# Patient Record
Sex: MALE | Race: WHITE | NOT HISPANIC OR LATINO
[De-identification: names, ages, dates, MRNs, and addresses within clinical notes are randomized per-mention and may not be internally consistent; named-entity substitution may affect disease eponyms.]

---

## 2017-06-07 ENCOUNTER — RX RENEWAL (OUTPATIENT)
Age: 72
End: 2017-06-07

## 2017-08-01 ENCOUNTER — APPOINTMENT (OUTPATIENT)
Dept: INTERNAL MEDICINE | Facility: CLINIC | Age: 72
End: 2017-08-01
Payer: MEDICARE

## 2017-08-01 DIAGNOSIS — Z23 ENCOUNTER FOR IMMUNIZATION: ICD-10-CM

## 2017-08-01 PROCEDURE — 90471 IMMUNIZATION ADMIN: CPT

## 2017-08-01 PROCEDURE — 90715 TDAP VACCINE 7 YRS/> IM: CPT

## 2017-09-11 ENCOUNTER — APPOINTMENT (OUTPATIENT)
Dept: INTERNAL MEDICINE | Facility: CLINIC | Age: 72
End: 2017-09-11
Payer: MEDICARE

## 2017-09-11 PROCEDURE — G0008: CPT

## 2017-09-11 PROCEDURE — 90662 IIV NO PRSV INCREASED AG IM: CPT

## 2017-12-04 ENCOUNTER — RX RENEWAL (OUTPATIENT)
Age: 72
End: 2017-12-04

## 2017-12-13 ENCOUNTER — LABORATORY RESULT (OUTPATIENT)
Age: 72
End: 2017-12-13

## 2017-12-13 ENCOUNTER — APPOINTMENT (OUTPATIENT)
Dept: INTERNAL MEDICINE | Facility: CLINIC | Age: 72
End: 2017-12-13
Payer: MEDICARE

## 2017-12-13 ENCOUNTER — NON-APPOINTMENT (OUTPATIENT)
Age: 72
End: 2017-12-13

## 2017-12-13 VITALS
OXYGEN SATURATION: 90 % | BODY MASS INDEX: 21.9 KG/M2 | TEMPERATURE: 98.1 F | SYSTOLIC BLOOD PRESSURE: 130 MMHG | HEIGHT: 62 IN | HEART RATE: 79 BPM | WEIGHT: 119 LBS | DIASTOLIC BLOOD PRESSURE: 80 MMHG

## 2017-12-13 DIAGNOSIS — Z71.89 OTHER SPECIFIED COUNSELING: ICD-10-CM

## 2017-12-13 PROCEDURE — G0439: CPT

## 2017-12-13 PROCEDURE — 93000 ELECTROCARDIOGRAM COMPLETE: CPT

## 2017-12-13 PROCEDURE — 36415 COLL VENOUS BLD VENIPUNCTURE: CPT

## 2018-01-18 ENCOUNTER — APPOINTMENT (OUTPATIENT)
Dept: INTERNAL MEDICINE | Facility: CLINIC | Age: 73
End: 2018-01-18
Payer: MEDICARE

## 2018-01-18 LAB
ALBUMIN SERPL ELPH-MCNC: 4.3 G/DL
ALP BLD-CCNC: 61 U/L
ALT SERPL-CCNC: 8 U/L
ANION GAP SERPL CALC-SCNC: 10 MMOL/L
APPEARANCE: CLEAR
AST SERPL-CCNC: 18 U/L
BASOPHILS # BLD AUTO: 0.02 K/UL
BASOPHILS NFR BLD AUTO: 0.3 %
BILIRUB SERPL-MCNC: 0.7 MG/DL
BILIRUBIN URINE: NEGATIVE
BLOOD URINE: NEGATIVE
BUN SERPL-MCNC: 17 MG/DL
CALCIUM SERPL-MCNC: 10 MG/DL
CHLORIDE SERPL-SCNC: 100 MMOL/L
CHOLEST SERPL-MCNC: 192 MG/DL
CHOLEST/HDLC SERPL: 2.3 RATIO
CO2 SERPL-SCNC: 34 MMOL/L
COLOR: ABNORMAL
CREAT SERPL-MCNC: 1.1 MG/DL
EOSINOPHIL # BLD AUTO: 0.09 K/UL
EOSINOPHIL NFR BLD AUTO: 1.4
GLUCOSE QUALITATIVE U: NEGATIVE MG/DL
GLUCOSE SERPL-MCNC: 82 MG/DL
HBA1C MFR BLD HPLC: 5.7 %
HCT VFR BLD CALC: 50.3 %
HDLC SERPL-MCNC: 83 MG/DL
HGB BLD-MCNC: 16 G/DL
IMM GRANULOCYTES NFR BLD AUTO: 0.2 %
KETONES URINE: ABNORMAL
LDLC SERPL CALC-MCNC: 89 MG/DL
LEUKOCYTE ESTERASE URINE: NEGATIVE
LYMPHOCYTES # BLD AUTO: 0.78 K/UL
LYMPHOCYTES NFR BLD AUTO: 12.3 %
MAN DIFF?: NORMAL
MCHC RBC-ENTMCNC: 31.8 GM/DL
MCHC RBC-ENTMCNC: 34.4 PG
MCV RBC AUTO: 108.2 FL
MONOCYTES # BLD AUTO: 0.59 K/UL
MONOCYTES NFR BLD AUTO: 9.3 %
NEUTROPHILS # BLD AUTO: 4.85 K/UL
NEUTROPHILS NFR BLD AUTO: 76.5 %
NITRITE URINE: NEGATIVE
PH URINE: 5.5
PLATELET # BLD AUTO: 264 K/UL
POTASSIUM SERPL-SCNC: 4.9 MMOL/L
PROT SERPL-MCNC: 7.4 G/DL
PROTEIN URINE: NEGATIVE MG/DL
PSA SERPL-MCNC: 8.11 NG/ML
RBC # BLD: 4.65 M/UL
RBC # FLD: 14.6 %
SODIUM SERPL-SCNC: 144 MMOL/L
SPECIFIC GRAVITY URINE: 1.03
T4 FREE SERPL-MCNC: 1.4 NG/DL
TRIGL SERPL-MCNC: 99 MG/DL
TSH SERPL-ACNC: 0.63 UIU/ML
UROBILINOGEN URINE: 1 MG/DL
WBC # FLD AUTO: 6.34 K/UL

## 2018-01-18 PROCEDURE — 36415 COLL VENOUS BLD VENIPUNCTURE: CPT

## 2018-01-19 LAB — PSA SERPL-MCNC: 7.43 NG/ML

## 2018-02-02 ENCOUNTER — APPOINTMENT (OUTPATIENT)
Dept: CT IMAGING | Facility: CLINIC | Age: 73
End: 2018-02-02

## 2018-02-02 ENCOUNTER — OTHER (OUTPATIENT)
Age: 73
End: 2018-02-02

## 2018-02-08 ENCOUNTER — OUTPATIENT (OUTPATIENT)
Dept: OUTPATIENT SERVICES | Facility: HOSPITAL | Age: 73
LOS: 1 days | End: 2018-02-08
Payer: COMMERCIAL

## 2018-02-08 ENCOUNTER — APPOINTMENT (OUTPATIENT)
Dept: CT IMAGING | Facility: CLINIC | Age: 73
End: 2018-02-08

## 2018-02-08 DIAGNOSIS — Z98.89 OTHER SPECIFIED POSTPROCEDURAL STATES: Chronic | ICD-10-CM

## 2018-02-08 DIAGNOSIS — Z00.8 ENCOUNTER FOR OTHER GENERAL EXAMINATION: ICD-10-CM

## 2018-02-08 DIAGNOSIS — Z90.89 ACQUIRED ABSENCE OF OTHER ORGANS: Chronic | ICD-10-CM

## 2018-02-08 PROCEDURE — 74177 CT ABD & PELVIS W/CONTRAST: CPT

## 2018-02-08 PROCEDURE — 82565 ASSAY OF CREATININE: CPT

## 2018-02-08 PROCEDURE — 74177 CT ABD & PELVIS W/CONTRAST: CPT | Mod: 26

## 2018-07-31 ENCOUNTER — MEDICATION RENEWAL (OUTPATIENT)
Age: 73
End: 2018-07-31

## 2018-07-31 ENCOUNTER — APPOINTMENT (OUTPATIENT)
Dept: UROLOGY | Facility: CLINIC | Age: 73
End: 2018-07-31
Payer: MEDICARE

## 2018-07-31 VITALS
WEIGHT: 125 LBS | SYSTOLIC BLOOD PRESSURE: 177 MMHG | HEART RATE: 66 BPM | RESPIRATION RATE: 15 BRPM | DIASTOLIC BLOOD PRESSURE: 83 MMHG | TEMPERATURE: 98 F | HEIGHT: 62 IN | BODY MASS INDEX: 23 KG/M2

## 2018-07-31 DIAGNOSIS — R39.9 UNSPECIFIED SYMPTOMS AND SIGNS INVOLVING THE GENITOURINARY SYSTEM: ICD-10-CM

## 2018-07-31 PROCEDURE — 99214 OFFICE O/P EST MOD 30 MIN: CPT

## 2018-07-31 RX ORDER — CIPROFLOXACIN HYDROCHLORIDE 500 MG/1
500 TABLET, FILM COATED ORAL TWICE DAILY
Qty: 14 | Refills: 0 | Status: COMPLETED | COMMUNITY
Start: 2017-12-13 | End: 2018-07-31

## 2018-08-05 ENCOUNTER — OTHER (OUTPATIENT)
Age: 73
End: 2018-08-05

## 2018-08-05 LAB
APPEARANCE: CLEAR
BACTERIA UR CULT: NORMAL
BACTERIA: NEGATIVE
BILIRUBIN URINE: NEGATIVE
BLOOD URINE: NEGATIVE
COLOR: YELLOW
CORE LAB FLUID CYTOLOGY: NORMAL
GLUCOSE QUALITATIVE U: NEGATIVE MG/DL
HYALINE CASTS: 1 /LPF
KETONES URINE: NEGATIVE
LEUKOCYTE ESTERASE URINE: NEGATIVE
MICROSCOPIC-UA: NORMAL
NITRITE URINE: NEGATIVE
PH URINE: 6.5
PROTEIN URINE: NEGATIVE MG/DL
PSA FREE FLD-MCNC: 22.5
PSA FREE SERPL-MCNC: 1.7 NG/ML
PSA SERPL-MCNC: 7.55 NG/ML
RED BLOOD CELLS URINE: 0 /HPF
SPECIFIC GRAVITY URINE: 1.01
SQUAMOUS EPITHELIAL CELLS: 0 /HPF
TESTOST SERPL-MCNC: 501.8 NG/DL
UROBILINOGEN URINE: NEGATIVE MG/DL
WHITE BLOOD CELLS URINE: 0 /HPF

## 2018-08-16 ENCOUNTER — APPOINTMENT (OUTPATIENT)
Dept: UROLOGY | Facility: CLINIC | Age: 73
End: 2018-08-16
Payer: MEDICARE

## 2018-08-16 PROCEDURE — 99214 OFFICE O/P EST MOD 30 MIN: CPT

## 2018-08-17 ENCOUNTER — OTHER (OUTPATIENT)
Age: 73
End: 2018-08-17

## 2018-08-27 ENCOUNTER — APPOINTMENT (OUTPATIENT)
Dept: MRI IMAGING | Facility: IMAGING CENTER | Age: 73
End: 2018-08-27

## 2018-09-02 LAB
PSA FREE FLD-MCNC: 16.1
PSA FREE SERPL-MCNC: 1.9 NG/ML
PSA SERPL-MCNC: 11.77 NG/ML

## 2018-09-05 ENCOUNTER — LABORATORY RESULT (OUTPATIENT)
Age: 73
End: 2018-09-05

## 2018-09-05 ENCOUNTER — APPOINTMENT (OUTPATIENT)
Dept: UROLOGY | Facility: CLINIC | Age: 73
End: 2018-09-05
Payer: MEDICARE

## 2018-09-05 PROCEDURE — 99214 OFFICE O/P EST MOD 30 MIN: CPT

## 2018-09-08 LAB
ALBUMIN SERPL ELPH-MCNC: 4.3 G/DL
ALP BLD-CCNC: 64 U/L
ALT SERPL-CCNC: 9 U/L
ANION GAP SERPL CALC-SCNC: 10 MMOL/L
APPEARANCE: CLEAR
AST SERPL-CCNC: 16 U/L
BACTERIA UR CULT: NORMAL
BACTERIA: NEGATIVE
BILIRUB SERPL-MCNC: 0.4 MG/DL
BILIRUBIN URINE: NEGATIVE
BLOOD URINE: NEGATIVE
BUN SERPL-MCNC: 21 MG/DL
CALCIUM SERPL-MCNC: 9.1 MG/DL
CHLORIDE SERPL-SCNC: 103 MMOL/L
CO2 SERPL-SCNC: 28 MMOL/L
COLOR: YELLOW
CORE LAB FLUID CYTOLOGY: NORMAL
CREAT SERPL-MCNC: 1.15 MG/DL
GLUCOSE QUALITATIVE U: NEGATIVE MG/DL
GLUCOSE SERPL-MCNC: 111 MG/DL
HYALINE CASTS: 1 /LPF
KETONES URINE: ABNORMAL
LEUKOCYTE ESTERASE URINE: NEGATIVE
MICROSCOPIC-UA: NORMAL
NITRITE URINE: NEGATIVE
PH URINE: 5.5
POTASSIUM SERPL-SCNC: 5.2 MMOL/L
PROT SERPL-MCNC: 6.4 G/DL
PROTEIN URINE: NEGATIVE MG/DL
PSA FREE FLD-MCNC: 24.1
PSA FREE SERPL-MCNC: 1.74 NG/ML
PSA SERPL-MCNC: 7.21 NG/ML
RED BLOOD CELLS URINE: 2 /HPF
SODIUM SERPL-SCNC: 141 MMOL/L
SPECIFIC GRAVITY URINE: 1.03
SQUAMOUS EPITHELIAL CELLS: 0 /HPF
TESTOST SERPL-MCNC: 292.4 NG/DL
UROBILINOGEN URINE: NEGATIVE MG/DL
WHITE BLOOD CELLS URINE: 1 /HPF

## 2018-09-11 LAB
BASOPHILS # BLD AUTO: 0.02 K/UL
BASOPHILS NFR BLD AUTO: 0.3 %
EOSINOPHIL # BLD AUTO: 0.07 K/UL
EOSINOPHIL NFR BLD AUTO: 1.2 %
HCT VFR BLD CALC: 43.2 %
HGB BLD-MCNC: 13.7 G/DL
IMM GRANULOCYTES NFR BLD AUTO: 0.2 %
LYMPHOCYTES # BLD AUTO: 0.96 K/UL
LYMPHOCYTES NFR BLD AUTO: 16.3 %
MAN DIFF?: NORMAL
MCHC RBC-ENTMCNC: 31.7 GM/DL
MCHC RBC-ENTMCNC: 33.9 PG
MCV RBC AUTO: 106.9 FL
MONOCYTES # BLD AUTO: 0.5 K/UL
MONOCYTES NFR BLD AUTO: 8.5 %
NEUTROPHILS # BLD AUTO: 4.33 K/UL
NEUTROPHILS NFR BLD AUTO: 73.5 %
PLATELET # BLD AUTO: 311 K/UL
RBC # BLD: 4.04 M/UL
RBC # FLD: 14.6 %
WBC # FLD AUTO: 5.89 K/UL

## 2018-09-14 ENCOUNTER — APPOINTMENT (OUTPATIENT)
Dept: DERMATOLOGY | Facility: CLINIC | Age: 73
End: 2018-09-14
Payer: MEDICARE

## 2018-09-14 VITALS
SYSTOLIC BLOOD PRESSURE: 130 MMHG | WEIGHT: 125 LBS | DIASTOLIC BLOOD PRESSURE: 68 MMHG | BODY MASS INDEX: 23 KG/M2 | HEIGHT: 62 IN

## 2018-09-14 DIAGNOSIS — Z86.79 PERSONAL HISTORY OF OTHER DISEASES OF THE CIRCULATORY SYSTEM: ICD-10-CM

## 2018-09-14 DIAGNOSIS — Z91.89 OTHER SPECIFIED PERSONAL RISK FACTORS, NOT ELSEWHERE CLASSIFIED: ICD-10-CM

## 2018-09-14 PROCEDURE — 17000 DESTRUCT PREMALG LESION: CPT

## 2018-09-14 PROCEDURE — 99203 OFFICE O/P NEW LOW 30 MIN: CPT

## 2018-10-24 ENCOUNTER — APPOINTMENT (OUTPATIENT)
Dept: UROLOGY | Facility: CLINIC | Age: 73
End: 2018-10-24

## 2018-11-12 ENCOUNTER — RX RENEWAL (OUTPATIENT)
Age: 73
End: 2018-11-12

## 2018-11-14 LAB
ALBUMIN SERPL ELPH-MCNC: 4.4 G/DL
ALP BLD-CCNC: 69 U/L
ALT SERPL-CCNC: 9 U/L
ANION GAP SERPL CALC-SCNC: 12 MMOL/L
APPEARANCE: CLEAR
AST SERPL-CCNC: 13 U/L
BACTERIA: NEGATIVE
BASOPHILS # BLD AUTO: 0.02 K/UL
BASOPHILS NFR BLD AUTO: 0.3 %
BILIRUB SERPL-MCNC: 0.4 MG/DL
BILIRUBIN URINE: NEGATIVE
BLOOD URINE: NEGATIVE
BUN SERPL-MCNC: 16 MG/DL
CALCIUM SERPL-MCNC: 9.2 MG/DL
CHLORIDE SERPL-SCNC: 97 MMOL/L
CO2 SERPL-SCNC: 29 MMOL/L
COLOR: YELLOW
CORE LAB FLUID CYTOLOGY: NORMAL
CREAT SERPL-MCNC: 0.73 MG/DL
EOSINOPHIL # BLD AUTO: 0.03 K/UL
EOSINOPHIL NFR BLD AUTO: 0.5 %
GLUCOSE QUALITATIVE U: NEGATIVE MG/DL
GLUCOSE SERPL-MCNC: 109 MG/DL
HCT VFR BLD CALC: 46.3 %
HGB BLD-MCNC: 14.5 G/DL
HYALINE CASTS: 1 /LPF
IMM GRANULOCYTES NFR BLD AUTO: 0.2 %
KETONES URINE: ABNORMAL
LEUKOCYTE ESTERASE URINE: NEGATIVE
LYMPHOCYTES # BLD AUTO: 0.75 K/UL
LYMPHOCYTES NFR BLD AUTO: 13 %
MAN DIFF?: NORMAL
MCHC RBC-ENTMCNC: 31.3 GM/DL
MCHC RBC-ENTMCNC: 31.8 PG
MCV RBC AUTO: 101.5 FL
MICROSCOPIC-UA: NORMAL
MONOCYTES # BLD AUTO: 0.63 K/UL
MONOCYTES NFR BLD AUTO: 10.9 %
NEUTROPHILS # BLD AUTO: 4.35 K/UL
NEUTROPHILS NFR BLD AUTO: 75.1 %
NITRITE URINE: NEGATIVE
PH URINE: 6
PLATELET # BLD AUTO: 312 K/UL
POTASSIUM SERPL-SCNC: 4.9 MMOL/L
PROT SERPL-MCNC: 6.8 G/DL
PROTEIN URINE: NEGATIVE MG/DL
PSA FREE FLD-MCNC: 22.3
PSA FREE SERPL-MCNC: 2.12 NG/ML
PSA SERPL-MCNC: 9.5 NG/ML
RBC # BLD: 4.56 M/UL
RBC # FLD: 14.7 %
RED BLOOD CELLS URINE: 2 /HPF
SODIUM SERPL-SCNC: 138 MMOL/L
SPECIFIC GRAVITY URINE: 1.02
SQUAMOUS EPITHELIAL CELLS: 0 /HPF
UROBILINOGEN URINE: 1 MG/DL
WBC # FLD AUTO: 5.79 K/UL
WHITE BLOOD CELLS URINE: 2 /HPF

## 2018-11-18 LAB
TESTOST BND SERPL-MCNC: 2.9 PG/ML
TESTOST SERPL-MCNC: 473.9 NG/DL

## 2018-11-21 ENCOUNTER — APPOINTMENT (OUTPATIENT)
Dept: UROLOGY | Facility: CLINIC | Age: 73
End: 2018-11-21
Payer: MEDICARE

## 2018-11-21 DIAGNOSIS — D53.1 OTHER MEGALOBLASTIC ANEMIAS, NOT ELSEWHERE CLASSIFIED: ICD-10-CM

## 2018-11-21 PROCEDURE — 99214 OFFICE O/P EST MOD 30 MIN: CPT

## 2018-11-25 LAB
APPEARANCE: CLEAR
BACTERIA UR CULT: NORMAL
BACTERIA: NEGATIVE
BILIRUBIN URINE: NEGATIVE
BLOOD URINE: NEGATIVE
COLOR: YELLOW
CORE LAB FLUID CYTOLOGY: NORMAL
GLUCOSE QUALITATIVE U: NEGATIVE MG/DL
KETONES URINE: ABNORMAL
LEUKOCYTE ESTERASE URINE: NEGATIVE
MICROSCOPIC-UA: NORMAL
NITRITE URINE: NEGATIVE
PH URINE: 5.5
PROTEIN URINE: NEGATIVE MG/DL
RED BLOOD CELLS URINE: 1 /HPF
SPECIFIC GRAVITY URINE: 1.02
SQUAMOUS EPITHELIAL CELLS: 0 /HPF
UROBILINOGEN URINE: NEGATIVE MG/DL
WHITE BLOOD CELLS URINE: 0 /HPF

## 2018-12-10 ENCOUNTER — APPOINTMENT (OUTPATIENT)
Dept: INTERNAL MEDICINE | Facility: CLINIC | Age: 73
End: 2018-12-10
Payer: MEDICARE

## 2018-12-10 ENCOUNTER — OTHER (OUTPATIENT)
Age: 73
End: 2018-12-10

## 2018-12-10 ENCOUNTER — NON-APPOINTMENT (OUTPATIENT)
Age: 73
End: 2018-12-10

## 2018-12-10 VITALS
OXYGEN SATURATION: 93 % | SYSTOLIC BLOOD PRESSURE: 130 MMHG | BODY MASS INDEX: 22.43 KG/M2 | HEART RATE: 73 BPM | HEIGHT: 62.5 IN | TEMPERATURE: 97.7 F | DIASTOLIC BLOOD PRESSURE: 80 MMHG | WEIGHT: 125 LBS

## 2018-12-10 DIAGNOSIS — Z11.59 ENCOUNTER FOR SCREENING FOR OTHER VIRAL DISEASES: ICD-10-CM

## 2018-12-10 PROCEDURE — G0444 DEPRESSION SCREEN ANNUAL: CPT

## 2018-12-10 PROCEDURE — 99213 OFFICE O/P EST LOW 20 MIN: CPT | Mod: 25

## 2018-12-10 PROCEDURE — 99406 BEHAV CHNG SMOKING 3-10 MIN: CPT

## 2018-12-10 PROCEDURE — 36415 COLL VENOUS BLD VENIPUNCTURE: CPT

## 2018-12-10 PROCEDURE — 93000 ELECTROCARDIOGRAM COMPLETE: CPT | Mod: 59

## 2018-12-10 PROCEDURE — G0439: CPT

## 2018-12-10 RX ORDER — SULFAMETHOXAZOLE AND TRIMETHOPRIM 800; 160 MG/1; MG/1
800-160 TABLET ORAL
Qty: 28 | Refills: 0 | Status: DISCONTINUED | COMMUNITY
Start: 2018-07-31 | End: 2018-12-10

## 2018-12-10 NOTE — PHYSICAL EXAM
[No Acute Distress] : no acute distress [Well Nourished] : well nourished [Well Developed] : well developed [Well-Appearing] : well-appearing [Normal Voice/Communication] : normal voice/communication [Normal Sclera/Conjunctiva] : normal sclera/conjunctiva [PERRL] : pupils equal round and reactive to light [Normal Outer Ear/Nose] : the outer ears and nose were normal in appearance [Normal Oropharynx] : the oropharynx was normal [Normal TMs] : both tympanic membranes were normal [No JVD] : no jugular venous distention [Supple] : supple [No Lymphadenopathy] : no lymphadenopathy [Thyroid Normal, No Nodules] : the thyroid was normal and there were no nodules present [No Respiratory Distress] : no respiratory distress  [Clear to Auscultation] : lungs were clear to auscultation bilaterally [No Accessory Muscle Use] : no accessory muscle use [Normal Rate] : normal rate  [Regular Rhythm] : with a regular rhythm [Normal S1, S2] : normal S1 and S2 [No Murmur] : no murmur heard [No Carotid Bruits] : no carotid bruits [No Abdominal Bruit] : a ~M bruit was not heard ~T in the abdomen [No Varicosities] : no varicosities [Pedal Pulses Present] : the pedal pulses are present [No Edema] : there was no peripheral edema [No Extremity Clubbing/Cyanosis] : no extremity clubbing/cyanosis [Soft] : abdomen soft [Non Tender] : non-tender [Non-distended] : non-distended [No Masses] : no abdominal mass palpated [No HSM] : no HSM [Normal Bowel Sounds] : normal bowel sounds [Declined Rectal Exam] : declined rectal exam [Normal Supraclavicular Nodes] : no supraclavicular lymphadenopathy [Normal Posterior Cervical Nodes] : no posterior cervical lymphadenopathy [Normal Anterior Cervical Nodes] : no anterior cervical lymphadenopathy [No CVA Tenderness] : no CVA  tenderness [No Spinal Tenderness] : no spinal tenderness [No Joint Swelling] : no joint swelling [Grossly Normal Strength/Tone] : grossly normal strength/tone [No Rash] : no rash [Normal Gait] : normal gait [Coordination Grossly Intact] : coordination grossly intact [No Focal Deficits] : no focal deficits [Speech Grossly Normal] : speech grossly normal [Normal Affect] : the affect was normal [Normal Mood] : the mood was normal [Normal Insight/Judgement] : insight and judgment were intact [de-identified] : thin stature  [FreeTextEntry1] : done w urology recently

## 2018-12-10 NOTE — HISTORY OF PRESENT ILLNESS
[FreeTextEntry1] : \par 74 y/o man presents for annual physical exam. he feels well currently overall w no new concerns. \par \par Crohns disease stable on mesalamine, following w Dr Timmy Vidales GI, his weight has stabilized after dropping somewhat last year. he has no problems with bowel movements or abd pain. \par \par chronic tobacco use unable to cut down significantly or quit \par BPH on rx w elevated PSA, monitoring w urology regularly, MRI prostate did not reveal any lesions \par GERD well controlled on PPI \par ED symptoms unchanged\par pulmonary nodule LLL 6 mm unchanged on most recent CT chest 2016\par \par last colonoscopy 5/15 w Dr Vidales

## 2018-12-10 NOTE — COUNSELING
[Activity counseling provided] : activity [Smoking cessation counseling provided] : smoking cessation [Quit Smoking] : Quit smoking [ - Annual Depression Screening] : Annual Depression Screening

## 2018-12-10 NOTE — ASSESSMENT
[FreeTextEntry1] : discussed w pt \par \par check routine labs as below \par \par cont current rx \par \par smoking cessation advised and discussed in detail. he has been prescribed Chantix in the past and is aware of options, he is not ready to try to quit smoking currently. however he is moving to a new residential community soon and realizes he may not be able to smoke on premises. \par \par vaccines UTD , had influenza vaccine at the pharm \par \par GI f/u w Dr Vidales as scheduled, last colonoscopy 2015\par \par repeat CT chest imaging of LLL nodule , ordered \par \par cont urology f/u, PSA monitoring as scheduled, MRI prostate normal \par \par RTO 6 months for routine f/u or earlier prn if any new concerns

## 2018-12-10 NOTE — HEALTH RISK ASSESSMENT
[No falls in past year] : Patient reported no falls in the past year [0] : 2) Feeling down, depressed, or hopeless: Not at all (0) [Patient reported colonoscopy was normal] : Patient reported colonoscopy was normal [Hepatitis C test offered] : Hepatitis C test offered [None] : None [With Significant Other] : lives with significant other [Retired] : retired [] :  [Fully functional (bathing, dressing, toileting, transferring, walking, feeding)] : Fully functional (bathing, dressing, toileting, transferring, walking, feeding) [Fully functional (using the telephone, shopping, preparing meals, housekeeping, doing laundry, using] : Fully functional and needs no help or supervision to perform IADLs (using the telephone, shopping, preparing meals, housekeeping, doing laundry, using transportation, managing medications and managing finances) [] : No [ColonoscopyDate] : 05/15

## 2018-12-12 LAB
ALBUMIN SERPL ELPH-MCNC: 4.2 G/DL
ALP BLD-CCNC: 68 U/L
ALT SERPL-CCNC: 8 U/L
ANION GAP SERPL CALC-SCNC: 13 MMOL/L
AST SERPL-CCNC: 20 U/L
BASOPHILS # BLD AUTO: 0.02 K/UL
BASOPHILS NFR BLD AUTO: 0.4 %
BILIRUB SERPL-MCNC: 0.4 MG/DL
BUN SERPL-MCNC: 18 MG/DL
CALCIUM SERPL-MCNC: 9.5 MG/DL
CHLORIDE SERPL-SCNC: 99 MMOL/L
CHOLEST SERPL-MCNC: 201 MG/DL
CHOLEST/HDLC SERPL: 2.4 RATIO
CO2 SERPL-SCNC: 28 MMOL/L
CREAT SERPL-MCNC: 0.83 MG/DL
EOSINOPHIL # BLD AUTO: 0.06 K/UL
EOSINOPHIL NFR BLD AUTO: 1.2 %
FOLATE SERPL-MCNC: 10 NG/ML
GLUCOSE SERPL-MCNC: 59 MG/DL
HBA1C MFR BLD HPLC: 5.9 %
HCT VFR BLD CALC: 49.4 %
HCV AB SER QL: NONREACTIVE
HCV S/CO RATIO: 0.2 S/CO
HDLC SERPL-MCNC: 83 MG/DL
HGB BLD-MCNC: 15.1 G/DL
IMM GRANULOCYTES NFR BLD AUTO: 0.2 %
LDLC SERPL CALC-MCNC: 106 MG/DL
LYMPHOCYTES # BLD AUTO: 0.79 K/UL
LYMPHOCYTES NFR BLD AUTO: 16.1 %
MAN DIFF?: NORMAL
MCHC RBC-ENTMCNC: 30.6 GM/DL
MCHC RBC-ENTMCNC: 31.3 PG
MCV RBC AUTO: 102.5 FL
MONOCYTES # BLD AUTO: 0.46 K/UL
MONOCYTES NFR BLD AUTO: 9.4 %
NEUTROPHILS # BLD AUTO: 3.56 K/UL
NEUTROPHILS NFR BLD AUTO: 72.7 %
PLATELET # BLD AUTO: 271 K/UL
POTASSIUM SERPL-SCNC: 4.5 MMOL/L
PROT SERPL-MCNC: 7.6 G/DL
RBC # BLD: 4.82 M/UL
RBC # FLD: 15.7 %
SODIUM SERPL-SCNC: 140 MMOL/L
T4 FREE SERPL-MCNC: 1.2 NG/DL
TRIGL SERPL-MCNC: 58 MG/DL
TSH SERPL-ACNC: 1.57 UIU/ML
VIT B12 SERPL-MCNC: 398 PG/ML
WBC # FLD AUTO: 4.9 K/UL

## 2018-12-16 ENCOUNTER — FORM ENCOUNTER (OUTPATIENT)
Age: 73
End: 2018-12-16

## 2018-12-17 ENCOUNTER — OUTPATIENT (OUTPATIENT)
Dept: OUTPATIENT SERVICES | Facility: HOSPITAL | Age: 73
LOS: 1 days | End: 2018-12-17
Payer: COMMERCIAL

## 2018-12-17 ENCOUNTER — APPOINTMENT (OUTPATIENT)
Dept: CT IMAGING | Facility: CLINIC | Age: 73
End: 2018-12-17

## 2018-12-17 DIAGNOSIS — Z98.89 OTHER SPECIFIED POSTPROCEDURAL STATES: Chronic | ICD-10-CM

## 2018-12-17 DIAGNOSIS — Z00.8 ENCOUNTER FOR OTHER GENERAL EXAMINATION: ICD-10-CM

## 2018-12-17 DIAGNOSIS — Z90.89 ACQUIRED ABSENCE OF OTHER ORGANS: Chronic | ICD-10-CM

## 2018-12-17 PROCEDURE — 71250 CT THORAX DX C-: CPT

## 2018-12-17 PROCEDURE — 71250 CT THORAX DX C-: CPT | Mod: 26

## 2018-12-18 DIAGNOSIS — R91.8 OTHER NONSPECIFIC ABNORMAL FINDING OF LUNG FIELD: ICD-10-CM

## 2019-01-16 ENCOUNTER — APPOINTMENT (OUTPATIENT)
Dept: CARDIOLOGY | Facility: CLINIC | Age: 74
End: 2019-01-16
Payer: MEDICARE

## 2019-01-16 ENCOUNTER — NON-APPOINTMENT (OUTPATIENT)
Age: 74
End: 2019-01-16

## 2019-01-16 VITALS
HEART RATE: 81 BPM | BODY MASS INDEX: 21.17 KG/M2 | DIASTOLIC BLOOD PRESSURE: 83 MMHG | OXYGEN SATURATION: 93 % | WEIGHT: 118 LBS | HEIGHT: 62.5 IN | SYSTOLIC BLOOD PRESSURE: 174 MMHG

## 2019-01-16 VITALS — SYSTOLIC BLOOD PRESSURE: 130 MMHG | DIASTOLIC BLOOD PRESSURE: 70 MMHG

## 2019-01-16 DIAGNOSIS — Z01.810 ENCOUNTER FOR PREPROCEDURAL CARDIOVASCULAR EXAMINATION: ICD-10-CM

## 2019-01-16 PROCEDURE — 93000 ELECTROCARDIOGRAM COMPLETE: CPT

## 2019-01-16 PROCEDURE — 99204 OFFICE O/P NEW MOD 45 MIN: CPT

## 2019-01-17 PROBLEM — Z01.810 PREOPERATIVE CARDIOVASCULAR EXAMINATION: Status: ACTIVE | Noted: 2019-01-17

## 2019-02-05 ENCOUNTER — MEDICATION RENEWAL (OUTPATIENT)
Age: 74
End: 2019-02-05

## 2019-02-19 ENCOUNTER — APPOINTMENT (OUTPATIENT)
Dept: INTERNAL MEDICINE | Facility: CLINIC | Age: 74
End: 2019-02-19

## 2019-02-28 ENCOUNTER — APPOINTMENT (OUTPATIENT)
Dept: INTERNAL MEDICINE | Facility: CLINIC | Age: 74
End: 2019-02-28
Payer: MEDICARE

## 2019-02-28 VITALS
HEART RATE: 60 BPM | BODY MASS INDEX: 21.62 KG/M2 | DIASTOLIC BLOOD PRESSURE: 60 MMHG | SYSTOLIC BLOOD PRESSURE: 140 MMHG | HEIGHT: 63 IN | WEIGHT: 122 LBS | TEMPERATURE: 98.3 F | OXYGEN SATURATION: 94 %

## 2019-02-28 PROCEDURE — 99214 OFFICE O/P EST MOD 30 MIN: CPT | Mod: 25

## 2019-02-28 PROCEDURE — 99406 BEHAV CHNG SMOKING 3-10 MIN: CPT

## 2019-02-28 NOTE — REVIEW OF SYSTEMS
[Negative] : Heme/Lymph [Fever] : no fever [Chills] : no chills [Recent Change In Weight] : ~T no recent weight change [Chest Pain] : no chest pain [Lower Ext Edema] : no lower extremity edema [Orthopena] : no orthopnea [Paroysmal Nocturnal Dyspnea] : no paroysmal nocturnal dyspnea [Shortness Of Breath] : no shortness of breath [Wheezing] : no wheezing [Cough] : no cough [Dyspnea on Exertion] : not dyspnea on exertion [Abdominal Pain] : no abdominal pain [Vomiting] : no vomiting [Dysuria] : no dysuria [Back Pain] : no back pain

## 2019-02-28 NOTE — PHYSICAL EXAM
[No Acute Distress] : no acute distress [Well-Appearing] : well-appearing [Normal Voice/Communication] : normal voice/communication [No JVD] : no jugular venous distention [Supple] : supple [No Respiratory Distress] : no respiratory distress  [Clear to Auscultation] : lungs were clear to auscultation bilaterally [No Accessory Muscle Use] : no accessory muscle use [Normal Rate] : normal rate  [Regular Rhythm] : with a regular rhythm [Normal S1, S2] : normal S1 and S2 [No Murmur] : no murmur heard [No Carotid Bruits] : no carotid bruits [No Edema] : there was no peripheral edema [Normal Supraclavicular Nodes] : no supraclavicular lymphadenopathy [Normal Posterior Cervical Nodes] : no posterior cervical lymphadenopathy [Normal Anterior Cervical Nodes] : no anterior cervical lymphadenopathy [Grossly Normal Strength/Tone] : grossly normal strength/tone [Normal Gait] : normal gait [Coordination Grossly Intact] : coordination grossly intact [Normal Mood] : the mood was normal [Normal Insight/Judgement] : insight and judgment were intact [de-identified] : mildly reduced BS  b/l  [de-identified] : L later chest and back w VATS incisional scars, well healed

## 2019-02-28 NOTE — ASSESSMENT
[FreeTextEntry1] : discussed w pt \par reviewed CT, PET imaging results and biopsy report. 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 \par he is doing very well s/p wedge resection of LLL adenocarcinoma. all lymph nodes neg.\par cont pulm rehab. \par he has stopping smoking since his surgery. he is maintaining his abstinence from smoking and discussed strategies for long term complete smoking cessation and importance of this. \par discussed COPD and again the importance of smoking cessation, some exercise as well. \par f/u w pulmonologist as scheduled. \par cont Anoro ellipta. \par \par RTO 2 months for f/u or earlier prn if any new concerns

## 2019-02-28 NOTE — COUNSELING
[Discussed Risks and Advised to Quit Smoking] : Discussed risks and advised to quit smoking [Discussed Cessation Strategies] : cessation strategies were discussed [Tobacco Use Cessation Intermediate Greater Than 3 Minutes Up to 10 Minutes] : Tobacco Use Cessation Intermediate Greater Than 3 Minutes Up to 10 Minutes

## 2019-02-28 NOTE — HISTORY OF PRESENT ILLNESS
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 he is feeling well currently, has no concerns. surgery went well , no complications. there was some concern re his PFTs which showed significant preop emphysema, but pt has remained relatively asymptomatic and tolerated surgery very well. \par he has started pulm rehab as recommended at [facility]. following w a pulmonologist and was started on Anoro ellipta. \par pathology showed adenocarcinoma, all lymph nodes neg. no further treatment was advised after lung wedge resection. he has stopped smoking since the diagnosis and is doing well. 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 \par \par HTN controlled on rx

## 2019-03-20 ENCOUNTER — APPOINTMENT (OUTPATIENT)
Dept: PULMONOLOGY | Facility: CLINIC | Age: 74
End: 2019-03-20

## 2019-04-11 LAB
PSA FREE FLD-MCNC: 23 %
PSA FREE SERPL-MCNC: 1.85 NG/ML
PSA SERPL-MCNC: 8.22 NG/ML

## 2019-04-15 ENCOUNTER — APPOINTMENT (OUTPATIENT)
Dept: PULMONOLOGY | Facility: CLINIC | Age: 74
End: 2019-04-15
Payer: MEDICARE

## 2019-04-15 ENCOUNTER — APPOINTMENT (OUTPATIENT)
Dept: UROLOGY | Facility: CLINIC | Age: 74
End: 2019-04-15
Payer: MEDICARE

## 2019-04-15 VITALS
HEIGHT: 64 IN | DIASTOLIC BLOOD PRESSURE: 85 MMHG | BODY MASS INDEX: 21.51 KG/M2 | OXYGEN SATURATION: 95 % | WEIGHT: 126 LBS | RESPIRATION RATE: 16 BRPM | SYSTOLIC BLOOD PRESSURE: 171 MMHG | HEART RATE: 60 BPM

## 2019-04-15 LAB
APPEARANCE: CLEAR
BACTERIA: NEGATIVE
BILIRUBIN URINE: NEGATIVE
BLOOD URINE: NEGATIVE
COLOR: NORMAL
GLUCOSE QUALITATIVE U: NEGATIVE
HYALINE CASTS: 1 /LPF
KETONES URINE: NEGATIVE
LEUKOCYTE ESTERASE URINE: NEGATIVE
MICROSCOPIC-UA: NORMAL
NITRITE URINE: NEGATIVE
PH URINE: 6.5
PROTEIN URINE: NEGATIVE
RED BLOOD CELLS URINE: 1 /HPF
SPECIFIC GRAVITY URINE: 1.02
SQUAMOUS EPITHELIAL CELLS: 0 /HPF
UROBILINOGEN URINE: NORMAL
WHITE BLOOD CELLS URINE: 0 /HPF

## 2019-04-15 PROCEDURE — 94060 EVALUATION OF WHEEZING: CPT

## 2019-04-15 PROCEDURE — 88738 HGB QUANT TRANSCUTANEOUS: CPT

## 2019-04-15 PROCEDURE — 94727 GAS DIL/WSHOT DETER LNG VOL: CPT

## 2019-04-15 PROCEDURE — 99215 OFFICE O/P EST HI 40 MIN: CPT

## 2019-04-15 PROCEDURE — 99204 OFFICE O/P NEW MOD 45 MIN: CPT | Mod: 25

## 2019-04-15 PROCEDURE — 94729 DIFFUSING CAPACITY: CPT

## 2019-04-15 PROCEDURE — 99406 BEHAV CHNG SMOKING 3-10 MIN: CPT

## 2019-04-15 RX ORDER — UMECLIDINIUM BROMIDE AND VILANTEROL TRIFENATATE 62.5; 25 UG/1; UG/1
62.5-25 POWDER RESPIRATORY (INHALATION)
Refills: 0 | Status: DISCONTINUED | COMMUNITY
End: 2019-04-15

## 2019-04-15 RX ORDER — SILDENAFIL 20 MG/1
20 TABLET ORAL
Qty: 30 | Refills: 11 | Status: DISCONTINUED | COMMUNITY
Start: 2018-11-21 | End: 2019-04-15

## 2019-04-15 NOTE — REVIEW OF SYSTEMS
[Eyesight Problems] : eyesight problems [Erectile Dysfunction] : erectile dysfunction [Nocturia] : nocturia [Feeling Poorly] : not feeling poorly [Feeling Tired] : not feeling tired [Loss Of Hearing] : no hearing loss [Nosebleeds] : no nosebleeds [Chest Pain] : no chest pain [Cough] : no cough [Constipation] : no constipation [Dysuria] : no dysuria [Incontinence] : no incontinence [Hesitancy] : no urinary hesitancy [Testicular Pain] : no testicular pain [Anxiety] : no anxiety [Depression] : no depression [Easy Bleeding] : no tendency for easy bleeding [Easy Bruising] : no tendency for easy bruising

## 2019-04-15 NOTE — PHYSICAL EXAM
[General Appearance - Well Developed] : well developed [General Appearance - Well Nourished] : well nourished [Normal Appearance] : normal appearance [Well Groomed] : well groomed [General Appearance - In No Acute Distress] : no acute distress [Abdomen Soft] : soft [Abdomen Tenderness] : non-tender [Costovertebral Angle Tenderness] : no ~M costovertebral angle tenderness [Prostate Tenderness] : the prostate was not tender [No Prostate Nodules] : no prostate nodules [Prostate Size ___ gm] : prostate size [unfilled] gm [Skin Color & Pigmentation] : normal skin color and pigmentation [Edema] : no peripheral edema [Heart Rate And Rhythm] : Heart rate and rhythm were normal [] : no respiratory distress [Exaggerated Use Of Accessory Muscles For Inspiration] : no accessory muscle use [Respiration, Rhythm And Depth] : normal respiratory rhythm and effort [Affect] : the affect was normal [Oriented To Time, Place, And Person] : oriented to person, place, and time [Mood] : the mood was normal [Not Anxious] : not anxious [Normal Station and Gait] : the gait and station were normal for the patient's age [No Focal Deficits] : no focal deficits [Cervical Lymph Nodes Enlarged Posterior Bilaterally] : posterior cervical [No Palpable Adenopathy] : no palpable adenopathy [Supraclavicular Lymph Nodes Enlarged Bilaterally] : supraclavicular [Cervical Lymph Nodes Enlarged Anterior Bilaterally] : anterior cervical [FreeTextEntry1] : No submandibular gland tenderness.\par

## 2019-04-15 NOTE — ADDENDUM
[FreeTextEntry1] : This note was authored by Bridget Tijerina working as a scribe for Dr. Cricket Sanchez.\par I, Dr. Cricket Sanchez, have reviewed the content of this note and confirm it is true and accurate. I personally performed the history and physical examination and made all the decisions.\par 4/15/19\par

## 2019-04-15 NOTE — HISTORY OF PRESENT ILLNESS
[FreeTextEntry1] : Mr. Ramos is a 73 year old male presented with elevated PSA. PSA from 1/18/18 was 7.43 ng/mL and previously 8.11 ng/mL on 12/13/17. Patient had two prostate needle biopsies in the past and pathology on 3/9/10 showed benign prostatic tissue and focal mild chronic prostatitis. Patient has seen Dr. Manuel two years ago for erectile dysfunction and decreased libido.  Had tried Trimix injections and oral medications for ED in the past but notes they do not improved his erections. Patient's wife has lung cancer and breast cancer. Has Crohn's disease but is not currently active. Current smoker. \par 8/16/18: The patient returned and reported he finished his Bactrim course. PSA from 8/13/18 was 11.77 ng/mL and a percent free PSA of 16.1%. \par 9/5/18: The patient returned to discuss MRI results. MRI of the prostate from 8/18/18 findings showed no suspicious lesions for clinically significant prostate cancer. If total PSA today is over 15 ng/mL and percent free PSA below 14%, we will proceed with biopsy. \par 11/21/18: The patient returned and reported his urination is adequate. Patient denied dysuria, gross hematuria, urgency, or incontinence. Wakes up 1-2 times during the night to urinate. Complained of ED, has tried viagra and Trimix injections in the past. Is willing to reconsider trying 7 tablets of Sildenafil. PSA from 11/12/18 was 9.50 ng/mL, current PSA is within range of patient, will watch PSA. \par \par 4/15/19: The patient returned today and reported that his urination is satisfactory. Patient denies dysuria, gross hematuria, pushing or straining to urinate, urgency, or incontinence. Wakes up once during the night to urinate. Patient hasn't tried the Sildenafil that I prescribed during his last visit. Notes that a nodule was recently found on his lung.It has been excised and was malignant,

## 2019-04-15 NOTE — PHYSICAL EXAM
[General Appearance - Well Developed] : well developed [General Appearance - Well Nourished] : well nourished [Well Groomed] : well groomed [Normal Appearance] : normal appearance [No Deformities] : no deformities [Normal Conjunctiva] : the conjunctiva exhibited no abnormalities [General Appearance - In No Acute Distress] : no acute distress [Eyelids - No Xanthelasma] : the eyelids demonstrated no xanthelasmas [Normal Oropharynx] : normal oropharynx [Jugular Venous Distention Increased] : there was no jugular-venous distention [Neck Appearance] : the appearance of the neck was normal [Neck Cervical Mass (___cm)] : no neck mass was observed [Thyroid Diffuse Enlargement] : the thyroid was not enlarged [Thyroid Nodule] : there were no palpable thyroid nodules [Heart Rate And Rhythm] : heart rate and rhythm were normal [Murmurs] : no murmurs present [Heart Sounds] : normal S1 and S2 [Prolonged Exp Time] : expiratory time was prolonged [Normal Rate] : the respiratory rate was normal [Wheezing Bilaterally] : no wheezing was heard [Tympany] : tympany to percussion [Hyperresonance] : hyperresonance to percussion [Decreased Breath Sounds Bilaterally] : breath sounds were diminished over both lungs [Abdomen Tenderness] : non-tender [Abdomen Soft] : soft [Abnormal Walk] : normal gait [Abdomen Mass (___ Cm)] : no abdominal mass palpated [Nail Clubbing] : no clubbing of the fingernails [Gait - Sufficient For Exercise Testing] : the gait was sufficient for exercise testing [Petechial Hemorrhages (___cm)] : no petechial hemorrhages [Cyanosis, Localized] : no localized cyanosis [Skin Color & Pigmentation] : normal skin color and pigmentation [] : no rash [Deep Tendon Reflexes (DTR)] : deep tendon reflexes were 2+ and symmetric [Skin Turgor] : normal skin turgor [No Focal Deficits] : no focal deficits [Sensation] : the sensory exam was normal to light touch and pinprick [Affect] : the affect was normal [Oriented To Time, Place, And Person] : oriented to person, place, and time [Impaired Insight] : insight and judgment were intact

## 2019-04-15 NOTE — ASSESSMENT
[FreeTextEntry1] : Mr. Ramos is a 73 year old male presented with elevated PSA. PSA from 1/18/18 was 7.43 ng/mL and previously 8.11 ng/mL on 12/13/17. Patient had two prostate needle biopsies in the past and pathology on 3/9/10 showed benign prostatic tissue and focal mild chronic prostatitis. Patient has seen Dr. Manuel two years ago for erectile dysfunction and decreased libido.  Had tried Trimix injections and oral medications for ED in the past but notes they do not improved his erections. Patient's wife has lung cancer and breast cancer. Has Crohn's disease but is not currently active. Current smoker. \par 8/16/18: The patient returned and reported he finished his Bactrim course. PSA from 8/13/18 was 11.77 ng/mL and a percent free PSA of 16.1%. \par 9/5/18: The patient returned to discuss MRI results. MRI of the prostate from 8/18/18 findings showed no suspicious lesions for clinically significant prostate cancer. If total PSA today is over 15 ng/mL and percent free PSA below 14%, we will proceed with biopsy. \par 11/21/18: The patient returned and reported his urination is adequate. Patient denied dysuria, gross hematuria, urgency, or incontinence. Wakes up 1-2 times during the night to urinate. Complained of ED, has tried viagra and Trimix injections in the past. Is willing to reconsider trying 7 tablets of Sildenafil. PSA from 11/12/18 was 9.50 ng/mL, current PSA is within range of patient, will watch PSA. \par \par 4/15/19: The patient returned today and reported that his urination is satisfactory. Patient denies dysuria, gross hematuria, pushing or straining to urinate, urgency, or incontinence. Wakes up once during the night to urinate. Patient hasn't tried the Sildenafil that I prescribed during his last visit. Notes that a nodule was recently found on his lung.\par  \par Digital rectal exam found no suspicious rectal masses. Anal tone is normal. The prostate is non tender, with normal texture, discrete borders, and no nodules. It is a 25 gram transurethral resection size. No gross blood on examining finger.\par \par The patient produced a urine sample which will be sent for urinalysis, urine cytology, and urine culture.\par \par The patient will return in 6 months for a follow up.

## 2019-04-16 NOTE — PROCEDURE
[FreeTextEntry1] : CT chest December 2018 new left upper lobe 3 cm mass. Mass occurred in area where previously there appeared to be a scar\par \par PET/CT January 2019 reveals intense uptake in left upper lobe mass.PFT demonstrates severe obstructive ventilatory defect significantly improved from preoperative spirometry\par \par

## 2019-04-16 NOTE — ASSESSMENT
[FreeTextEntry1] : Lung cancer status post what appears to be curative resection. Note that only a limited resection was performed because of the patient's severe preoperative pulmonary impairment. Evaluation by oncology at this point does not recommend additional treatment.\par \par Appears stable from pulmonary perspective. No clear role for inhalers in absence of ongoing bronchitis or dyspnea.\par \par Smoking cessation should be maintained

## 2019-04-16 NOTE — HISTORY OF PRESENT ILLNESS
[Cigarettes ___ /Day] : reports smoking [unfilled] packs of cigarettes per day [___ Year Quit] : ~He/She~ quit smoking in [unfilled] [Former] : is a former smoker [FreeTextEntry1] : Patient here for initial pulmonary evaluation. History of COPD was smoker until recently. Diagnosed with lung mass and underwent wedge resection left upper lobe. Actually reports improvement in respiratory symptoms since surgery and smoking cessation. Reports currently no shortness of breath wheezing or coughing. Only gets dyspneic with heavy activity. Was tried on inhalers which actually made him worse.

## 2019-04-21 LAB
BACTERIA UR CULT: NORMAL
TESTOST BND SERPL-MCNC: 2.8 PG/ML
TESTOST SERPL-MCNC: 278.1 NG/DL
URINE CYTOLOGY: NORMAL

## 2019-04-24 LAB
APPEARANCE: CLEAR
BACTERIA: NEGATIVE
BILIRUBIN URINE: NEGATIVE
BLOOD URINE: NEGATIVE
COLOR: YELLOW
GLUCOSE QUALITATIVE U: NEGATIVE
HYALINE CASTS: 3 /LPF
KETONES URINE: NEGATIVE
LEUKOCYTE ESTERASE URINE: NEGATIVE
MICROSCOPIC-UA: NORMAL
NITRITE URINE: NEGATIVE
PH URINE: 6
PROTEIN URINE: NORMAL
RED BLOOD CELLS URINE: 2 /HPF
SPECIFIC GRAVITY URINE: 1.03
SQUAMOUS EPITHELIAL CELLS: 0 /HPF
URINE CYTOLOGY: NORMAL
UROBILINOGEN URINE: NORMAL
WHITE BLOOD CELLS URINE: 1 /HPF

## 2019-05-14 ENCOUNTER — OTHER (OUTPATIENT)
Age: 74
End: 2019-05-14

## 2019-05-14 ENCOUNTER — APPOINTMENT (OUTPATIENT)
Dept: INTERNAL MEDICINE | Facility: CLINIC | Age: 74
End: 2019-05-14
Payer: MEDICARE

## 2019-05-14 PROCEDURE — 36415 COLL VENOUS BLD VENIPUNCTURE: CPT

## 2019-05-15 ENCOUNTER — RX RENEWAL (OUTPATIENT)
Age: 74
End: 2019-05-15

## 2019-05-16 LAB
MEV IGG FLD QL IA: >300 AU/ML
MEV IGG+IGM SER-IMP: POSITIVE

## 2019-08-08 ENCOUNTER — APPOINTMENT (OUTPATIENT)
Dept: INTERNAL MEDICINE | Facility: CLINIC | Age: 74
End: 2019-08-08
Payer: MEDICARE

## 2019-08-08 PROCEDURE — 36415 COLL VENOUS BLD VENIPUNCTURE: CPT

## 2019-08-09 LAB
MUV AB SER-ACNC: POSITIVE
MUV IGG SER QL IA: >300 AU/ML
RUBV IGG FLD-ACNC: 10.3 INDEX
RUBV IGG SER-IMP: POSITIVE
VZV AB TITR SER: POSITIVE
VZV IGG SER IF-ACNC: 730.5 INDEX

## 2019-09-25 ENCOUNTER — APPOINTMENT (OUTPATIENT)
Dept: UROLOGY | Facility: CLINIC | Age: 74
End: 2019-09-25
Payer: MEDICARE

## 2019-09-25 VITALS
HEART RATE: 53 BPM | SYSTOLIC BLOOD PRESSURE: 184 MMHG | BODY MASS INDEX: 21.51 KG/M2 | HEIGHT: 64 IN | WEIGHT: 126 LBS | RESPIRATION RATE: 16 BRPM | DIASTOLIC BLOOD PRESSURE: 85 MMHG

## 2019-09-25 LAB
ALBUMIN SERPL ELPH-MCNC: 4.3 G/DL
ALP BLD-CCNC: 77 U/L
ALT SERPL-CCNC: 10 U/L
ANION GAP SERPL CALC-SCNC: 12 MMOL/L
AST SERPL-CCNC: 16 U/L
BILIRUB SERPL-MCNC: 0.5 MG/DL
BUN SERPL-MCNC: 16 MG/DL
CALCIUM SERPL-MCNC: 9.3 MG/DL
CHLORIDE SERPL-SCNC: 101 MMOL/L
CO2 SERPL-SCNC: 26 MMOL/L
CREAT SERPL-MCNC: 0.99 MG/DL
GLUCOSE SERPL-MCNC: 94 MG/DL
POTASSIUM SERPL-SCNC: 5.1 MMOL/L
PROT SERPL-MCNC: 6.5 G/DL
SODIUM SERPL-SCNC: 139 MMOL/L
TESTOST BND SERPL-MCNC: 2.8 PG/ML
TESTOST SERPL-MCNC: 297.8 NG/DL

## 2019-09-25 PROCEDURE — 99214 OFFICE O/P EST MOD 30 MIN: CPT

## 2019-09-25 NOTE — HISTORY OF PRESENT ILLNESS
[FreeTextEntry1] : Mr. Ramos is a 73 year old male presented with elevated PSA. PSA from 1/18/18 was 7.43 ng/mL and previously 8.11 ng/mL on 12/13/17. Patient had two prostate needle biopsies in the past and pathology on 3/9/10 showed benign prostatic tissue and focal mild chronic prostatitis. Patient has seen Dr. Manuel two years ago for erectile dysfunction and decreased libido.  Had tried Trimix injections and oral medications for ED in the past but notes they do not improved his erections. Patient's wife has lung cancer and breast cancer. Has Crohn's disease but is not currently active. Current smoker. \par 8/16/18: The patient returned and reported he finished his Bactrim course. PSA from 8/13/18 was 11.77 ng/mL and a percent free PSA of 16.1%. \par 9/5/18: The patient returned to discuss MRI results. MRI of the prostate from 8/18/18 findings showed no suspicious lesions for clinically significant prostate cancer. If total PSA today is over 15 ng/mL and percent free PSA below 14%, we will proceed with biopsy. \par 11/21/18: The patient returned and reported his urination is adequate. Patient denied dysuria, gross hematuria, urgency, or incontinence. Wakes up 1-2 times during the night to urinate. Complained of ED, has tried viagra and Trimix injections in the past. Is willing to reconsider trying 7 tablets of Sildenafil. PSA from 11/12/18 was 9.50 ng/mL, current PSA is within range of patient, will watch PSA. \par 4/15/19: The patient returned today and reported that his urination is satisfactory. Patient denies dysuria, gross hematuria, pushing or straining to urinate, urgency, or incontinence. Wakes up once during the night to urinate. Patient hasn't tried the Sildenafil that I prescribed during his last visit. Notes that a nodule was recently found on his lung.It has been excised and was malignant,\par \par 9/25/19: Patient presents today for a follow up. Today he states that his urination is satisfactory. Patient denies dysuria, gross hematuria, urgency or incontinence. He is feeling very good overall. He has a Hx of elevated PSA and his most recent PSA from 9/17/19 was 8.77 ng/mL. An MRI was completed last year and determined PIRADS 2. A biopsy was not necessary at that time.

## 2019-09-25 NOTE — ASSESSMENT
[FreeTextEntry1] : Mr. Ramos is a 73 year old male presented with elevated PSA. PSA from 1/18/18 was 7.43 ng/mL and previously 8.11 ng/mL on 12/13/17. Patient had two prostate needle biopsies in the past and pathology on 3/9/10 showed benign prostatic tissue and focal mild chronic prostatitis. Patient has seen Dr. Manuel two years ago for erectile dysfunction and decreased libido.  Had tried Trimix injections and oral medications for ED in the past but notes they do not improved his erections. Patient's wife has lung cancer and breast cancer. Has Crohn's disease but is not currently active. Current smoker. \par 8/16/18: The patient returned and reported he finished his Bactrim course. PSA from 8/13/18 was 11.77 ng/mL and a percent free PSA of 16.1%. \par 9/5/18: The patient returned to discuss MRI results. MRI of the prostate from 8/18/18 findings showed no suspicious lesions for clinically significant prostate cancer. If total PSA today is over 15 ng/mL and percent free PSA below 14%, we will proceed with biopsy. \par 11/21/18: The patient returned and reported his urination is adequate. Patient denied dysuria, gross hematuria, urgency, or incontinence. Wakes up 1-2 times during the night to urinate. Complained of ED, has tried viagra and Trimix injections in the past. Is willing to reconsider trying 7 tablets of Sildenafil. PSA from 11/12/18 was 9.50 ng/mL, current PSA is within range of patient, will watch PSA. \par 4/15/19: The patient returned today and reported that his urination is satisfactory. Patient denies dysuria, gross hematuria, pushing or straining to urinate, urgency, or incontinence. Wakes up once during the night to urinate. Patient hasn't tried the Sildenafil that I prescribed during his last visit. Notes that a nodule was recently found on his lung.It has been excised and was malignant,\par \par 9/25/19: Patient presents today for a follow up. Today he states that his urination is satisfactory. Patient denies dysuria, gross hematuria, urgency or incontinence. He is feeling very good overall. He has a Hx of elevated PSA and his most recent PSA from 9/17/19 was 8.77 ng/mL. An MRI was completed last year and determined PIRADS 2. A biopsy was not necessary at that time. \par \par Digital rectal exam found no suspicious rectal masses. Anal tone is normal. The prostate is non tender with normal texture, discrete borders and no nodules. It is a 30 gram transurethral resection size. Wider left to right than the height superior to inferior. No gross blood on examining finger. \par \par The patient produced a urine sample which will be sent for urinalysis, urine cytology and urine culture. \par \par Upon review of his medical record it is observed that his PSA has increased slightly but has decreased overall from when the MRI was ordered and completed a year ago. Therefore I still do not believe a biopsy of the prostate is necessary. I will continue to monitor his PSA regularly. \par \par I advised him to take his blood pressure medications as directed. \par \par He is to follow up in 6 months or sooner if clinically indicated.

## 2019-09-25 NOTE — ADDENDUM
[FreeTextEntry1] : This note was authored by Jas Jimenez working as scribe for Dr. Cricket Sanchez. I, Dr. Cricket Sanchez, have reviewed the content of this note and confirm it is true and accurate. I personally performed the history and physical examination and made all the decisions.\par 9/25/19

## 2019-09-25 NOTE — PHYSICAL EXAM
[General Appearance - Well Developed] : well developed [General Appearance - Well Nourished] : well nourished [Normal Appearance] : normal appearance [General Appearance - In No Acute Distress] : no acute distress [Well Groomed] : well groomed [Abdomen Soft] : soft [Costovertebral Angle Tenderness] : no ~M costovertebral angle tenderness [Abdomen Tenderness] : non-tender [Prostate Tenderness] : the prostate was not tender [No Prostate Nodules] : no prostate nodules [Prostate Size ___ gm] : prostate size [unfilled] gm [Skin Color & Pigmentation] : normal skin color and pigmentation [Heart Rate And Rhythm] : Heart rate and rhythm were normal [Edema] : no peripheral edema [] : no respiratory distress [Respiration, Rhythm And Depth] : normal respiratory rhythm and effort [Exaggerated Use Of Accessory Muscles For Inspiration] : no accessory muscle use [Oriented To Time, Place, And Person] : oriented to person, place, and time [Mood] : the mood was normal [Affect] : the affect was normal [Not Anxious] : not anxious [Normal Station and Gait] : the gait and station were normal for the patient's age [No Focal Deficits] : no focal deficits [No Palpable Adenopathy] : no palpable adenopathy [Cervical Lymph Nodes Enlarged Anterior Bilaterally] : anterior cervical [Cervical Lymph Nodes Enlarged Posterior Bilaterally] : posterior cervical [Supraclavicular Lymph Nodes Enlarged Bilaterally] : supraclavicular [FreeTextEntry1] : No submandibular gland tenderness.\par

## 2019-09-25 NOTE — REVIEW OF SYSTEMS
[Eyesight Problems] : eyesight problems [Nocturia] : nocturia [Erectile Dysfunction] : erectile dysfunction [Feeling Poorly] : not feeling poorly [Feeling Tired] : not feeling tired [Nosebleeds] : no nosebleeds [Loss Of Hearing] : no hearing loss [Cough] : no cough [Chest Pain] : no chest pain [Constipation] : no constipation [Dysuria] : no dysuria [Incontinence] : no incontinence [Hesitancy] : no urinary hesitancy [Testicular Pain] : no testicular pain [Anxiety] : no anxiety [Depression] : no depression [Easy Bleeding] : no tendency for easy bleeding [Easy Bruising] : no tendency for easy bruising

## 2019-09-27 ENCOUNTER — APPOINTMENT (OUTPATIENT)
Dept: PULMONOLOGY | Facility: CLINIC | Age: 74
End: 2019-09-27
Payer: MEDICARE

## 2019-09-27 VITALS — HEART RATE: 62 BPM | SYSTOLIC BLOOD PRESSURE: 167 MMHG | OXYGEN SATURATION: 97 % | DIASTOLIC BLOOD PRESSURE: 92 MMHG

## 2019-09-27 DIAGNOSIS — Z23 ENCOUNTER FOR IMMUNIZATION: ICD-10-CM

## 2019-09-27 PROCEDURE — 94010 BREATHING CAPACITY TEST: CPT

## 2019-09-27 PROCEDURE — 90662 IIV NO PRSV INCREASED AG IM: CPT

## 2019-09-27 PROCEDURE — G0008: CPT

## 2019-09-27 PROCEDURE — 99214 OFFICE O/P EST MOD 30 MIN: CPT | Mod: 25

## 2019-09-28 NOTE — PROCEDURE
[FreeTextEntry1] : Spirometry demonstrates severe mixed obstructive and restrictive impairment. Interval decline compared to prior

## 2019-09-28 NOTE — ASSESSMENT
[FreeTextEntry1] : Add inhaler\par \par Proper use and technique for inhaler demonstrated and explained.\par

## 2019-09-28 NOTE — HISTORY OF PRESENT ILLNESS
[FreeTextEntry1] :  History of COPD was smoker until recently. Diagnosed with lung mass and underwent wedge resection left upper lobe. Actually reports improvement in respiratory symptoms since surgery and smoking cessation. Reports currently no shortness of breath wheezing or coughing. Only gets dyspneic with heavy activity. Was tried on inhalers which actually made him worse.

## 2019-09-28 NOTE — PHYSICAL EXAM
[General Appearance - Well Developed] : well developed [Normal Appearance] : normal appearance [Well Groomed] : well groomed [General Appearance - Well Nourished] : well nourished [No Deformities] : no deformities [General Appearance - In No Acute Distress] : no acute distress [Normal Conjunctiva] : the conjunctiva exhibited no abnormalities [Eyelids - No Xanthelasma] : the eyelids demonstrated no xanthelasmas [Normal Oropharynx] : normal oropharynx [Neck Appearance] : the appearance of the neck was normal [Neck Cervical Mass (___cm)] : no neck mass was observed [Jugular Venous Distention Increased] : there was no jugular-venous distention [Thyroid Diffuse Enlargement] : the thyroid was not enlarged [Thyroid Nodule] : there were no palpable thyroid nodules [Heart Rate And Rhythm] : heart rate and rhythm were normal [Heart Sounds] : normal S1 and S2 [Murmurs] : no murmurs present [Abdomen Soft] : soft [Abdomen Tenderness] : non-tender [Abdomen Mass (___ Cm)] : no abdominal mass palpated [Abnormal Walk] : normal gait [Gait - Sufficient For Exercise Testing] : the gait was sufficient for exercise testing [Nail Clubbing] : no clubbing of the fingernails [Cyanosis, Localized] : no localized cyanosis [Petechial Hemorrhages (___cm)] : no petechial hemorrhages [] : no ischemic changes [Deep Tendon Reflexes (DTR)] : deep tendon reflexes were 2+ and symmetric [Sensation] : the sensory exam was normal to light touch and pinprick [No Focal Deficits] : no focal deficits [Oriented To Time, Place, And Person] : oriented to person, place, and time [Impaired Insight] : insight and judgment were intact [Affect] : the affect was normal

## 2019-10-04 ENCOUNTER — MEDICATION RENEWAL (OUTPATIENT)
Age: 74
End: 2019-10-04

## 2019-10-27 ENCOUNTER — FORM ENCOUNTER (OUTPATIENT)
Age: 74
End: 2019-10-27

## 2019-10-28 ENCOUNTER — APPOINTMENT (OUTPATIENT)
Dept: PULMONOLOGY | Facility: CLINIC | Age: 74
End: 2019-10-28
Payer: MEDICARE

## 2019-10-28 VITALS
DIASTOLIC BLOOD PRESSURE: 87 MMHG | RESPIRATION RATE: 15 BRPM | BODY MASS INDEX: 21.51 KG/M2 | HEIGHT: 64 IN | SYSTOLIC BLOOD PRESSURE: 139 MMHG | WEIGHT: 126 LBS | HEART RATE: 70 BPM | OXYGEN SATURATION: 96 % | TEMPERATURE: 98.3 F

## 2019-10-28 DIAGNOSIS — J44.1 CHRONIC OBSTRUCTIVE PULMONARY DISEASE WITH (ACUTE) EXACERBATION: ICD-10-CM

## 2019-10-28 PROCEDURE — 71046 X-RAY EXAM CHEST 2 VIEWS: CPT

## 2019-10-28 PROCEDURE — 94060 EVALUATION OF WHEEZING: CPT

## 2019-10-28 PROCEDURE — G0009: CPT

## 2019-10-28 PROCEDURE — 90472 IMMUNIZATION ADMIN EACH ADD: CPT

## 2019-10-28 PROCEDURE — 99214 OFFICE O/P EST MOD 30 MIN: CPT | Mod: 25

## 2019-10-28 PROCEDURE — 90732 PPSV23 VACC 2 YRS+ SUBQ/IM: CPT

## 2019-10-28 PROCEDURE — 90653 IIV ADJUVANT VACCINE IM: CPT

## 2019-10-29 PROBLEM — J44.1 COPD EXACERBATION: Status: ACTIVE | Noted: 2019-10-29

## 2019-10-29 NOTE — PHYSICAL EXAM
[General Appearance - Well Developed] : well developed [Normal Appearance] : normal appearance [Well Groomed] : well groomed [General Appearance - Well Nourished] : well nourished [No Deformities] : no deformities [General Appearance - In No Acute Distress] : no acute distress [Normal Conjunctiva] : the conjunctiva exhibited no abnormalities [Eyelids - No Xanthelasma] : the eyelids demonstrated no xanthelasmas [Normal Oropharynx] : normal oropharynx [Neck Appearance] : the appearance of the neck was normal [Neck Cervical Mass (___cm)] : no neck mass was observed [Jugular Venous Distention Increased] : there was no jugular-venous distention [Thyroid Diffuse Enlargement] : the thyroid was not enlarged [Thyroid Nodule] : there were no palpable thyroid nodules [Heart Rate And Rhythm] : heart rate and rhythm were normal [Heart Sounds] : normal S1 and S2 [Murmurs] : no murmurs present [FreeTextEntry1] : Diffuse wheezing and rhonchi noted [Abdomen Soft] : soft [Abdomen Tenderness] : non-tender [Abdomen Mass (___ Cm)] : no abdominal mass palpated [Abnormal Walk] : normal gait [Gait - Sufficient For Exercise Testing] : the gait was sufficient for exercise testing [Nail Clubbing] : no clubbing of the fingernails [Cyanosis, Localized] : no localized cyanosis [Petechial Hemorrhages (___cm)] : no petechial hemorrhages [] : no ischemic changes [Deep Tendon Reflexes (DTR)] : deep tendon reflexes were 2+ and symmetric [Sensation] : the sensory exam was normal to light touch and pinprick [No Focal Deficits] : no focal deficits [Oriented To Time, Place, And Person] : oriented to person, place, and time [Impaired Insight] : insight and judgment were intact [Affect] : the affect was normal

## 2019-10-29 NOTE — HISTORY OF PRESENT ILLNESS
[FreeTextEntry1] : History of COPD\par Lung cancer status post surgery\par Developed acute respiratory illness while on trip to [place] and exposed to cold weather\par Wife also ill\par Patient did not take antibiotics although Rxed

## 2019-10-29 NOTE — ASSESSMENT
[FreeTextEntry1] : Would take short course oral steroids in addition to antibiotics\par Continue rescue inhaler

## 2019-10-30 ENCOUNTER — TRANSCRIPTION ENCOUNTER (OUTPATIENT)
Age: 74
End: 2019-10-30

## 2019-11-03 LAB
APPEARANCE: CLEAR
BACTERIA UR CULT: NORMAL
BACTERIA: NEGATIVE
BASOPHILS # BLD AUTO: 0.03 K/UL
BASOPHILS NFR BLD AUTO: 0.5 %
BILIRUBIN URINE: NEGATIVE
BLOOD URINE: NEGATIVE
COLOR: COLORLESS
EOSINOPHIL # BLD AUTO: 0.1 K/UL
EOSINOPHIL NFR BLD AUTO: 1.8 %
GLUCOSE QUALITATIVE U: NEGATIVE
HCT VFR BLD CALC: 39.5 %
HGB BLD-MCNC: 12.2 G/DL
HYALINE CASTS: 0 /LPF
IMM GRANULOCYTES NFR BLD AUTO: 0.4 %
KETONES URINE: NEGATIVE
LEUKOCYTE ESTERASE URINE: NEGATIVE
LYMPHOCYTES # BLD AUTO: 0.89 K/UL
LYMPHOCYTES NFR BLD AUTO: 16.1 %
MAN DIFF?: NORMAL
MCHC RBC-ENTMCNC: 30.3 PG
MCHC RBC-ENTMCNC: 30.9 GM/DL
MCV RBC AUTO: 98.3 FL
MICROSCOPIC-UA: NORMAL
MONOCYTES # BLD AUTO: 0.6 K/UL
MONOCYTES NFR BLD AUTO: 10.8 %
NEUTROPHILS # BLD AUTO: 3.89 K/UL
NEUTROPHILS NFR BLD AUTO: 70.4 %
NITRITE URINE: NEGATIVE
PH URINE: 6.5
PLATELET # BLD AUTO: 357 K/UL
PROTEIN URINE: NEGATIVE
PSA FREE FLD-MCNC: 22 %
PSA FREE SERPL-MCNC: 1.93 NG/ML
PSA SERPL-MCNC: 8.77 NG/ML
RBC # BLD: 4.02 M/UL
RBC # FLD: 14.2 %
RED BLOOD CELLS URINE: 0 /HPF
SPECIFIC GRAVITY URINE: 1.01
SQUAMOUS EPITHELIAL CELLS: 0 /HPF
URINE CYTOLOGY: NORMAL
UROBILINOGEN URINE: NORMAL
WBC # FLD AUTO: 5.53 K/UL
WHITE BLOOD CELLS URINE: 0 /HPF

## 2019-12-11 ENCOUNTER — APPOINTMENT (OUTPATIENT)
Dept: INTERNAL MEDICINE | Facility: CLINIC | Age: 74
End: 2019-12-11
Payer: MEDICARE

## 2019-12-11 ENCOUNTER — NON-APPOINTMENT (OUTPATIENT)
Age: 74
End: 2019-12-11

## 2019-12-11 VITALS
TEMPERATURE: 98.2 F | HEART RATE: 74 BPM | SYSTOLIC BLOOD PRESSURE: 124 MMHG | DIASTOLIC BLOOD PRESSURE: 66 MMHG | BODY MASS INDEX: 24.22 KG/M2 | HEIGHT: 62.5 IN | WEIGHT: 135 LBS | OXYGEN SATURATION: 95 %

## 2019-12-11 DIAGNOSIS — Z23 ENCOUNTER FOR IMMUNIZATION: ICD-10-CM

## 2019-12-11 PROCEDURE — G0439: CPT

## 2019-12-11 PROCEDURE — 93000 ELECTROCARDIOGRAM COMPLETE: CPT | Mod: 59

## 2019-12-11 PROCEDURE — 90471 IMMUNIZATION ADMIN: CPT

## 2019-12-11 PROCEDURE — G0444 DEPRESSION SCREEN ANNUAL: CPT

## 2019-12-11 PROCEDURE — 90750 HZV VACC RECOMBINANT IM: CPT

## 2019-12-11 PROCEDURE — 36415 COLL VENOUS BLD VENIPUNCTURE: CPT

## 2019-12-11 RX ORDER — PREDNISONE 20 MG/1
20 TABLET ORAL
Qty: 15 | Refills: 0 | Status: DISCONTINUED | COMMUNITY
Start: 2019-10-28 | End: 2019-12-11

## 2019-12-11 NOTE — PHYSICAL EXAM
[No Acute Distress] : no acute distress [Well Nourished] : well nourished [Well-Appearing] : well-appearing [Normal Voice/Communication] : normal voice/communication [Well Developed] : well developed [Normal Sclera/Conjunctiva] : normal sclera/conjunctiva [PERRL] : pupils equal round and reactive to light [Normal Outer Ear/Nose] : the outer ears and nose were normal in appearance [Normal TMs] : both tympanic membranes were normal [Normal Oropharynx] : the oropharynx was normal [No JVD] : no jugular venous distention [No Lymphadenopathy] : no lymphadenopathy [Supple] : supple [Thyroid Normal, No Nodules] : the thyroid was normal and there were no nodules present [No Respiratory Distress] : no respiratory distress  [No Accessory Muscle Use] : no accessory muscle use [Clear to Auscultation] : lungs were clear to auscultation bilaterally [Normal Rate] : normal rate  [Regular Rhythm] : with a regular rhythm [No Murmur] : no murmur heard [Normal S1, S2] : normal S1 and S2 [No Carotid Bruits] : no carotid bruits [No Varicosities] : no varicosities [No Abdominal Bruit] : a ~M bruit was not heard ~T in the abdomen [No Edema] : there was no peripheral edema [No Palpable Aorta] : no palpable aorta [No Extremity Clubbing/Cyanosis] : no extremity clubbing/cyanosis [Soft] : abdomen soft [Non Tender] : non-tender [Non-distended] : non-distended [No Masses] : no abdominal mass palpated [No HSM] : no HSM [Normal Bowel Sounds] : normal bowel sounds [Normal Supraclavicular Nodes] : no supraclavicular lymphadenopathy [Normal Posterior Cervical Nodes] : no posterior cervical lymphadenopathy [Normal Anterior Cervical Nodes] : no anterior cervical lymphadenopathy [No Spinal Tenderness] : no spinal tenderness [No Joint Swelling] : no joint swelling [Grossly Normal Strength/Tone] : grossly normal strength/tone [No Rash] : no rash [Coordination Grossly Intact] : coordination grossly intact [No Focal Deficits] : no focal deficits [Normal Gait] : normal gait [Speech Grossly Normal] : speech grossly normal [Normal Affect] : the affect was normal [Alert and Oriented x3] : oriented to person, place, and time [Normal Mood] : the mood was normal [de-identified] : mildly reduced pedal pulses b/l  [Normal Insight/Judgement] : insight and judgment were intact

## 2019-12-11 NOTE — ASSESSMENT
[FreeTextEntry1] : discussed w pt in detail \par \par reviewed his progress over past year \par doing well overall since lung wedge resection of adenocarcinoma, stopped smoking. COPD remains clinically not an issue from pts perspective though clearly is significant based on imaging, PFTs. he had influenza vaccine this season. he declines to use maintenance inhalers. \par \par cont current rx\par \par check routine labs as below\par \par had recent f/u w Dr Vidales GI,Crohns disease is stable, not due for repeat colonoscopy as yet \par \par continued completed smoking cessation is advised \par \par discussed shingrix vaccine in detail, first dose administered today , return in 2-6 months for second dose \par \par RTO 6 months for f/u or earlier prn if any new concerns

## 2019-12-11 NOTE — HEALTH RISK ASSESSMENT
[] : No [No] : In the past 12 months have you used drugs other than those required for medical reasons? No [30 or more] : 30 or more [No falls in past year] : Patient reported no falls in the past year [YearQuit] : 2019 [None] : None [Employed] : employed [] :  [Fully functional (bathing, dressing, toileting, transferring, walking, feeding)] : Fully functional (bathing, dressing, toileting, transferring, walking, feeding) [Fully functional (using the telephone, shopping, preparing meals, housekeeping, doing laundry, using] : Fully functional and needs no help or supervision to perform IADLs (using the telephone, shopping, preparing meals, housekeeping, doing laundry, using transportation, managing medications and managing finances) [ColonoscopyDate] : 03/14

## 2019-12-11 NOTE — REVIEW OF SYSTEMS
[Dysuria] : no dysuria [Incontinence] : no incontinence [Hesitancy] : hesitancy [Frequency] : no frequency [Hematuria] : no hematuria [Nocturia] : nocturia [Negative] : Psychiatric

## 2019-12-11 NOTE — HISTORY OF PRESENT ILLNESS
[de-identified] : 73 y/o man presents for annual physical exam, update on recent medical progress over past year. feeling well overall currently, no new concerns. 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 \par \par he has remained completely abstinent from smoking since his surgery and feels he is doing well \par HTN controlled on rx \par BPH symptoms relatively controlled w elevated PSA stable, following w Dr Sanchez urology \par Crohns disease stable and asymptomatic on [place] following w Dr Timmy Vidales GI w recent visit. his weight has increased over past year \par

## 2020-01-17 ENCOUNTER — APPOINTMENT (OUTPATIENT)
Dept: PULMONOLOGY | Facility: CLINIC | Age: 75
End: 2020-01-17
Payer: MEDICARE

## 2020-01-17 VITALS
SYSTOLIC BLOOD PRESSURE: 136 MMHG | HEART RATE: 89 BPM | DIASTOLIC BLOOD PRESSURE: 84 MMHG | OXYGEN SATURATION: 93 % | TEMPERATURE: 98.8 F

## 2020-01-17 PROCEDURE — 94010 BREATHING CAPACITY TEST: CPT

## 2020-01-17 PROCEDURE — 99213 OFFICE O/P EST LOW 20 MIN: CPT | Mod: 25

## 2020-01-18 NOTE — PHYSICAL EXAM
[General Appearance - Well Developed] : well developed [Well Groomed] : well groomed [General Appearance - Well Nourished] : well nourished [Normal Appearance] : normal appearance [No Deformities] : no deformities [General Appearance - In No Acute Distress] : no acute distress [Normal Conjunctiva] : the conjunctiva exhibited no abnormalities [Eyelids - No Xanthelasma] : the eyelids demonstrated no xanthelasmas [Normal Oropharynx] : normal oropharynx [Neck Appearance] : the appearance of the neck was normal [Jugular Venous Distention Increased] : there was no jugular-venous distention [Neck Cervical Mass (___cm)] : no neck mass was observed [Thyroid Diffuse Enlargement] : the thyroid was not enlarged [Heart Rate And Rhythm] : heart rate and rhythm were normal [Thyroid Nodule] : there were no palpable thyroid nodules [Respiration, Rhythm And Depth] : normal respiratory rhythm and effort [Murmurs] : no murmurs present [Heart Sounds] : normal S1 and S2 [FreeTextEntry1] : Diminished breath sounds bilaterally [Auscultation Breath Sounds / Voice Sounds] : lungs were clear to auscultation bilaterally [Abdomen Tenderness] : non-tender [Abdomen Soft] : soft [Abdomen Mass (___ Cm)] : no abdominal mass palpated [Abnormal Walk] : normal gait [Gait - Sufficient For Exercise Testing] : the gait was sufficient for exercise testing [Nail Clubbing] : no clubbing of the fingernails [Cyanosis, Localized] : no localized cyanosis [Petechial Hemorrhages (___cm)] : no petechial hemorrhages [] : no ischemic changes [Deep Tendon Reflexes (DTR)] : deep tendon reflexes were 2+ and symmetric [Sensation] : the sensory exam was normal to light touch and pinprick [No Focal Deficits] : no focal deficits [Oriented To Time, Place, And Person] : oriented to person, place, and time [Impaired Insight] : insight and judgment were intact [Affect] : the affect was normal

## 2020-01-18 NOTE — HISTORY OF PRESENT ILLNESS
[FreeTextEntry1] : History of COPD\par Lung cancer status post surgery\par Noncompliant with inhalers\par still with some shortness of breath

## 2020-01-29 LAB
ALBUMIN SERPL ELPH-MCNC: 4.3 G/DL
ALP BLD-CCNC: 72 U/L
ALT SERPL-CCNC: 12 U/L
ANION GAP SERPL CALC-SCNC: 12 MMOL/L
APPEARANCE: CLEAR
AST SERPL-CCNC: 14 U/L
BASOPHILS # BLD AUTO: 0.05 K/UL
BASOPHILS NFR BLD AUTO: 0.7 %
BILIRUB SERPL-MCNC: 0.3 MG/DL
BILIRUBIN URINE: NEGATIVE
BLOOD URINE: NEGATIVE
BUN SERPL-MCNC: 27 MG/DL
CALCIUM SERPL-MCNC: 9.7 MG/DL
CHLORIDE SERPL-SCNC: 102 MMOL/L
CHOLEST SERPL-MCNC: 238 MG/DL
CHOLEST/HDLC SERPL: 2.8 RATIO
CO2 SERPL-SCNC: 27 MMOL/L
COLOR: NORMAL
CREAT SERPL-MCNC: 1.09 MG/DL
EOSINOPHIL # BLD AUTO: 0.14 K/UL
EOSINOPHIL NFR BLD AUTO: 2.1 %
ESTIMATED AVERAGE GLUCOSE: 117 MG/DL
GLUCOSE QUALITATIVE U: NEGATIVE
GLUCOSE SERPL-MCNC: 101 MG/DL
HBA1C MFR BLD HPLC: 5.7 %
HCT VFR BLD CALC: 43.2 %
HDLC SERPL-MCNC: 86 MG/DL
HGB BLD-MCNC: 12.7 G/DL
IMM GRANULOCYTES NFR BLD AUTO: 0.1 %
KETONES URINE: NEGATIVE
LDLC SERPL CALC-MCNC: 136 MG/DL
LEUKOCYTE ESTERASE URINE: NEGATIVE
LYMPHOCYTES # BLD AUTO: 0.93 K/UL
LYMPHOCYTES NFR BLD AUTO: 13.7 %
MAN DIFF?: NORMAL
MCHC RBC-ENTMCNC: 29.3 PG
MCHC RBC-ENTMCNC: 29.4 GM/DL
MCV RBC AUTO: 99.5 FL
MONOCYTES # BLD AUTO: 0.74 K/UL
MONOCYTES NFR BLD AUTO: 10.9 %
NEUTROPHILS # BLD AUTO: 4.9 K/UL
NEUTROPHILS NFR BLD AUTO: 72.5 %
NITRITE URINE: NEGATIVE
PH URINE: 6
PLATELET # BLD AUTO: 347 K/UL
POTASSIUM SERPL-SCNC: 5.1 MMOL/L
PROT SERPL-MCNC: 6.7 G/DL
PROTEIN URINE: NEGATIVE
RBC # BLD: 4.34 M/UL
RBC # FLD: 16.9 %
SODIUM SERPL-SCNC: 141 MMOL/L
SPECIFIC GRAVITY URINE: 1.02
T4 FREE SERPL-MCNC: 1.2 NG/DL
TRIGL SERPL-MCNC: 79 MG/DL
TSH SERPL-ACNC: 0.98 UIU/ML
UROBILINOGEN URINE: NORMAL
WBC # FLD AUTO: 6.77 K/UL

## 2020-03-04 ENCOUNTER — TRANSCRIPTION ENCOUNTER (OUTPATIENT)
Age: 75
End: 2020-03-04

## 2020-03-04 ENCOUNTER — APPOINTMENT (OUTPATIENT)
Dept: UROLOGY | Facility: CLINIC | Age: 75
End: 2020-03-04

## 2020-03-09 ENCOUNTER — APPOINTMENT (OUTPATIENT)
Dept: INTERNAL MEDICINE | Facility: CLINIC | Age: 75
End: 2020-03-09
Payer: MEDICARE

## 2020-03-09 PROCEDURE — 90471 IMMUNIZATION ADMIN: CPT

## 2020-03-09 PROCEDURE — 90750 HZV VACC RECOMBINANT IM: CPT

## 2020-03-11 ENCOUNTER — APPOINTMENT (OUTPATIENT)
Dept: UROLOGY | Facility: CLINIC | Age: 75
End: 2020-03-11
Payer: MEDICARE

## 2020-03-11 VITALS
RESPIRATION RATE: 18 BRPM | BODY MASS INDEX: 24.22 KG/M2 | SYSTOLIC BLOOD PRESSURE: 165 MMHG | HEIGHT: 62.5 IN | HEART RATE: 80 BPM | DIASTOLIC BLOOD PRESSURE: 99 MMHG | WEIGHT: 135 LBS

## 2020-03-11 PROCEDURE — 99214 OFFICE O/P EST MOD 30 MIN: CPT

## 2020-03-12 NOTE — ADDENDUM
[FreeTextEntry1] : This note was authored by Ángela Holguin, working as scribe for Dr. Cricket Sanchez.\par \par I, Dr. Cricket Sanchez, have have reviewed the content of this note and confirm it is true and accurate. I personally performed the history and physical examination and made all the decisions. \par \par 03/11/2020

## 2020-03-12 NOTE — HISTORY OF PRESENT ILLNESS
[FreeTextEntry1] : Mr. Ramos is a 73 year old male presented with elevated PSA. PSA from 1/18/18 was 7.43 ng/mL and previously 8.11 ng/mL on 12/13/17. Patient had two prostate needle biopsies in the past and pathology on 3/9/10 showed benign prostatic tissue and focal mild chronic prostatitis. Patient has seen Dr. Manuel two years ago for erectile dysfunction and decreased libido.  Had tried Trimix injections and oral medications for ED in the past but notes they do not improved his erections. Patient's wife has lung cancer and breast cancer. Has Crohn's disease but is not currently active. Current smoker. \par 8/16/18: The patient returned and reported he finished his Bactrim course. PSA from 8/13/18 was 11.77 ng/mL and a percent free PSA of 16.1%. \par 9/5/18: The patient returned to discuss MRI results. MRI of the prostate from 8/18/18 findings showed no suspicious lesions for clinically significant prostate cancer. If total PSA today is over 15 ng/mL and percent free PSA below 14%, we will proceed with biopsy. \par 11/21/18: The patient returned and reported his urination is adequate. Patient denied dysuria, gross hematuria, urgency, or incontinence. Wakes up 1-2 times during the night to urinate. Complained of ED, has tried viagra and Trimix injections in the past. Is willing to reconsider trying 7 tablets of Sildenafil. PSA from 11/12/18 was 9.50 ng/mL, current PSA is within range of patient, will watch PSA. \par 4/15/19: The patient returned today and reported that his urination is satisfactory. Patient denies dysuria, gross hematuria, pushing or straining to urinate, urgency, or incontinence. Wakes up once during the night to urinate. Patient hasn't tried the Sildenafil that I prescribed during his last visit. 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 Today he states that his urination is satisfactory. Patient denies dysuria, gross hematuria, urgency or incontinence. He is feeling very good overall. He has a Hx of elevated PSA and his most recent PSA from 9/17/19 was 8.77 ng/mL. An MRI was completed last year and determined PIRADS 2. A biopsy was not necessary at that time. \par \par 03/11/2020: Patient presents today for a follow up. His PSA as of 3/9/2020 was 10.10 ng/mL which has elevated from prior PSA of 8.77 from 9/17/2019. His creatinine was 1.21mg/dL. The patient denies chest pains and constipation.

## 2020-03-12 NOTE — ASSESSMENT
[FreeTextEntry1] : Mr. Ramos is a 73 year old male presented with elevated PSA. PSA from 1/18/18 was 7.43 ng/mL and previously 8.11 ng/mL on 12/13/17. Patient had two prostate needle biopsies in the past and pathology on 3/9/10 showed benign prostatic tissue and focal mild chronic prostatitis. Patient has seen Dr. Manuel two years ago for erectile dysfunction and decreased libido.  Had tried Trimix injections and oral medications for ED in the past but notes they do not improved his erections. Patient's wife has lung cancer and breast cancer. Has Crohn's disease but is not currently active. Current smoker. \par 8/16/18: The patient returned and reported he finished his Bactrim course. PSA from 8/13/18 was 11.77 ng/mL and a percent free PSA of 16.1%. \par 9/5/18: The patient returned to discuss MRI results. MRI of the prostate from 8/18/18 findings showed no suspicious lesions for clinically significant prostate cancer. If total PSA today is over 15 ng/mL and percent free PSA below 14%, we will proceed with biopsy. \par 11/21/18: The patient returned and reported his urination is adequate. Patient denied dysuria, gross hematuria, urgency, or incontinence. Wakes up 1-2 times during the night to urinate. Complained of ED, has tried viagra and Trimix injections in the past. Is willing to reconsider trying 7 tablets of Sildenafil. PSA from 11/12/18 was 9.50 ng/mL, current PSA is within range of patient, will watch PSA. \par 4/15/19: The patient returned today and reported that his urination is satisfactory. Patient denies dysuria, gross hematuria, pushing or straining to urinate, urgency, or incontinence. Wakes up once during the night to urinate. Patient hasn't tried the Sildenafil that I prescribed during his last visit. 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 Today he states that his urination is satisfactory. Patient denies dysuria, gross hematuria, urgency or incontinence. He is feeling very good overall. He has a Hx of elevated PSA and his most recent PSA from 9/17/19 was 8.77 ng/mL. An MRI was completed last year and determined PIRADS 2. A biopsy was not necessary at that time. \par \par 03/11/2020: Patient presents today for a follow up. His PSA as of 3/9/2020 was 10.10 ng/mL which has elevated from prior PSA of 8.77 from 9/17/2019. His creatinine was 1.21 mg/dL. The patient denies chest pains and constipation.\par \par The patient produced a urine sample which will be sent for urinalysis, urine cytology, and urine culture. \par \par An MRI PELVIS has been ordered today and will be obtained by the office. \par \par Patient should follow up in 3 weeks or sooner if he experiences urinary difficulties.

## 2020-03-12 NOTE — PHYSICAL EXAM
[General Appearance - Well Developed] : well developed [General Appearance - Well Nourished] : well nourished [Normal Appearance] : normal appearance [Well Groomed] : well groomed [General Appearance - In No Acute Distress] : no acute distress [Abdomen Soft] : soft [Abdomen Tenderness] : non-tender [Costovertebral Angle Tenderness] : no ~M costovertebral angle tenderness [Prostate Tenderness] : the prostate was not tender [No Prostate Nodules] : no prostate nodules [Prostate Size ___ gm] : prostate size [unfilled] gm [Skin Color & Pigmentation] : normal skin color and pigmentation [Heart Rate And Rhythm] : Heart rate and rhythm were normal [Edema] : no peripheral edema [] : no respiratory distress [Respiration, Rhythm And Depth] : normal respiratory rhythm and effort [Exaggerated Use Of Accessory Muscles For Inspiration] : no accessory muscle use [Oriented To Time, Place, And Person] : oriented to person, place, and time [Affect] : the affect was normal [Mood] : the mood was normal [Not Anxious] : not anxious [Normal Station and Gait] : the gait and station were normal for the patient's age [No Focal Deficits] : no focal deficits [No Palpable Adenopathy] : no palpable adenopathy [Cervical Lymph Nodes Enlarged Posterior Bilaterally] : posterior cervical [Cervical Lymph Nodes Enlarged Anterior Bilaterally] : anterior cervical [Supraclavicular Lymph Nodes Enlarged Bilaterally] : supraclavicular [FreeTextEntry1] : No submandibular gland tenderness.\par

## 2020-03-12 NOTE — REVIEW OF SYSTEMS
[Eyesight Problems] : eyesight problems [Nocturia] : nocturia [Erectile Dysfunction] : erectile dysfunction [Feeling Poorly] : not feeling poorly [Feeling Tired] : not feeling tired [Loss Of Hearing] : no hearing loss [Nosebleeds] : no nosebleeds [Chest Pain] : no chest pain [Cough] : no cough [Constipation] : no constipation [Dysuria] : no dysuria [Incontinence] : no incontinence [Hesitancy] : no urinary hesitancy [Testicular Pain] : no testicular pain [Anxiety] : no anxiety [Depression] : no depression [Easy Bleeding] : no tendency for easy bleeding [Easy Bruising] : no tendency for easy bruising

## 2020-03-16 LAB
ALBUMIN SERPL ELPH-MCNC: 4.2 G/DL
ALP BLD-CCNC: 75 U/L
ALT SERPL-CCNC: 12 U/L
ANION GAP SERPL CALC-SCNC: 11 MMOL/L
APPEARANCE: CLEAR
APPEARANCE: CLEAR
AST SERPL-CCNC: 15 U/L
BACTERIA UR CULT: NORMAL
BACTERIA UR CULT: NORMAL
BACTERIA: NEGATIVE
BACTERIA: NEGATIVE
BASOPHILS # BLD AUTO: 0.05 K/UL
BASOPHILS NFR BLD AUTO: 0.9 %
BILIRUB SERPL-MCNC: 0.3 MG/DL
BILIRUBIN URINE: NEGATIVE
BILIRUBIN URINE: NEGATIVE
BLOOD URINE: NEGATIVE
BLOOD URINE: NEGATIVE
BUN SERPL-MCNC: 20 MG/DL
CALCIUM SERPL-MCNC: 8.9 MG/DL
CHLORIDE SERPL-SCNC: 101 MMOL/L
CO2 SERPL-SCNC: 29 MMOL/L
COLOR: YELLOW
COLOR: YELLOW
CREAT SERPL-MCNC: 1.21 MG/DL
EOSINOPHIL # BLD AUTO: 0.2 K/UL
EOSINOPHIL NFR BLD AUTO: 3.6 %
GLUCOSE QUALITATIVE U: NEGATIVE
GLUCOSE QUALITATIVE U: NEGATIVE
GLUCOSE SERPL-MCNC: 103 MG/DL
HCT VFR BLD CALC: 42.9 %
HGB BLD-MCNC: 12.2 G/DL
HYALINE CASTS: 1 /LPF
HYALINE CASTS: 1 /LPF
IMM GRANULOCYTES NFR BLD AUTO: 0.2 %
KETONES URINE: NEGATIVE
KETONES URINE: NEGATIVE
LEUKOCYTE ESTERASE URINE: NEGATIVE
LEUKOCYTE ESTERASE URINE: NEGATIVE
LYMPHOCYTES # BLD AUTO: 1.27 K/UL
LYMPHOCYTES NFR BLD AUTO: 23.1 %
MAN DIFF?: NORMAL
MCHC RBC-ENTMCNC: 28.4 GM/DL
MCHC RBC-ENTMCNC: 28.8 PG
MCV RBC AUTO: 101.2 FL
MICROSCOPIC-UA: NORMAL
MICROSCOPIC-UA: NORMAL
MONOCYTES # BLD AUTO: 0.73 K/UL
MONOCYTES NFR BLD AUTO: 13.3 %
NEUTROPHILS # BLD AUTO: 3.24 K/UL
NEUTROPHILS NFR BLD AUTO: 58.9 %
NITRITE URINE: NEGATIVE
NITRITE URINE: NEGATIVE
PH URINE: 5.5
PH URINE: 6
PLATELET # BLD AUTO: 392 K/UL
POTASSIUM SERPL-SCNC: 4.7 MMOL/L
PROT SERPL-MCNC: 6.5 G/DL
PROTEIN URINE: NORMAL
PROTEIN URINE: NORMAL
PSA FREE FLD-MCNC: 24 %
PSA FREE SERPL-MCNC: 2.41 NG/ML
PSA SERPL-MCNC: 10.1 NG/ML
RBC # BLD: 4.24 M/UL
RBC # FLD: 15.6 %
RED BLOOD CELLS URINE: 1 /HPF
RED BLOOD CELLS URINE: 1 /HPF
SODIUM SERPL-SCNC: 141 MMOL/L
SPECIFIC GRAVITY URINE: 1.02
SPECIFIC GRAVITY URINE: 1.03
SQUAMOUS EPITHELIAL CELLS: 0 /HPF
SQUAMOUS EPITHELIAL CELLS: 0 /HPF
TESTOST SERPL-MCNC: 544 NG/DL
URINE CYTOLOGY: NORMAL
URINE CYTOLOGY: NORMAL
UROBILINOGEN URINE: ABNORMAL
UROBILINOGEN URINE: NORMAL
WBC # FLD AUTO: 5.5 K/UL
WHITE BLOOD CELLS URINE: 1 /HPF
WHITE BLOOD CELLS URINE: 1 /HPF

## 2020-04-06 ENCOUNTER — RX RENEWAL (OUTPATIENT)
Age: 75
End: 2020-04-06

## 2020-04-29 ENCOUNTER — APPOINTMENT (OUTPATIENT)
Dept: MRI IMAGING | Facility: IMAGING CENTER | Age: 75
End: 2020-04-29

## 2020-05-04 ENCOUNTER — APPOINTMENT (OUTPATIENT)
Dept: UROLOGY | Facility: CLINIC | Age: 75
End: 2020-05-04

## 2020-05-12 ENCOUNTER — APPOINTMENT (OUTPATIENT)
Dept: MRI IMAGING | Facility: IMAGING CENTER | Age: 75
End: 2020-05-12

## 2020-05-30 ENCOUNTER — RX RENEWAL (OUTPATIENT)
Age: 75
End: 2020-05-30

## 2020-06-02 ENCOUNTER — RX RENEWAL (OUTPATIENT)
Age: 75
End: 2020-06-02

## 2020-06-16 ENCOUNTER — APPOINTMENT (OUTPATIENT)
Dept: INTERNAL MEDICINE | Facility: CLINIC | Age: 75
End: 2020-06-16
Payer: MEDICARE

## 2020-06-16 VITALS
HEIGHT: 62.5 IN | WEIGHT: 137 LBS | TEMPERATURE: 98.3 F | SYSTOLIC BLOOD PRESSURE: 160 MMHG | OXYGEN SATURATION: 98 % | BODY MASS INDEX: 24.58 KG/M2 | HEART RATE: 66 BPM | DIASTOLIC BLOOD PRESSURE: 87 MMHG

## 2020-06-16 PROCEDURE — 36415 COLL VENOUS BLD VENIPUNCTURE: CPT

## 2020-06-16 PROCEDURE — 99214 OFFICE O/P EST MOD 30 MIN: CPT | Mod: 25

## 2020-06-16 NOTE — REVIEW OF SYSTEMS
[Nocturia] : nocturia [Negative] : Heme/Lymph [Wheezing] : no wheezing [Shortness Of Breath] : no shortness of breath [Dyspnea on Exertion] : not dyspnea on exertion [Cough] : no cough [Abdominal Pain] : no abdominal pain [Diarrhea] : no diarrhea [Vomiting] : no vomiting [Incontinence] : no incontinence

## 2020-06-16 NOTE — ASSESSMENT
[FreeTextEntry1] : discussed w pt \par \par doing well clinically , no new concerns\par \par cont current rx\par \par check COVID19 Ab \par reviewed labs from 3 months ago w Dr Sanchez, monitoring chronic PSA elevation w BPH, scheduled for MRI prostate for further eval \par \par pulm f/u as scheduled, continues to maintain smoking cessation, COPD stable \par \par GI f/u w Dr Vidales as scheduled for Crohns \par \par RTO about 6 months for f/u or earlier prn if any new concerns

## 2020-06-16 NOTE — HISTORY OF PRESENT ILLNESS
[de-identified] : presents for f/u visit to review chronic conditions. overall feeling well, no new concerns during COVID19 pandemic. following w multiple specialists. lab testing done 3 months ago, reviewed, elevated PSA relatively stable. \par \par COPD/emphysema stable, using albuterol prn, declines to use other inhalers for maintenance, following w Dr Pearce \par s/p L lung adenocarcinoma resection w Dr Billings at [facility] without complications. \par \par he has remained completely abstinent from smoking\par HTN controlled on rx \par BPH symptoms relatively controlled w elevated PSA stable, following w Dr Sanchez urology , scheduled MRI of prostate \par Crohns disease stable and asymptomatic on Lialda following w Dr Timmy Vidales GI\par

## 2020-06-16 NOTE — PHYSICAL EXAM
[No Acute Distress] : no acute distress [Well Nourished] : well nourished [Well-Appearing] : well-appearing [Well Developed] : well developed [Normal Voice/Communication] : normal voice/communication [No JVD] : no jugular venous distention [No Lymphadenopathy] : no lymphadenopathy [Supple] : supple [No Respiratory Distress] : no respiratory distress  [No Accessory Muscle Use] : no accessory muscle use [Clear to Auscultation] : lungs were clear to auscultation bilaterally [Regular Rhythm] : with a regular rhythm [Normal Rate] : normal rate  [Normal S1, S2] : normal S1 and S2 [No Murmur] : no murmur heard [No Carotid Bruits] : no carotid bruits [No Abdominal Bruit] : a ~M bruit was not heard ~T in the abdomen [No Edema] : there was no peripheral edema [No Varicosities] : no varicosities [No Extremity Clubbing/Cyanosis] : no extremity clubbing/cyanosis [No Palpable Aorta] : no palpable aorta [Normal Posterior Cervical Nodes] : no posterior cervical lymphadenopathy [Normal Supraclavicular Nodes] : no supraclavicular lymphadenopathy [Normal Anterior Cervical Nodes] : no anterior cervical lymphadenopathy [Speech Grossly Normal] : speech grossly normal [Normal Affect] : the affect was normal [Normal Mood] : the mood was normal [Alert and Oriented x3] : oriented to person, place, and time [Normal Insight/Judgement] : insight and judgment were intact [de-identified] : mildly reduced pedal pulses b/l

## 2020-06-17 LAB
SARS-COV-2 IGG SERPL IA-ACNC: 0.1 INDEX
SARS-COV-2 IGG SERPL QL IA: NEGATIVE

## 2020-06-19 ENCOUNTER — APPOINTMENT (OUTPATIENT)
Dept: PULMONOLOGY | Facility: CLINIC | Age: 75
End: 2020-06-19

## 2020-07-03 ENCOUNTER — RX RENEWAL (OUTPATIENT)
Age: 75
End: 2020-07-03

## 2020-07-16 ENCOUNTER — RESULT REVIEW (OUTPATIENT)
Age: 75
End: 2020-07-16

## 2020-07-16 ENCOUNTER — APPOINTMENT (OUTPATIENT)
Dept: MRI IMAGING | Facility: IMAGING CENTER | Age: 75
End: 2020-07-16
Payer: MEDICARE

## 2020-07-16 ENCOUNTER — OUTPATIENT (OUTPATIENT)
Dept: OUTPATIENT SERVICES | Facility: HOSPITAL | Age: 75
LOS: 1 days | End: 2020-07-16
Payer: COMMERCIAL

## 2020-07-16 DIAGNOSIS — Z98.89 OTHER SPECIFIED POSTPROCEDURAL STATES: Chronic | ICD-10-CM

## 2020-07-16 DIAGNOSIS — R97.20 ELEVATED PROSTATE SPECIFIC ANTIGEN [PSA]: ICD-10-CM

## 2020-07-16 DIAGNOSIS — Z90.89 ACQUIRED ABSENCE OF OTHER ORGANS: Chronic | ICD-10-CM

## 2020-07-16 DIAGNOSIS — Z00.8 ENCOUNTER FOR OTHER GENERAL EXAMINATION: ICD-10-CM

## 2020-07-16 PROCEDURE — A9585: CPT

## 2020-07-16 PROCEDURE — 76377 3D RENDER W/INTRP POSTPROCES: CPT

## 2020-07-16 PROCEDURE — 72197 MRI PELVIS W/O & W/DYE: CPT

## 2020-07-16 PROCEDURE — 72197 MRI PELVIS W/O & W/DYE: CPT | Mod: 26

## 2020-07-16 PROCEDURE — 76377 3D RENDER W/INTRP POSTPROCES: CPT | Mod: 26

## 2020-07-20 ENCOUNTER — RECORD ABSTRACTING (OUTPATIENT)
Age: 75
End: 2020-07-20

## 2020-08-03 DIAGNOSIS — Z20.828 CONTACT WITH AND (SUSPECTED) EXPOSURE TO OTHER VIRAL COMMUNICABLE DISEASES: ICD-10-CM

## 2020-08-11 ENCOUNTER — OUTPATIENT (OUTPATIENT)
Dept: OUTPATIENT SERVICES | Facility: HOSPITAL | Age: 75
LOS: 1 days | End: 2020-08-11
Payer: COMMERCIAL

## 2020-08-11 ENCOUNTER — RESULT REVIEW (OUTPATIENT)
Age: 75
End: 2020-08-11

## 2020-08-11 ENCOUNTER — APPOINTMENT (OUTPATIENT)
Dept: CT IMAGING | Facility: CLINIC | Age: 75
End: 2020-08-11
Payer: MEDICARE

## 2020-08-11 DIAGNOSIS — Z90.89 ACQUIRED ABSENCE OF OTHER ORGANS: Chronic | ICD-10-CM

## 2020-08-11 DIAGNOSIS — Z98.89 OTHER SPECIFIED POSTPROCEDURAL STATES: Chronic | ICD-10-CM

## 2020-08-11 DIAGNOSIS — Z00.8 ENCOUNTER FOR OTHER GENERAL EXAMINATION: ICD-10-CM

## 2020-08-11 PROCEDURE — 71250 CT THORAX DX C-: CPT | Mod: 26

## 2020-08-11 PROCEDURE — 71250 CT THORAX DX C-: CPT

## 2020-08-18 DIAGNOSIS — Z01.818 ENCOUNTER FOR OTHER PREPROCEDURAL EXAMINATION: ICD-10-CM

## 2020-08-19 ENCOUNTER — RESULT REVIEW (OUTPATIENT)
Age: 75
End: 2020-08-19

## 2020-08-20 ENCOUNTER — APPOINTMENT (OUTPATIENT)
Dept: DISASTER EMERGENCY | Facility: CLINIC | Age: 75
End: 2020-08-20

## 2020-08-21 LAB — SARS-COV-2 N GENE NPH QL NAA+PROBE: NOT DETECTED

## 2020-08-25 ENCOUNTER — APPOINTMENT (OUTPATIENT)
Dept: PULMONOLOGY | Facility: CLINIC | Age: 75
End: 2020-08-25
Payer: MEDICARE

## 2020-08-25 VITALS
SYSTOLIC BLOOD PRESSURE: 155 MMHG | HEIGHT: 64 IN | OXYGEN SATURATION: 97 % | WEIGHT: 136 LBS | RESPIRATION RATE: 12 BRPM | DIASTOLIC BLOOD PRESSURE: 86 MMHG | HEART RATE: 76 BPM | BODY MASS INDEX: 23.22 KG/M2

## 2020-08-25 PROCEDURE — 99213 OFFICE O/P EST LOW 20 MIN: CPT | Mod: 25

## 2020-08-25 PROCEDURE — ZZZZZ: CPT

## 2020-08-25 PROCEDURE — 94727 GAS DIL/WSHOT DETER LNG VOL: CPT

## 2020-08-25 PROCEDURE — 94729 DIFFUSING CAPACITY: CPT

## 2020-08-25 PROCEDURE — 94060 EVALUATION OF WHEEZING: CPT

## 2020-08-25 RX ORDER — UMECLIDINIUM 62.5 UG/1
62.5 AEROSOL, POWDER ORAL
Qty: 3 | Refills: 1 | Status: DISCONTINUED | COMMUNITY
Start: 2020-01-30 | End: 2020-08-25

## 2020-08-25 RX ORDER — FLUTICASONE FUROATE, UMECLIDINIUM BROMIDE AND VILANTEROL TRIFENATATE 100; 62.5; 25 UG/1; UG/1; UG/1
100-62.5-25 POWDER RESPIRATORY (INHALATION) DAILY
Qty: 1 | Refills: 2 | Status: DISCONTINUED | OUTPATIENT
Start: 2019-09-28 | End: 2020-08-25

## 2020-08-26 RX ORDER — AMOXICILLIN AND CLAVULANATE POTASSIUM 875; 125 MG/1; MG/1
875-125 TABLET, COATED ORAL
Qty: 14 | Refills: 0 | Status: DISCONTINUED | COMMUNITY
Start: 2019-10-07 | End: 2020-08-26

## 2020-08-26 NOTE — HISTORY OF PRESENT ILLNESS
[Former] : former [TextBox_4] : Follow-up for COPD\par Lung cancer status post surgery\par Some shortness of breath\par Compliant with inhalers [Never] : never

## 2020-08-26 NOTE — PHYSICAL EXAM
[No Acute Distress] : no acute distress [Normal Oropharynx] : normal oropharynx [Normal Appearance] : normal appearance [No Neck Mass] : no neck mass [Normal Rate/Rhythm] : normal rate/rhythm [No Murmurs] : no murmurs [Normal S1, S2] : normal s1, s2 [No Resp Distress] : no resp distress [Clear to Auscultation Bilaterally] : clear to auscultation bilaterally [No Abnormalities] : no abnormalities [Benign] : benign [Normal Gait] : normal gait [No Clubbing] : no clubbing [No Cyanosis] : no cyanosis [No Edema] : no edema [FROM] : FROM [Normal Color/ Pigmentation] : normal color/ pigmentation [No Focal Deficits] : no focal deficits [Oriented x3] : oriented x3 [Normal Affect] : normal affect

## 2020-10-08 ENCOUNTER — APPOINTMENT (OUTPATIENT)
Dept: DERMATOLOGY | Facility: CLINIC | Age: 75
End: 2020-10-08

## 2020-11-09 LAB — SARS-COV-2 N GENE NPH QL NAA+PROBE: NOT DETECTED

## 2020-11-10 ENCOUNTER — TRANSCRIPTION ENCOUNTER (OUTPATIENT)
Age: 75
End: 2020-11-10

## 2020-11-11 ENCOUNTER — RESULT REVIEW (OUTPATIENT)
Age: 75
End: 2020-11-11

## 2020-11-11 ENCOUNTER — OUTPATIENT (OUTPATIENT)
Dept: OUTPATIENT SERVICES | Facility: HOSPITAL | Age: 75
LOS: 1 days | End: 2020-11-11
Payer: COMMERCIAL

## 2020-11-11 DIAGNOSIS — Z98.89 OTHER SPECIFIED POSTPROCEDURAL STATES: Chronic | ICD-10-CM

## 2020-11-11 DIAGNOSIS — Z90.89 ACQUIRED ABSENCE OF OTHER ORGANS: Chronic | ICD-10-CM

## 2020-11-11 DIAGNOSIS — K22.10 ULCER OF ESOPHAGUS WITHOUT BLEEDING: ICD-10-CM

## 2020-11-11 DIAGNOSIS — D64.9 ANEMIA, UNSPECIFIED: ICD-10-CM

## 2020-11-11 PROCEDURE — 88341 IMHCHEM/IMCYTCHM EA ADD ANTB: CPT | Mod: 26

## 2020-11-11 PROCEDURE — 43239 EGD BIOPSY SINGLE/MULTIPLE: CPT

## 2020-11-11 PROCEDURE — 88305 TISSUE EXAM BY PATHOLOGIST: CPT

## 2020-11-11 PROCEDURE — 88305 TISSUE EXAM BY PATHOLOGIST: CPT | Mod: 26

## 2020-11-11 PROCEDURE — 88313 SPECIAL STAINS GROUP 2: CPT

## 2020-11-11 PROCEDURE — 88342 IMHCHEM/IMCYTCHM 1ST ANTB: CPT

## 2020-11-11 PROCEDURE — 88313 SPECIAL STAINS GROUP 2: CPT | Mod: 26

## 2020-11-11 PROCEDURE — 88341 IMHCHEM/IMCYTCHM EA ADD ANTB: CPT

## 2020-11-11 PROCEDURE — 88342 IMHCHEM/IMCYTCHM 1ST ANTB: CPT | Mod: 26

## 2020-11-12 LAB — SURGICAL PATHOLOGY STUDY: SIGNIFICANT CHANGE UP

## 2020-11-13 ENCOUNTER — APPOINTMENT (OUTPATIENT)
Dept: PULMONOLOGY | Facility: CLINIC | Age: 75
End: 2020-11-13

## 2020-11-13 ENCOUNTER — APPOINTMENT (OUTPATIENT)
Dept: PULMONOLOGY | Facility: CLINIC | Age: 75
End: 2020-11-13
Payer: MEDICARE

## 2020-11-13 ENCOUNTER — RX RENEWAL (OUTPATIENT)
Age: 75
End: 2020-11-13

## 2020-11-13 VITALS
HEIGHT: 64 IN | HEART RATE: 66 BPM | RESPIRATION RATE: 15 BRPM | OXYGEN SATURATION: 93 % | WEIGHT: 139 LBS | BODY MASS INDEX: 23.73 KG/M2 | TEMPERATURE: 97.8 F

## 2020-11-13 LAB — POCT - HEMOGLOBIN (HGB), QUANTITATIVE, TRANSCUTANEOUS: 9.6

## 2020-11-13 PROCEDURE — 94729 DIFFUSING CAPACITY: CPT

## 2020-11-13 PROCEDURE — 94060 EVALUATION OF WHEEZING: CPT

## 2020-11-13 PROCEDURE — ZZZZZ: CPT

## 2020-11-13 PROCEDURE — 94727 GAS DIL/WSHOT DETER LNG VOL: CPT

## 2020-11-13 PROCEDURE — 88738 HGB QUANT TRANSCUTANEOUS: CPT

## 2020-11-13 PROCEDURE — 99213 OFFICE O/P EST LOW 20 MIN: CPT | Mod: 25

## 2020-11-13 PROCEDURE — 99072 ADDL SUPL MATRL&STAF TM PHE: CPT

## 2020-11-14 NOTE — HISTORY OF PRESENT ILLNESS
[Former] : former [Never] : never [TextBox_4] : Follow-up for COPD\par Lung cancer status post surgery\par Some shortness of breath\par Compliant with inhalers

## 2020-12-07 ENCOUNTER — APPOINTMENT (OUTPATIENT)
Dept: PULMONOLOGY | Facility: CLINIC | Age: 75
End: 2020-12-07

## 2020-12-23 ENCOUNTER — NON-APPOINTMENT (OUTPATIENT)
Age: 75
End: 2020-12-23

## 2020-12-23 ENCOUNTER — APPOINTMENT (OUTPATIENT)
Dept: INTERNAL MEDICINE | Facility: CLINIC | Age: 75
End: 2020-12-23
Payer: MEDICARE

## 2020-12-23 VITALS
OXYGEN SATURATION: 93 % | WEIGHT: 146 LBS | BODY MASS INDEX: 25.87 KG/M2 | HEIGHT: 63 IN | SYSTOLIC BLOOD PRESSURE: 158 MMHG | DIASTOLIC BLOOD PRESSURE: 82 MMHG | TEMPERATURE: 97.7 F | HEART RATE: 81 BPM | RESPIRATION RATE: 18 BRPM

## 2020-12-23 DIAGNOSIS — Z11.59 ENCOUNTER FOR SCREENING FOR OTHER VIRAL DISEASES: ICD-10-CM

## 2020-12-23 PROCEDURE — 99072 ADDL SUPL MATRL&STAF TM PHE: CPT

## 2020-12-23 PROCEDURE — 36415 COLL VENOUS BLD VENIPUNCTURE: CPT

## 2020-12-23 PROCEDURE — 93000 ELECTROCARDIOGRAM COMPLETE: CPT | Mod: 59

## 2020-12-23 PROCEDURE — G0439: CPT

## 2020-12-23 PROCEDURE — G0444 DEPRESSION SCREEN ANNUAL: CPT

## 2020-12-23 RX ORDER — SUCRALFATE 1 G/1
1 TABLET ORAL
Refills: 0 | Status: ACTIVE | COMMUNITY

## 2020-12-27 NOTE — HISTORY OF PRESENT ILLNESS
[de-identified] : 74 y/o man presents for annual physical exam. he feels well overall currently, no new concerns. he is still teaching part time, remotely. \par \par recent GI workup w Dr Timmy Vidales. EGD shows esophageal ulceration. also had updated colonoscopy. he has maintained his weight \par \par COPD/emphysema which seems to be mostly asymptomatic, he is using albuterol prn, declines to use other inhalers for maintenance, following w Dr Pearce \par s/p L lung adenocarcinoma resection w Dr Billings at [facility] without complications. \par he has remained completely abstinent from smoking since his surgery\par HTN controlled on rx \par BPH symptoms relatively controlled w elevated PSA stable, following w Dr Sanchez urology , had MRI prostate \par Crohns disease stable and asymptomatic on Lialda following w Dr Timmy Vidales GI\par

## 2020-12-27 NOTE — REVIEW OF SYSTEMS
[Hesitancy] : hesitancy [Nocturia] : nocturia [Negative] : Heme/Lymph [Dysuria] : no dysuria [Incontinence] : no incontinence [Hematuria] : no hematuria [Frequency] : no frequency

## 2020-12-27 NOTE — HEALTH RISK ASSESSMENT
[30 or more] : 30 or more [No] : In the past 12 months have you used drugs other than those required for medical reasons? No [No falls in past year] : Patient reported no falls in the past year [None] : None [Employed] : employed [] :  [Fully functional (bathing, dressing, toileting, transferring, walking, feeding)] : Fully functional (bathing, dressing, toileting, transferring, walking, feeding) [Fully functional (using the telephone, shopping, preparing meals, housekeeping, doing laundry, using] : Fully functional and needs no help or supervision to perform IADLs (using the telephone, shopping, preparing meals, housekeeping, doing laundry, using transportation, managing medications and managing finances) [0] : 2) Feeling down, depressed, or hopeless: Not at all (0) [] : No [YearQuit] : 2019 [[id number]] : 0 [ColonoscopyDate] : 03/14

## 2020-12-27 NOTE — PHYSICAL EXAM
[No Acute Distress] : no acute distress [Well Nourished] : well nourished [Well Developed] : well developed [Well-Appearing] : well-appearing [Normal Voice/Communication] : normal voice/communication [Normal Sclera/Conjunctiva] : normal sclera/conjunctiva [PERRL] : pupils equal round and reactive to light [Normal Outer Ear/Nose] : the outer ears and nose were normal in appearance [Normal Oropharynx] : the oropharynx was normal [Normal TMs] : both tympanic membranes were normal [No JVD] : no jugular venous distention [No Lymphadenopathy] : no lymphadenopathy [Supple] : supple [Thyroid Normal, No Nodules] : the thyroid was normal and there were no nodules present [No Respiratory Distress] : no respiratory distress  [No Accessory Muscle Use] : no accessory muscle use [Clear to Auscultation] : lungs were clear to auscultation bilaterally [Normal Rate] : normal rate  [Regular Rhythm] : with a regular rhythm [Normal S1, S2] : normal S1 and S2 [No Murmur] : no murmur heard [No Carotid Bruits] : no carotid bruits [No Abdominal Bruit] : a ~M bruit was not heard ~T in the abdomen [No Varicosities] : no varicosities [No Edema] : there was no peripheral edema [No Palpable Aorta] : no palpable aorta [No Extremity Clubbing/Cyanosis] : no extremity clubbing/cyanosis [Soft] : abdomen soft [Non Tender] : non-tender [Non-distended] : non-distended [No Masses] : no abdominal mass palpated [No HSM] : no HSM [Normal Bowel Sounds] : normal bowel sounds [Normal Supraclavicular Nodes] : no supraclavicular lymphadenopathy [Normal Posterior Cervical Nodes] : no posterior cervical lymphadenopathy [Normal Anterior Cervical Nodes] : no anterior cervical lymphadenopathy [No Spinal Tenderness] : no spinal tenderness [No Joint Swelling] : no joint swelling [Grossly Normal Strength/Tone] : grossly normal strength/tone [No Rash] : no rash [Coordination Grossly Intact] : coordination grossly intact [No Focal Deficits] : no focal deficits [Normal Gait] : normal gait [Speech Grossly Normal] : speech grossly normal [Normal Affect] : the affect was normal [Alert and Oriented x3] : oriented to person, place, and time [Normal Mood] : the mood was normal [Normal Insight/Judgement] : insight and judgment were intact [Declined Rectal Exam] : declined rectal exam [de-identified] : mildly reduced pedal pulses b/l

## 2020-12-27 NOTE — ASSESSMENT
[FreeTextEntry1] : discussed w pt\par \par cont current rx \par \par reviewed recent GI workup w EGD, colonoscopy w Dr Vidales. esophageal ulcer noted \par \par doing well since lung cancer surgery, stopped smoking, regained weight. COPD is stable, following w pulm. \par \par  he had influenza vaccine this season\par \par f/u w Dr Sanchez urology for continued monitoring of PSA elevation in setting of BPH \par \par check routine labs as below \par \par continued completed smoking cessation is advised \par \par all other immunizations UTD \par \par RTO 6 months for f/u or earlier prn if any new concerns

## 2020-12-28 ENCOUNTER — APPOINTMENT (OUTPATIENT)
Dept: DERMATOLOGY | Facility: CLINIC | Age: 75
End: 2020-12-28
Payer: MEDICARE

## 2020-12-28 VITALS — HEIGHT: 64 IN | WEIGHT: 146 LBS | BODY MASS INDEX: 24.92 KG/M2

## 2020-12-28 PROCEDURE — 99214 OFFICE O/P EST MOD 30 MIN: CPT | Mod: 25

## 2020-12-28 PROCEDURE — 99072 ADDL SUPL MATRL&STAF TM PHE: CPT

## 2020-12-28 PROCEDURE — 17000 DESTRUCT PREMALG LESION: CPT

## 2021-01-08 ENCOUNTER — APPOINTMENT (OUTPATIENT)
Dept: DERMATOLOGY | Facility: CLINIC | Age: 76
End: 2021-01-08

## 2021-01-28 PROCEDURE — 88112 CYTOPATH CELL ENHANCE TECH: CPT | Mod: 26

## 2021-02-12 ENCOUNTER — APPOINTMENT (OUTPATIENT)
Dept: PULMONOLOGY | Facility: CLINIC | Age: 76
End: 2021-02-12
Payer: MEDICARE

## 2021-02-12 VITALS — SYSTOLIC BLOOD PRESSURE: 170 MMHG | DIASTOLIC BLOOD PRESSURE: 78 MMHG

## 2021-02-12 VITALS — HEART RATE: 70 BPM | TEMPERATURE: 97.5 F | OXYGEN SATURATION: 95 %

## 2021-02-12 PROCEDURE — 99212 OFFICE O/P EST SF 10 MIN: CPT

## 2021-02-12 PROCEDURE — 99072 ADDL SUPL MATRL&STAF TM PHE: CPT

## 2021-02-14 NOTE — HISTORY OF PRESENT ILLNESS
[Former] : former [Never] : never [TextBox_4] : Follow-up for COPD\par Lung cancer status post surgery\par Some shortness of breath\par Compliant with inhalers\par tried breztri but not covered

## 2021-02-15 LAB
ALP BLD-CCNC: 78 U/L
ANION GAP SERPL CALC-SCNC: 14 MMOL/L
APPEARANCE: CLEAR
BACTERIA UR CULT: NORMAL
BACTERIA: NEGATIVE
BILIRUBIN URINE: NEGATIVE
BLOOD URINE: NEGATIVE
BUN SERPL-MCNC: 21 MG/DL
CALCIUM SERPL-MCNC: 9.5 MG/DL
CHLORIDE SERPL-SCNC: 99 MMOL/L
CO2 SERPL-SCNC: 27 MMOL/L
COLOR: NORMAL
CREAT SERPL-MCNC: 1.2 MG/DL
GLUCOSE QUALITATIVE U: NEGATIVE
GLUCOSE SERPL-MCNC: 102 MG/DL
HYALINE CASTS: 0 /LPF
KETONES URINE: NEGATIVE
LEUKOCYTE ESTERASE URINE: NEGATIVE
MICROSCOPIC-UA: NORMAL
NITRITE URINE: NEGATIVE
PH URINE: 6
POTASSIUM SERPL-SCNC: 4.8 MMOL/L
PROTEIN URINE: NEGATIVE
PSA FREE FLD-MCNC: 22 %
PSA FREE SERPL-MCNC: 2.67 NG/ML
PSA SERPL-MCNC: 12.2 NG/ML
RED BLOOD CELLS URINE: 0 /HPF
SODIUM SERPL-SCNC: 139 MMOL/L
SPECIFIC GRAVITY URINE: 1.02
SQUAMOUS EPITHELIAL CELLS: 0 /HPF
TESTOST BND SERPL-MCNC: 11.9 NG/DL
TESTOSTERONE BIOAVAILABLE: 84 NG/DL
TESTOSTERONE TOTAL S: 496 NG/DL
URINE CYTOLOGY: NORMAL
UROBILINOGEN URINE: NORMAL
WHITE BLOOD CELLS URINE: 1 /HPF

## 2021-02-17 ENCOUNTER — APPOINTMENT (OUTPATIENT)
Dept: UROLOGY | Facility: CLINIC | Age: 76
End: 2021-02-17
Payer: MEDICARE

## 2021-02-17 VITALS
RESPIRATION RATE: 17 BRPM | WEIGHT: 146 LBS | HEIGHT: 64 IN | SYSTOLIC BLOOD PRESSURE: 178 MMHG | BODY MASS INDEX: 24.92 KG/M2 | TEMPERATURE: 97 F | HEART RATE: 67 BPM | DIASTOLIC BLOOD PRESSURE: 81 MMHG

## 2021-02-17 PROCEDURE — 99214 OFFICE O/P EST MOD 30 MIN: CPT

## 2021-02-17 PROCEDURE — 99072 ADDL SUPL MATRL&STAF TM PHE: CPT

## 2021-02-17 PROCEDURE — 88112 CYTOPATH CELL ENHANCE TECH: CPT | Mod: 26

## 2021-02-19 LAB
APPEARANCE: CLEAR
BACTERIA UR CULT: NORMAL
BACTERIA: NEGATIVE
BILIRUBIN URINE: NEGATIVE
BLOOD URINE: NEGATIVE
COLOR: YELLOW
GLUCOSE QUALITATIVE U: NEGATIVE
HYALINE CASTS: 0 /LPF
KETONES URINE: NEGATIVE
LEUKOCYTE ESTERASE URINE: NEGATIVE
MICROSCOPIC-UA: NORMAL
NITRITE URINE: NEGATIVE
PH URINE: 6.5
PROTEIN URINE: NEGATIVE
RED BLOOD CELLS URINE: 1 /HPF
SPECIFIC GRAVITY URINE: 1.02
SQUAMOUS EPITHELIAL CELLS: 0 /HPF
UROBILINOGEN URINE: NORMAL
WHITE BLOOD CELLS URINE: 0 /HPF

## 2021-02-20 NOTE — HISTORY OF PRESENT ILLNESS
[FreeTextEntry1] : Mr. Ramos is a 73 year old male presented with elevated PSA. PSA from 1/18/18 was 7.43 ng/mL and previously 8.11 ng/mL on 12/13/17. Patient had two prostate needle biopsies in the past and pathology on 3/9/10 showed benign prostatic tissue and focal mild chronic prostatitis. Patient has seen Dr. Manuel two years ago for erectile dysfunction and decreased libido.  Had tried Trimix injections and oral medications for ED in the past but notes they do not improved his erections. Patient's wife has lung cancer and breast cancer. Has Crohn's disease but is not currently active. Current smoker. \par 8/16/18: The patient returned and reported he finished his Bactrim course. PSA from 8/13/18 was 11.77 ng/mL and a percent free PSA of 16.1%. \par 9/5/18: The patient returned to discuss MRI results. MRI of the prostate from 8/18/18 findings showed no suspicious lesions for clinically significant prostate cancer. If total PSA today is over 15 ng/mL and percent free PSA below 14%, we will proceed with biopsy. \par 11/21/18: The patient returned and reported his urination is adequate. Patient denied dysuria, gross hematuria, urgency, or incontinence. Wakes up 1-2 times during the night to urinate. Complained of ED, has tried viagra and Trimix injections in the past. Is willing to reconsider trying 7 tablets of Sildenafil. PSA from 11/12/18 was 9.50 ng/mL, current PSA is within range of patient, will watch PSA. \par 4/15/19: The patient returned today and reported that his urination is satisfactory. Patient denies dysuria, gross hematuria, pushing or straining to urinate, urgency, or incontinence. Wakes up once during the night to urinate. Patient hasn't tried the Sildenafil that I prescribed during his last visit. 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 Today he states that his urination is satisfactory. Patient denies dysuria, gross hematuria, urgency or incontinence. He is feeling very good overall. He has a Hx of elevated PSA and his most recent PSA from 9/17/19 was 8.77 ng/mL. An MRI was completed last year and determined PIRADS 2. A biopsy was not necessary at that time. \par \par 03/11/2020: Patient presents today for a follow up. His PSA as of 3/9/2020 was 10.10 ng/mL which has elevated from prior PSA of 8.77 from 9/17/2019. His creatinine was 1.21mg/dL. The patient denies chest pains and constipation.\par \par 02/17/2021: Patient presents today for follow up. Feels well. Wakes 0-1x at night to urinate. Urinates 2-3x during the day. Denies urinary urgency, pushing or straining, gross hematuria, or dysuria. No constipation. No hx or FHx of kidney stones. No FHx of prostate CA. 7/16/20 MRI prostate revealed score of PIRADS 2. Reviewed recent 2/10/21 lab results. PSA 12.20. H/o Crohn's disease, not on any steroids. PSHx lung wedge resection. Former smoker of one pack a day for more than 30 years, and quit 4 years ago. Wife had lung and breast CA and is now in remission. Feels well today and offers no new complaints.

## 2021-02-20 NOTE — ADDENDUM
[FreeTextEntry1] : I, Julia Masonin, acted solely as a scribe for Dr. Cricket Sanchez on this date 02/17/2021.\par \par All medical record entries made by the Scribe were at my, Dr. Cricket Sanchez, direction and personally dictated by me on 02/17/2021. I have reviewed the chart and agree that the record accurately reflects my personal performance of the history, physical exam, assessment and plan.  I have also personally directed, reviewed and agreed with the chart.

## 2021-02-20 NOTE — PHYSICAL EXAM
[General Appearance - Well Developed] : well developed [Normal Appearance] : normal appearance [General Appearance - In No Acute Distress] : no acute distress [Abdomen Soft] : soft [Abdomen Tenderness] : non-tender [Edema] : no peripheral edema [] : no respiratory distress [Respiration, Rhythm And Depth] : normal respiratory rhythm and effort [Exaggerated Use Of Accessory Muscles For Inspiration] : no accessory muscle use [Oriented To Time, Place, And Person] : oriented to person, place, and time [Affect] : the affect was normal [Mood] : the mood was normal [Not Anxious] : not anxious [Normal Station and Gait] : the gait and station were normal for the patient's age [No Focal Deficits] : no focal deficits [FreeTextEntry1] : Knee chest position was used. Digital rectal exam found no suspicious rectal masses. No rectal mucosal lesions. Anal tone is normal. The prostate is non tender, with normal texture, discrete borders, and no nodules. It is a 30 gram transurethral resection size. No gross blood on examining fingers.

## 2021-02-20 NOTE — ASSESSMENT
[FreeTextEntry1] : Mr. Ramos is a 75 year old male presented with elevated PSA. PSA from 1/18/18 was 7.43 ng/mL and previously 8.11 ng/mL on 12/13/17. Patient had two prostate needle biopsies in the past and pathology on 3/9/10 showed benign prostatic tissue and focal mild chronic prostatitis. Patient has seen Dr. Manuel two years ago for erectile dysfunction and decreased libido.  Had tried Trimix injections and oral medications for ED in the past but notes they do not improved his erections. Patient's wife has lung cancer and breast cancer. Has Crohn's disease but is not currently active. Current smoker. \par 8/16/18: The patient returned and reported he finished his Bactrim course. PSA from 8/13/18 was 11.77 ng/mL and a percent free PSA of 16.1%. \par 9/5/18: The patient returned to discuss MRI results. MRI of the prostate from 8/18/18 findings showed no suspicious lesions for clinically significant prostate cancer. If total PSA today is over 15 ng/mL and percent free PSA below 14%, we will proceed with biopsy. \par 11/21/18: The patient returned and reported his urination is adequate. Patient denied dysuria, gross hematuria, urgency, or incontinence. Wakes up 1-2 times during the night to urinate. Complained of ED, has tried viagra and Trimix injections in the past. Is willing to reconsider trying 7 tablets of Sildenafil. PSA from 11/12/18 was 9.50 ng/mL, current PSA is within range of patient, will watch PSA. \par 4/15/19: The patient returned today and reported that his urination is satisfactory. Patient denies dysuria, gross hematuria, pushing or straining to urinate, urgency, or incontinence. Wakes up once during the night to urinate. Patient hasn't tried the Sildenafil that I prescribed during his last visit. 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 Today he states that his urination is satisfactory. Patient denies dysuria, gross hematuria, urgency or incontinence. He is feeling very good overall. He has a Hx of elevated PSA and his most recent PSA from 9/17/19 was 8.77 ng/mL. An MRI was completed last year and determined PIRADS 2. A biopsy was not necessary at that time. \par \par 03/11/2020: Patient presents today for a follow up. His PSA as of 3/9/2020 was 10.10 ng/mL which has elevated from prior PSA of 8.77 from 9/17/2019. His creatinine was 1.21 mg/dL. The patient denies chest pains and constipation.\par \par 02/17/2021: Patient presents today for follow up. Feels well. Wakes 0-1x at night to urinate. Urinates 2-3x during the day. Denies urinary urgency, pushing or straining, gross hematuria, or dysuria. No constipation. No hx or FHx of kidney stones. No FHx of prostate CA. 7/16/20 MRI prostate revealed score of PIRADS 2. Reviewed recent 2/10/21 lab results. PSA 12.20. H/o Crohn's disease, not on any steroids. PSHx lung wedge resection. Former smoker of one pack a day for more than 30 years, and quit 4 years ago. Wife had lung and breast CA and is now in remission. Feels well today and offers no new complaints. \par \par Knee chest position was used. Digital rectal exam found no suspicious rectal masses. No rectal mucosal lesions. Anal tone is normal. The prostate is non tender, with normal texture, discrete borders, and no nodules. It is a 30 gram transurethral resection size. No gross blood on examining fingers. \par \par 2/10/21 PSA is elevated at 12.20 which is typical for the patient. Has had two negative biopsies in the past and recent MRI was negative. \par \par The patient produced a urine sample which will be sent for urinalysis, urine cytology, and urine culture. \par \par RTO in 3 months for follow up. \par \par Preparation, in-person office visit, and coordination of care took: 30 minutes

## 2021-02-21 LAB — URINE CYTOLOGY: NORMAL

## 2021-02-28 ENCOUNTER — RX RENEWAL (OUTPATIENT)
Age: 76
End: 2021-02-28

## 2021-03-19 LAB
ALBUMIN SERPL ELPH-MCNC: 4.4 G/DL
ALP BLD-CCNC: 81 U/L
ALT SERPL-CCNC: 15 U/L
ANION GAP SERPL CALC-SCNC: 10 MMOL/L
APPEARANCE: CLEAR
AST SERPL-CCNC: 20 U/L
BASOPHILS # BLD AUTO: 0.04 K/UL
BASOPHILS NFR BLD AUTO: 0.5 %
BILIRUB SERPL-MCNC: 0.5 MG/DL
BILIRUBIN URINE: NEGATIVE
BLOOD URINE: NEGATIVE
BUN SERPL-MCNC: 19 MG/DL
CALCIUM SERPL-MCNC: 9.2 MG/DL
CHLORIDE SERPL-SCNC: 102 MMOL/L
CHOLEST SERPL-MCNC: 193 MG/DL
CO2 SERPL-SCNC: 29 MMOL/L
COLOR: COLORLESS
CREAT SERPL-MCNC: 1.31 MG/DL
EOSINOPHIL # BLD AUTO: 0.16 K/UL
EOSINOPHIL NFR BLD AUTO: 2.2 %
ESTIMATED AVERAGE GLUCOSE: 114 MG/DL
GLUCOSE QUALITATIVE U: NEGATIVE
GLUCOSE SERPL-MCNC: 98 MG/DL
HBA1C MFR BLD HPLC: 5.6 %
HCT VFR BLD CALC: 41.7 %
HDLC SERPL-MCNC: 90 MG/DL
HGB BLD-MCNC: 13.5 G/DL
IMM GRANULOCYTES NFR BLD AUTO: 0.1 %
KETONES URINE: NEGATIVE
LDLC SERPL CALC-MCNC: 88 MG/DL
LEUKOCYTE ESTERASE URINE: NEGATIVE
LYMPHOCYTES # BLD AUTO: 0.94 K/UL
LYMPHOCYTES NFR BLD AUTO: 12.8 %
MAN DIFF?: NORMAL
MCHC RBC-ENTMCNC: 32.4 GM/DL
MCHC RBC-ENTMCNC: 33 PG
MCV RBC AUTO: 102 FL
MONOCYTES # BLD AUTO: 0.73 K/UL
MONOCYTES NFR BLD AUTO: 9.9 %
NEUTROPHILS # BLD AUTO: 5.46 K/UL
NEUTROPHILS NFR BLD AUTO: 74.5 %
NITRITE URINE: NEGATIVE
NONHDLC SERPL-MCNC: 103 MG/DL
PH URINE: 6.5
PLATELET # BLD AUTO: 265 K/UL
POTASSIUM SERPL-SCNC: 4.6 MMOL/L
PROT SERPL-MCNC: 6.9 G/DL
PROTEIN URINE: NEGATIVE
PSA SERPL-MCNC: 11.5 NG/ML
RBC # BLD: 4.09 M/UL
RBC # FLD: 13.8 %
SARS-COV-2 IGG SERPL IA-ACNC: 0.07 INDEX
SARS-COV-2 IGG SERPL QL IA: NEGATIVE
SODIUM SERPL-SCNC: 140 MMOL/L
SPECIFIC GRAVITY URINE: 1
T4 FREE SERPL-MCNC: 1.3 NG/DL
TRIGL SERPL-MCNC: 73 MG/DL
TSH SERPL-ACNC: 1.47 UIU/ML
UROBILINOGEN URINE: NORMAL
WBC # FLD AUTO: 7.34 K/UL

## 2021-03-21 ENCOUNTER — APPOINTMENT (OUTPATIENT)
Dept: DISASTER EMERGENCY | Facility: CLINIC | Age: 76
End: 2021-03-21

## 2021-03-23 ENCOUNTER — TRANSCRIPTION ENCOUNTER (OUTPATIENT)
Age: 76
End: 2021-03-23

## 2021-03-24 ENCOUNTER — RESULT REVIEW (OUTPATIENT)
Age: 76
End: 2021-03-24

## 2021-03-24 ENCOUNTER — OUTPATIENT (OUTPATIENT)
Dept: OUTPATIENT SERVICES | Facility: HOSPITAL | Age: 76
LOS: 1 days | End: 2021-03-24
Payer: COMMERCIAL

## 2021-03-24 DIAGNOSIS — Z98.89 OTHER SPECIFIED POSTPROCEDURAL STATES: Chronic | ICD-10-CM

## 2021-03-24 DIAGNOSIS — K22.10 ULCER OF ESOPHAGUS WITHOUT BLEEDING: ICD-10-CM

## 2021-03-24 DIAGNOSIS — Z90.89 ACQUIRED ABSENCE OF OTHER ORGANS: Chronic | ICD-10-CM

## 2021-03-24 PROCEDURE — 88305 TISSUE EXAM BY PATHOLOGIST: CPT | Mod: 26

## 2021-03-24 PROCEDURE — 88313 SPECIAL STAINS GROUP 2: CPT | Mod: 26

## 2021-03-24 PROCEDURE — 88313 SPECIAL STAINS GROUP 2: CPT

## 2021-03-24 PROCEDURE — 88312 SPECIAL STAINS GROUP 1: CPT

## 2021-03-24 PROCEDURE — 88305 TISSUE EXAM BY PATHOLOGIST: CPT

## 2021-03-24 PROCEDURE — 43239 EGD BIOPSY SINGLE/MULTIPLE: CPT

## 2021-03-24 PROCEDURE — 88312 SPECIAL STAINS GROUP 1: CPT | Mod: 26

## 2021-03-25 LAB — SURGICAL PATHOLOGY STUDY: SIGNIFICANT CHANGE UP

## 2021-04-29 ENCOUNTER — NON-APPOINTMENT (OUTPATIENT)
Age: 76
End: 2021-04-29

## 2021-04-29 ENCOUNTER — APPOINTMENT (OUTPATIENT)
Dept: THORACIC SURGERY | Facility: CLINIC | Age: 76
End: 2021-04-29
Payer: MEDICARE

## 2021-04-29 VITALS
DIASTOLIC BLOOD PRESSURE: 91 MMHG | SYSTOLIC BLOOD PRESSURE: 183 MMHG | WEIGHT: 144 LBS | OXYGEN SATURATION: 94 % | HEART RATE: 67 BPM | TEMPERATURE: 97.9 F | HEIGHT: 64 IN | BODY MASS INDEX: 24.59 KG/M2

## 2021-04-29 DIAGNOSIS — Z87.891 PERSONAL HISTORY OF NICOTINE DEPENDENCE: ICD-10-CM

## 2021-04-29 DIAGNOSIS — E78.5 HYPERLIPIDEMIA, UNSPECIFIED: ICD-10-CM

## 2021-04-29 PROCEDURE — 99205 OFFICE O/P NEW HI 60 MIN: CPT

## 2021-04-29 PROCEDURE — 99072 ADDL SUPL MATRL&STAF TM PHE: CPT

## 2021-04-30 PROBLEM — Z87.891 RECENTLY QUIT USING TOBACCO: Status: ACTIVE | Noted: 2019-02-28

## 2021-04-30 RX ORDER — CHROMIUM 200 MCG
TABLET ORAL
Refills: 0 | Status: ACTIVE | COMMUNITY

## 2021-04-30 RX ORDER — LATANOPROST 50 UG/ML
0.01 SOLUTION OPHTHALMIC
Refills: 0 | Status: ACTIVE | COMMUNITY

## 2021-04-30 NOTE — PHYSICAL EXAM
[General Appearance - Alert] : alert [General Appearance - In No Acute Distress] : in no acute distress [Sclera] : the sclera and conjunctiva were normal [PERRL With Normal Accommodation] : pupils were equal in size, round, and reactive to light [Extraocular Movements] : extraocular movements were intact [Outer Ear] : the ears and nose were normal in appearance [Oropharynx] : the oropharynx was normal [Neck Appearance] : the appearance of the neck was normal [Neck Cervical Mass (___cm)] : no neck mass was observed [Jugular Venous Distention Increased] : there was no jugular-venous distention [Thyroid Diffuse Enlargement] : the thyroid was not enlarged [Thyroid Nodule] : there were no palpable thyroid nodules [Auscultation Breath Sounds / Voice Sounds] : lungs were clear to auscultation bilaterally [Heart Rate And Rhythm] : heart rate was normal and rhythm regular [Heart Sounds] : normal S1 and S2 [Heart Sounds Gallop] : no gallops [Murmurs] : no murmurs [Heart Sounds Pericardial Friction Rub] : no pericardial rub [Examination Of The Chest] : the chest was normal in appearance [Chest Visual Inspection Thoracic Asymmetry] : no chest asymmetry [Diminished Respiratory Excursion] : normal chest expansion [2+] : left 2+ [No Abnormalities] : the abdominal aorta was not enlarged and no bruit was heard [Breast Appearance] : normal in appearance [Breast Palpation Mass] : no palpable masses [Bowel Sounds] : normal bowel sounds [Abdomen Soft] : soft [Abdomen Tenderness] : non-tender [Abdomen Mass (___ Cm)] : no abdominal mass palpated [Cervical Lymph Nodes Enlarged Posterior Bilaterally] : posterior cervical [Supraclavicular Lymph Nodes Enlarged Bilaterally] : supraclavicular [Cervical Lymph Nodes Enlarged Anterior Bilaterally] : anterior cervical [No CVA Tenderness] : no ~M costovertebral angle tenderness [No Spinal Tenderness] : no spinal tenderness [Abnormal Walk] : normal gait [Nail Clubbing] : no clubbing  or cyanosis of the fingernails [Musculoskeletal - Swelling] : no joint swelling seen [Motor Tone] : muscle strength and tone were normal [Skin Color & Pigmentation] : normal skin color and pigmentation [Skin Turgor] : normal skin turgor [] : no rash [Deep Tendon Reflexes (DTR)] : deep tendon reflexes were 2+ and symmetric [Sensation] : the sensory exam was normal to light touch and pinprick [No Focal Deficits] : no focal deficits [Oriented To Time, Place, And Person] : oriented to person, place, and time [Impaired Insight] : insight and judgment were intact [Affect] : the affect was normal [Right Carotid Bruit] : no bruit heard over the right carotid [Left Carotid Bruit] : no bruit heard over the left carotid [Right Femoral Bruit] : no bruit heard over the right femoral artery [Left Femoral Bruit] : no bruit heard over the left femoral artery [FreeTextEntry1] : Deferred

## 2021-04-30 NOTE — ASSESSMENT
[FreeTextEntry1] : Mr. DEUCE SIDHU, 75 year old male, former smoker, w/ hx of HTN, HLD, COPD, Crohn's disease, Stg I T1aN0 Lung AdenoCA s/p JULIO segmentectomy on 2/4/19 by Dr. Billings at [facility], who referred by Dr. Timmy Vidales (GI) for hiatal hernia with chronic ulcerative esophagitis. \par \par CT Chest on 8/11/2020 showed calcified lung nodules; a stable 6mm LLL nodule (3:109); s/p LULobectomy; unremarkable abdomen.\par \par EGD on 08/19/2020. Path of EGJ bx revealed segment of inflammatory exudate. Cardia mucosa with mild to moderate chronic inflammation and vascular congestion. Path of esophagus bx revealed extensive ulcerative acute esophagitis with inflammatory exudate and regions of granulation tissue. Duodenal bx revealed moderate chronic active duodenitis. \par \par EGD on 11/11/2020. Path of duodenal bulb bx revealed duodenal mucosa with Brunner's gland hyperplasia. Peptic duodenitis. GEJ bx revealed squamocolumnar mucosa with acute and chronic inflammation. Esophageus bx revealed ulcerative esophagitis involving squamous epithelium. \par \par EGD on 3/24/21 by Dr. Timmy Vidales showed a 5 cm hiatal hernia; mild non-erosive gastritis; mild portal gastropathy. EG junction is irregular at 34cm w/ ulcerations extending to 32cm. Path showed chronic gastritis, negative H. Pylori; GE mucosa showed mild active chronic inflammation, suggestive of reflux disease; negative for intestinal metaplasia. Bx of esophageal ulcers at 32cm showed acute ulcerating to chronic inflammation, moderate to severe, w/ surface inflammatory exudate, no evidence of fungal organisms. Negative for HSV I/II and CMV.\par \par I have reviewed the patient's medical records and diagnostic images at time of this office consultation and have made the following recommendation:\par 1. EGD reviewed and explained to patient, patient's symptoms relief with medications, however, chronic/recurrence esophagitis while he is on PPIs. I recommended a EGD, Robotic-assisted, Laparoscopy, repair of hiatal hernia, Fundoplication. Risks and benefits and alternatives explained to patient, all questions answered, patient prefers to discuss with family and call back for his decision. \par 2. If patient prefers to proceed surgery, I recommend barium esophagram, manometry, medical clearance and PST prior to surgery. \par \par I personally performed the services described in the documentation, reviewed the documentation recorded by the scribe in my presence and it accurately and completely records my words and actions.\par \par I, Ilana Dolan NP, am scribing for and the presence of ASHLEY Verdugo, the following sections HISTORY OF PRESENT ILLNESS, PAST MEDICAL/FAMILY/SOCIAL HISTORY; REVIEW OF SYSTEMS; VITAL SIGNS; PHYSICAL EXAM; DISPOSITION.

## 2021-04-30 NOTE — DATA REVIEWED
[FreeTextEntry1] : I have independently reviewed the following:\par EGD on 3/24/21 by Dr. Timmy Vidales showed a 5cm hiatal hernia; mild non-erosive gastritis; mild portal gastropathy. EG junction is irregular at 34cm w/ ulcerations extending to 32cm. Path showed chronic gastritis, negative H. Pylori; GE mucosa showed mild active chronic inflammation, suggestive of reflux disease; negative for intestinal metaplasia. Bx of esophageal ulcers at 32cm showed acute ulcerating to chronic inflammation, moderate to severe, w/ surface inflammatory exudate, no evidence of fungal organisms. Negative for HSV I/II and CMV.

## 2021-04-30 NOTE — CONSULT LETTER
[Dear  ___] : Dear  [unfilled], [Consult Letter:] : I had the pleasure of evaluating your patient, [unfilled]. [( Thank you for referring [unfilled] for consultation for _____ )] : Thank you for referring [unfilled] for consultation for [unfilled] [Please see my note below.] : Please see my note below. [Consult Closing:] : Thank you very much for allowing me to participate in the care of this patient.  If you have any questions, please do not hesitate to contact me. [Sincerely,] : Sincerely, [FreeTextEntry2] : Dr. Timmy Vidales (GI/Referring)\par Dr. Volodymyr Pearce (Pulm)\par Dr. Dwayne Bob (PCP) [FreeTextEntry3] : Mervin Silverman MD, MPH \par System Director of Thoracic Surgery \par Director of Comprehensive Lung and Foregut [place] \par Professor Cardiovascular & Thoracic Surgery  \par [facility] [employer] [facility]\par \par [facility]\par [address]\par Oncology [address]\[place] [address]\par Tel: [phone]\par Fax: [phone]\par

## 2021-04-30 NOTE — HISTORY OF PRESENT ILLNESS
[FreeTextEntry1] : Mr. DEUCE SIDHU, 75 year old male, former smoker, w/ hx of HTN, HLD, COPD, Crohn's disease, Stg I T1aN0 Lung AdenoCA s/p JULIO segmentectomy on 2/4/19 by Dr. Billings at [facility], who referred by Dr. Timmy Vidales (GI) for hiatal hernia with chronic ulcerative esophagitis. \par \par CT Chest on 8/11/2020 showed calcified lung nodules; a stable 6mm LLL nodule (3:109); s/p LULobectomy; unremarkable abdomen.\par \par EGD on 08/19/2020. Path of EGJ bx revealed segment of inflammatory exudate. Cardia mucosa with mild to moderate chronic inflammation and vascular congestion. Path of esophagus bx revealed extensive ulcerative acute esophagitis with inflammatory exudate and regions of granulation tissue. Duodenal bx revealed moderate chronic active duodenitis. \par \par EGD on 11/11/2020. Path of duodenal bulb bx revealed duodenal mucosa with Brunner's gland hyperplasia. Peptic duodenitis. GEJ bx revealed squamocolumnar mucosa with acute and chronic inflammation. Esophageus bx revealed ulcerative esophagitis involving squamous epithelium. \par \par EGD on 3/24/21 by Dr. Timmy Vidales showed a 5 cm hiatal hernia; mild non-erosive gastritis; mild portal gastropathy. EG junction is irregular at 34cm w/ ulcerations extending to 32cm. Path showed chronic gastritis, negative H. Pylori; GE mucosa showed mild active chronic inflammation, suggestive of reflux disease; negative for intestinal metaplasia. Bx of esophageal ulcers at 32cm showed acute ulcerating to chronic inflammation, moderate to severe, w/ surface inflammatory exudate, no evidence of fungal organisms. Negative for HSV I/II and CMV.\par \par Patient is here today for CT Sx consultation. Patient c/o epigastric pain occasionally, He is taking Pantoprazole and Sucralfate with good relief, however patient is not compliant with medications. Patient denies dysphagia or acid reflux. Patient denies shortness of breath, cough, chest pain, fever, chills, loss of appetite, weight loss, or hemoptysis.

## 2021-05-07 ENCOUNTER — APPOINTMENT (OUTPATIENT)
Dept: PULMONOLOGY | Facility: CLINIC | Age: 76
End: 2021-05-07
Payer: MEDICARE

## 2021-05-07 VITALS
HEART RATE: 65 BPM | TEMPERATURE: 97.6 F | OXYGEN SATURATION: 95 % | SYSTOLIC BLOOD PRESSURE: 163 MMHG | DIASTOLIC BLOOD PRESSURE: 72 MMHG

## 2021-05-07 DIAGNOSIS — J43.9 EMPHYSEMA, UNSPECIFIED: ICD-10-CM

## 2021-05-07 PROCEDURE — 99072 ADDL SUPL MATRL&STAF TM PHE: CPT

## 2021-05-07 PROCEDURE — 99214 OFFICE O/P EST MOD 30 MIN: CPT

## 2021-05-08 PROBLEM — J43.9 EMPHYSEMA/COPD: Status: ACTIVE | Noted: 2019-02-28

## 2021-05-08 NOTE — ASSESSMENT
[FreeTextEntry1] : Based on history and physical the patient has a high likelihood of having obstructive sleep apnea. Further assessment by sleep testing is recommended. There is no contraindication to a home sleep study. We will therefore proceed to two night home apnea sleep study for further assessment.\par Will discuss upcoming hiatal hernia surgery with Dr. Silverman

## 2021-05-08 NOTE — HISTORY OF PRESENT ILLNESS
[TextBox_4] : Patient planning to have hiatal hernia surgery.  Scheduled with Dr. Mervin Silverman.\par Having severe reflux issues\par Also having issues with daytime sleepiness and nonrestorative sleep.\par \par \par

## 2021-05-09 ENCOUNTER — TRANSCRIPTION ENCOUNTER (OUTPATIENT)
Age: 76
End: 2021-05-09

## 2021-05-09 LAB
COVID-19 SPIKE DOMAIN ANTIBODY INTERPRETATION: POSITIVE
SARS-COV-2 AB SERPL IA-ACNC: 148 U/ML

## 2021-06-17 ENCOUNTER — NON-APPOINTMENT (OUTPATIENT)
Age: 76
End: 2021-06-17

## 2021-06-22 ENCOUNTER — OUTPATIENT (OUTPATIENT)
Dept: OUTPATIENT SERVICES | Facility: HOSPITAL | Age: 76
LOS: 1 days | End: 2021-06-22
Payer: MEDICARE

## 2021-06-22 VITALS
WEIGHT: 145.95 LBS | DIASTOLIC BLOOD PRESSURE: 70 MMHG | RESPIRATION RATE: 14 BRPM | SYSTOLIC BLOOD PRESSURE: 144 MMHG | OXYGEN SATURATION: 96 % | TEMPERATURE: 97 F | HEIGHT: 63 IN | HEART RATE: 59 BPM

## 2021-06-22 DIAGNOSIS — K44.9 DIAPHRAGMATIC HERNIA WITHOUT OBSTRUCTION OR GANGRENE: ICD-10-CM

## 2021-06-22 DIAGNOSIS — Z98.890 OTHER SPECIFIED POSTPROCEDURAL STATES: Chronic | ICD-10-CM

## 2021-06-22 DIAGNOSIS — Z98.89 OTHER SPECIFIED POSTPROCEDURAL STATES: Chronic | ICD-10-CM

## 2021-06-22 DIAGNOSIS — Z90.89 ACQUIRED ABSENCE OF OTHER ORGANS: Chronic | ICD-10-CM

## 2021-06-22 LAB
ALBUMIN SERPL ELPH-MCNC: 4.3 G/DL — SIGNIFICANT CHANGE UP (ref 3.3–5)
ALP SERPL-CCNC: 73 U/L — SIGNIFICANT CHANGE UP (ref 40–120)
ALT FLD-CCNC: 13 U/L — SIGNIFICANT CHANGE UP (ref 4–41)
ANION GAP SERPL CALC-SCNC: 13 MMOL/L — SIGNIFICANT CHANGE UP (ref 7–14)
AST SERPL-CCNC: 19 U/L — SIGNIFICANT CHANGE UP (ref 4–40)
BILIRUB SERPL-MCNC: 0.3 MG/DL — SIGNIFICANT CHANGE UP (ref 0.2–1.2)
BLD GP AB SCN SERPL QL: NEGATIVE — SIGNIFICANT CHANGE UP
BUN SERPL-MCNC: 20 MG/DL — SIGNIFICANT CHANGE UP (ref 7–23)
CALCIUM SERPL-MCNC: 9.3 MG/DL — SIGNIFICANT CHANGE UP (ref 8.4–10.5)
CHLORIDE SERPL-SCNC: 101 MMOL/L — SIGNIFICANT CHANGE UP (ref 98–107)
CO2 SERPL-SCNC: 27 MMOL/L — SIGNIFICANT CHANGE UP (ref 22–31)
CREAT SERPL-MCNC: 1.17 MG/DL — SIGNIFICANT CHANGE UP (ref 0.5–1.3)
GLUCOSE SERPL-MCNC: 90 MG/DL — SIGNIFICANT CHANGE UP (ref 70–99)
HCT VFR BLD CALC: 40 % — SIGNIFICANT CHANGE UP (ref 39–50)
HGB BLD-MCNC: 12.5 G/DL — LOW (ref 13–17)
MCHC RBC-ENTMCNC: 29.6 PG — SIGNIFICANT CHANGE UP (ref 27–34)
MCHC RBC-ENTMCNC: 31.3 GM/DL — LOW (ref 32–36)
MCV RBC AUTO: 94.6 FL — SIGNIFICANT CHANGE UP (ref 80–100)
NRBC # BLD: 0 /100 WBCS — SIGNIFICANT CHANGE UP
NRBC # FLD: 0 K/UL — SIGNIFICANT CHANGE UP
PLATELET # BLD AUTO: 291 K/UL — SIGNIFICANT CHANGE UP (ref 150–400)
POTASSIUM SERPL-MCNC: 4.5 MMOL/L — SIGNIFICANT CHANGE UP (ref 3.5–5.3)
POTASSIUM SERPL-SCNC: 4.5 MMOL/L — SIGNIFICANT CHANGE UP (ref 3.5–5.3)
PROT SERPL-MCNC: 7.3 G/DL — SIGNIFICANT CHANGE UP (ref 6–8.3)
RBC # BLD: 4.23 M/UL — SIGNIFICANT CHANGE UP (ref 4.2–5.8)
RBC # FLD: 15.5 % — HIGH (ref 10.3–14.5)
RH IG SCN BLD-IMP: NEGATIVE — SIGNIFICANT CHANGE UP
SODIUM SERPL-SCNC: 141 MMOL/L — SIGNIFICANT CHANGE UP (ref 135–145)
WBC # BLD: 6.01 K/UL — SIGNIFICANT CHANGE UP (ref 3.8–10.5)
WBC # FLD AUTO: 6.01 K/UL — SIGNIFICANT CHANGE UP (ref 3.8–10.5)

## 2021-06-22 PROCEDURE — 93010 ELECTROCARDIOGRAM REPORT: CPT

## 2021-06-22 RX ORDER — CHOLECALCIFEROL (VITAMIN D3) 125 MCG
0 CAPSULE ORAL
Qty: 0 | Refills: 0 | DISCHARGE

## 2021-06-22 RX ORDER — SODIUM CHLORIDE 9 MG/ML
1000 INJECTION, SOLUTION INTRAVENOUS
Refills: 0 | Status: DISCONTINUED | OUTPATIENT
Start: 2021-07-12 | End: 2021-07-13

## 2021-06-22 NOTE — H&P PST ADULT - NSICDXPROBLEM_GEN_ALL_CORE_FT
PROBLEM DIAGNOSES  Problem: Hiatal hernia  Assessment and Plan: Pt scheduled for esophagogastroduodenoscopy, robotic assisted laparoscopic repair of hiatal hernia fundoplication on 7/12/2021.  labs done results pending, ekg done.  Hibiclens provided-  written and verbal instructions given with teach back, pt able to verbalize understanidng.  Preop teaching done, pt able to verbalize understanding.  Pt scheduled for medical eval as per surgeon, pst will also requesting due to multiple comorbidities.   OR booking notified of HUSSAIN precautions.   meds day of procedure-  amlodipine- valsartan, symbicort, spiriva, pantoprazole

## 2021-06-22 NOTE — H&P PST ADULT - CONSTITUTIONAL DETAILS
well-groomed Attending note (Gian): patient feeling much better, no significant/actionable lab abnormalities.  No abnormalities on fetal tracing per obgyn.  Stable for dc. well-groomed/no distress

## 2021-06-22 NOTE — H&P PST ADULT - HISTORY OF PRESENT ILLNESS
76y/o male scheduled for esophagogastroduodenoscopy, robotic assisted laparoscopic repair of hiatal hernia fundoplication on 7/12/2021.  Pt states, "hx htn, hld, copd, crohn's disease, lung cancer s/p JULIO segmentectomy 2/2019 denies chemo and radiation.  F/u EGD show hiatal hernia increasing size, c/o gerd with laying down, does not happen frequently."

## 2021-06-22 NOTE — H&P PST ADULT - DOES PATIENT HAVE ADVANCE DIRECTIVE
Activities of daily living, including home environment that might     exacerbate pain or reduce effectiveness of the pain management plan of care as well as strategies to address these issues/Education provided on the pain management plan of care/Side effects of pain management treatment/Safe use, storage and disposal of opioids when prescribed No

## 2021-06-22 NOTE — H&P PST ADULT - NSANTHOSAYNRD_GEN_A_CORE
No. HUSSAIN screening performed.  STOP BANG Legend: 0-2 = LOW Risk; 3-4 = INTERMEDIATE Risk; 5-8 = HIGH Risk

## 2021-06-22 NOTE — H&P PST ADULT - NSICDXPASTSURGICALHX_GEN_ALL_CORE_FT
PAST SURGICAL HISTORY:  H/O endoscopy     H/O left inguinal hernia repair     H/O prostate biopsy     History of colonoscopy     S/P tonsillectomy      PAST SURGICAL HISTORY:  H/O prostate biopsy 2008, 2010    History of lung biopsy 2019    S/P inguinal hernia repair left 2002, right 2016    S/P tonsillectomy 5/yo    Status post thoracotomy JULIO segmentectomy 2/4/2019

## 2021-06-22 NOTE — H&P PST ADULT - RS GEN PE MLT RESP DETAILS PC
normal/airway patent/breath sounds equal/good air movement/respirations non-labored/clear to auscultation bilaterally/wheezes breath sounds equal/good air movement/respirations non-labored/clear to auscultation bilaterally/no chest wall tenderness/no intercostal retractions/no rales/no rhonchi/no wheezes

## 2021-06-22 NOTE — H&P PST ADULT - NSICDXPASTMEDICALHX_GEN_ALL_CORE_FT
PAST MEDICAL HISTORY:  BPH (benign prostatic hypertrophy)     Crohn's disease     Elevated cholesterol     Glaucoma     Hypertension     Lung nodule being watched     PAST MEDICAL HISTORY:  BPH (benign prostatic hypertrophy)     Crohn's disease     Gastritis     Glaucoma     Hiatal hernia     Hyperlipidemia     Hypertension     Malignant neoplasm of lung Stg 1 T1aNO lung adeno ca 2/2019, denies chemo and radiation

## 2021-06-22 NOTE — H&P PST ADULT - GASTROINTESTINAL DETAILS
soft/nontender soft/nontender/no distention/no masses palpable/bowel sounds normal/no bruit/no rebound tenderness/no guarding/no rigidity/no organomegaly

## 2021-06-23 ENCOUNTER — OUTPATIENT (OUTPATIENT)
Dept: OUTPATIENT SERVICES | Facility: HOSPITAL | Age: 76
LOS: 1 days | Discharge: HOME | End: 2021-06-23
Payer: MEDICARE

## 2021-06-23 ENCOUNTER — APPOINTMENT (OUTPATIENT)
Dept: GASTROENTEROLOGY | Facility: HOSPITAL | Age: 76
End: 2021-06-23

## 2021-06-23 VITALS
WEIGHT: 145.95 LBS | RESPIRATION RATE: 17 BRPM | OXYGEN SATURATION: 99 % | TEMPERATURE: 98 F | DIASTOLIC BLOOD PRESSURE: 74 MMHG | HEIGHT: 60 IN | SYSTOLIC BLOOD PRESSURE: 157 MMHG | HEART RATE: 66 BPM

## 2021-06-23 DIAGNOSIS — Z98.890 OTHER SPECIFIED POSTPROCEDURAL STATES: Chronic | ICD-10-CM

## 2021-06-23 DIAGNOSIS — K21.9 GASTRO-ESOPHAGEAL REFLUX DISEASE WITHOUT ESOPHAGITIS: ICD-10-CM

## 2021-06-23 DIAGNOSIS — Z90.89 ACQUIRED ABSENCE OF OTHER ORGANS: Chronic | ICD-10-CM

## 2021-06-23 PROCEDURE — 91010 ESOPHAGUS MOTILITY STUDY: CPT | Mod: 26

## 2021-06-23 PROCEDURE — 91037 ESOPH IMPED FUNCTION TEST: CPT | Mod: 26

## 2021-06-23 NOTE — ASU PATIENT PROFILE, ADULT - PSH
H/O prostate biopsy  2008, 2010  History of lung biopsy  2019  S/P inguinal hernia repair  left 2002, right 2016  S/P tonsillectomy  5/yo  Status post thoracotomy  JULIO segmentectomy 2/4/2019

## 2021-06-23 NOTE — ASU PATIENT PROFILE, ADULT - PMH
BPH (benign prostatic hypertrophy)    Crohn's disease    Gastritis    Glaucoma    Hiatal hernia    Hyperlipidemia    Hypertension    Malignant neoplasm of lung  Stg 1 T1aNO lung adeno ca 2/2019, denies chemo and radiation

## 2021-06-25 PROBLEM — C34.90 MALIGNANT NEOPLASM OF UNSPECIFIED PART OF UNSPECIFIED BRONCHUS OR LUNG: Chronic | Status: ACTIVE | Noted: 2021-06-22

## 2021-06-25 PROBLEM — E78.5 HYPERLIPIDEMIA, UNSPECIFIED: Chronic | Status: ACTIVE | Noted: 2021-06-22

## 2021-06-25 PROBLEM — K44.9 DIAPHRAGMATIC HERNIA WITHOUT OBSTRUCTION OR GANGRENE: Chronic | Status: ACTIVE | Noted: 2021-06-22

## 2021-06-25 PROBLEM — K29.70 GASTRITIS, UNSPECIFIED, WITHOUT BLEEDING: Chronic | Status: ACTIVE | Noted: 2021-06-22

## 2021-06-27 NOTE — CURRENT MEDS
[Takes medication as prescribed] : takes
Airway patent, Nasal mucosa clear. Mouth with normal mucosa. Throat has no vesicles, no oropharyngeal exudates and uvula is midline.

## 2021-06-28 ENCOUNTER — APPOINTMENT (OUTPATIENT)
Dept: INTERNAL MEDICINE | Facility: CLINIC | Age: 76
End: 2021-06-28
Payer: MEDICARE

## 2021-06-28 VITALS
BODY MASS INDEX: 24.92 KG/M2 | OXYGEN SATURATION: 97 % | WEIGHT: 146 LBS | HEART RATE: 69 BPM | HEIGHT: 64 IN | DIASTOLIC BLOOD PRESSURE: 75 MMHG | SYSTOLIC BLOOD PRESSURE: 140 MMHG | TEMPERATURE: 97.6 F

## 2021-06-28 DIAGNOSIS — Z87.891 PERSONAL HISTORY OF NICOTINE DEPENDENCE: ICD-10-CM

## 2021-06-28 PROCEDURE — 99214 OFFICE O/P EST MOD 30 MIN: CPT

## 2021-07-06 PROBLEM — Z87.891 FORMER SMOKER: Status: ACTIVE | Noted: 2021-07-06

## 2021-07-06 NOTE — HISTORY OF PRESENT ILLNESS
[No Pertinent Cardiac History] : no history of aortic stenosis, atrial fibrillation, coronary artery disease, recent myocardial infarction, or implantable device/pacemaker [COPD] : COPD [No Adverse Anesthesia Reaction] : no adverse anesthesia reaction in self or family member [(Patient denies any chest pain, claudication, dyspnea on exertion, orthopnea, palpitations or syncope)] : Patient denies any chest pain, claudication, dyspnea on exertion, orthopnea, palpitations or syncope [Good (7-10 METs)] : Good (7-10 METs) [Spouse] : spouse [Chronic Anticoagulation] : no chronic anticoagulation [Diabetes] : no diabetes [FreeTextEntry1] : - repair of hiatal hernia  [FreeTextEntry2] : 7/12/21 [FreeTextEntry3] : - Dr Mervin MELVIN  [FreeTextEntry4] : \par presents for medical evaluation prior to planned repair of hiatal hernia. he feels well overall currently, no new concerns. chronic medical conditions are stable. he has completed both doses of COVID vaccine. \par \par COPD stable, following with Dr Pearce pulmonary, mild HUSSAIN \par hx of lung adenocarcinoma s/p resection, has remained abstinent from smoking since surgery \par HTN controlled on rx \par Crohns disease stable on rx, following with GI regularly \par

## 2021-07-06 NOTE — ASSESSMENT
[Patient Optimized for Surgery] : Patient optimized for surgery [As per surgery] : as per surgery [No Further Testing Recommended] : no further testing recommended [Continue medications as is] : Continue current medications

## 2021-07-06 NOTE — REVIEW OF SYSTEMS
[Heartburn] : heartburn [Nocturia] : nocturia [Negative] : Heme/Lymph [Nausea] : no nausea [Vomiting] : no vomiting [Dysuria] : no dysuria [Hematuria] : no hematuria

## 2021-07-06 NOTE — PLAN
[FreeTextEntry1] : discussed w pt \par \par reviewed preop testing in detail with pt. no medical contraindications to the planned surgery as scheduled. \par he should continue current rx until day of surgery. take antihypertensive on morning of surgery with sip of water. \par \par avoid NSAIDs one week prior to surgery \par \par please call with any questions \par \par continue followup with pulmonary and GI as scheduled \par \par please call with any questions \par \par RTO 3 months for f/u or earlier prn if any new concerns

## 2021-07-06 NOTE — PHYSICAL EXAM
[No Acute Distress] : no acute distress [Well-Appearing] : well-appearing [Normal Voice/Communication] : normal voice/communication [No Lymphadenopathy] : no lymphadenopathy [Normal] : normal rate, regular rhythm, normal S1 and S2 and no murmur heard [No Carotid Bruits] : no carotid bruits [No Edema] : there was no peripheral edema [Coordination Grossly Intact] : coordination grossly intact [No Focal Deficits] : no focal deficits [Normal Gait] : normal gait [Speech Grossly Normal] : speech grossly normal [Alert and Oriented x3] : oriented to person, place, and time [Normal Mood] : the mood was normal [Normal Insight/Judgement] : insight and judgment were intact

## 2021-07-08 ENCOUNTER — APPOINTMENT (OUTPATIENT)
Dept: RADIOLOGY | Facility: HOSPITAL | Age: 76
End: 2021-07-08
Payer: MEDICARE

## 2021-07-08 ENCOUNTER — RESULT REVIEW (OUTPATIENT)
Age: 76
End: 2021-07-08

## 2021-07-08 ENCOUNTER — OUTPATIENT (OUTPATIENT)
Dept: OUTPATIENT SERVICES | Facility: HOSPITAL | Age: 76
LOS: 1 days | End: 2021-07-08

## 2021-07-08 DIAGNOSIS — Z98.890 OTHER SPECIFIED POSTPROCEDURAL STATES: Chronic | ICD-10-CM

## 2021-07-08 DIAGNOSIS — K44.9 DIAPHRAGMATIC HERNIA WITHOUT OBSTRUCTION OR GANGRENE: ICD-10-CM

## 2021-07-08 DIAGNOSIS — Z90.89 ACQUIRED ABSENCE OF OTHER ORGANS: Chronic | ICD-10-CM

## 2021-07-08 PROCEDURE — 74220 X-RAY XM ESOPHAGUS 1CNTRST: CPT | Mod: 26

## 2021-07-12 ENCOUNTER — INPATIENT (INPATIENT)
Facility: HOSPITAL | Age: 76
LOS: 0 days | Discharge: HOME | End: 2021-07-13
Attending: THORACIC SURGERY (CARDIOTHORACIC VASCULAR SURGERY) | Admitting: THORACIC SURGERY (CARDIOTHORACIC VASCULAR SURGERY)
Payer: MEDICARE

## 2021-07-12 ENCOUNTER — APPOINTMENT (OUTPATIENT)
Dept: THORACIC SURGERY | Facility: HOSPITAL | Age: 76
End: 2021-07-12

## 2021-07-12 ENCOUNTER — TRANSCRIPTION ENCOUNTER (OUTPATIENT)
Age: 76
End: 2021-07-12

## 2021-07-12 ENCOUNTER — RESULT REVIEW (OUTPATIENT)
Age: 76
End: 2021-07-12

## 2021-07-12 VITALS
TEMPERATURE: 98 F | WEIGHT: 145.95 LBS | HEIGHT: 63 IN | OXYGEN SATURATION: 95 % | DIASTOLIC BLOOD PRESSURE: 87 MMHG | HEART RATE: 69 BPM | RESPIRATION RATE: 16 BRPM | SYSTOLIC BLOOD PRESSURE: 170 MMHG

## 2021-07-12 DIAGNOSIS — Z98.890 OTHER SPECIFIED POSTPROCEDURAL STATES: Chronic | ICD-10-CM

## 2021-07-12 DIAGNOSIS — Z90.89 ACQUIRED ABSENCE OF OTHER ORGANS: Chronic | ICD-10-CM

## 2021-07-12 DIAGNOSIS — K44.9 DIAPHRAGMATIC HERNIA WITHOUT OBSTRUCTION OR GANGRENE: ICD-10-CM

## 2021-07-12 PROCEDURE — S2900 ROBOTIC SURGICAL SYSTEM: CPT | Mod: NC

## 2021-07-12 PROCEDURE — 71045 X-RAY EXAM CHEST 1 VIEW: CPT | Mod: 26,76

## 2021-07-12 PROCEDURE — 88302 TISSUE EXAM BY PATHOLOGIST: CPT | Mod: 26

## 2021-07-12 PROCEDURE — 49585: CPT

## 2021-07-12 PROCEDURE — 43235 EGD DIAGNOSTIC BRUSH WASH: CPT | Mod: 59

## 2021-07-12 PROCEDURE — 88305 TISSUE EXAM BY PATHOLOGIST: CPT | Mod: 26

## 2021-07-12 PROCEDURE — 49585: CPT | Mod: AS

## 2021-07-12 PROCEDURE — 43281 LAP PARAESOPHAG HERN REPAIR: CPT

## 2021-07-12 PROCEDURE — 43281 LAP PARAESOPHAG HERN REPAIR: CPT | Mod: AS

## 2021-07-12 RX ORDER — TIOTROPIUM BROMIDE 18 UG/1
1 CAPSULE ORAL; RESPIRATORY (INHALATION) DAILY
Refills: 0 | Status: DISCONTINUED | OUTPATIENT
Start: 2021-07-12 | End: 2021-07-13

## 2021-07-12 RX ORDER — BUDESONIDE AND FORMOTEROL FUMARATE DIHYDRATE 160; 4.5 UG/1; UG/1
2 AEROSOL RESPIRATORY (INHALATION)
Refills: 0 | Status: DISCONTINUED | OUTPATIENT
Start: 2021-07-12 | End: 2021-07-13

## 2021-07-12 RX ORDER — HEPARIN SODIUM 5000 [USP'U]/ML
5000 INJECTION INTRAVENOUS; SUBCUTANEOUS EVERY 8 HOURS
Refills: 0 | Status: DISCONTINUED | OUTPATIENT
Start: 2021-07-12 | End: 2021-07-13

## 2021-07-12 RX ORDER — HEPARIN SODIUM 5000 [USP'U]/ML
5000 INJECTION INTRAVENOUS; SUBCUTANEOUS ONCE
Refills: 0 | Status: COMPLETED | OUTPATIENT
Start: 2021-07-12 | End: 2021-07-12

## 2021-07-12 RX ORDER — LATANOPROST 0.05 MG/ML
2 SOLUTION/ DROPS OPHTHALMIC; TOPICAL AT BEDTIME
Refills: 0 | Status: DISCONTINUED | OUTPATIENT
Start: 2021-07-12 | End: 2021-07-13

## 2021-07-12 RX ORDER — HYDROMORPHONE HYDROCHLORIDE 2 MG/ML
0.5 INJECTION INTRAMUSCULAR; INTRAVENOUS; SUBCUTANEOUS
Refills: 0 | Status: DISCONTINUED | OUTPATIENT
Start: 2021-07-12 | End: 2021-07-13

## 2021-07-12 RX ORDER — HYDRALAZINE HCL 50 MG
10 TABLET ORAL ONCE
Refills: 0 | Status: COMPLETED | OUTPATIENT
Start: 2021-07-12 | End: 2021-07-13

## 2021-07-12 RX ORDER — DIPHENHYDRAMINE HCL 50 MG
12.5 CAPSULE ORAL EVERY 4 HOURS
Refills: 0 | Status: DISCONTINUED | OUTPATIENT
Start: 2021-07-12 | End: 2021-07-13

## 2021-07-12 RX ORDER — NALOXONE HYDROCHLORIDE 4 MG/.1ML
0.1 SPRAY NASAL
Refills: 0 | Status: DISCONTINUED | OUTPATIENT
Start: 2021-07-12 | End: 2021-07-13

## 2021-07-12 RX ORDER — HYDROMORPHONE HYDROCHLORIDE 2 MG/ML
30 INJECTION INTRAMUSCULAR; INTRAVENOUS; SUBCUTANEOUS
Refills: 0 | Status: DISCONTINUED | OUTPATIENT
Start: 2021-07-12 | End: 2021-07-13

## 2021-07-12 RX ORDER — ONDANSETRON 8 MG/1
4 TABLET, FILM COATED ORAL EVERY 6 HOURS
Refills: 0 | Status: DISCONTINUED | OUTPATIENT
Start: 2021-07-12 | End: 2021-07-13

## 2021-07-12 RX ADMIN — HYDROMORPHONE HYDROCHLORIDE 30 MILLILITER(S): 2 INJECTION INTRAMUSCULAR; INTRAVENOUS; SUBCUTANEOUS at 14:59

## 2021-07-12 RX ADMIN — HYDROMORPHONE HYDROCHLORIDE 30 MILLILITER(S): 2 INJECTION INTRAMUSCULAR; INTRAVENOUS; SUBCUTANEOUS at 12:54

## 2021-07-12 RX ADMIN — HEPARIN SODIUM 5000 UNIT(S): 5000 INJECTION INTRAVENOUS; SUBCUTANEOUS at 06:57

## 2021-07-12 RX ADMIN — SODIUM CHLORIDE 75 MILLILITER(S): 9 INJECTION, SOLUTION INTRAVENOUS at 21:40

## 2021-07-12 RX ADMIN — LATANOPROST 2 DROP(S): 0.05 SOLUTION/ DROPS OPHTHALMIC; TOPICAL at 21:37

## 2021-07-12 RX ADMIN — HEPARIN SODIUM 5000 UNIT(S): 5000 INJECTION INTRAVENOUS; SUBCUTANEOUS at 21:40

## 2021-07-12 RX ADMIN — HYDROMORPHONE HYDROCHLORIDE 30 MILLILITER(S): 2 INJECTION INTRAMUSCULAR; INTRAVENOUS; SUBCUTANEOUS at 19:31

## 2021-07-12 RX ADMIN — SODIUM CHLORIDE 75 MILLILITER(S): 9 INJECTION, SOLUTION INTRAVENOUS at 14:59

## 2021-07-12 RX ADMIN — HEPARIN SODIUM 5000 UNIT(S): 5000 INJECTION INTRAVENOUS; SUBCUTANEOUS at 15:00

## 2021-07-12 RX ADMIN — SODIUM CHLORIDE 30 MILLILITER(S): 9 INJECTION, SOLUTION INTRAVENOUS at 06:58

## 2021-07-12 RX ADMIN — BUDESONIDE AND FORMOTEROL FUMARATE DIHYDRATE 2 PUFF(S): 160; 4.5 AEROSOL RESPIRATORY (INHALATION) at 21:37

## 2021-07-12 NOTE — DISCHARGE NOTE PROVIDER - NSDCCPTREATMENT_GEN_ALL_CORE_FT
PRINCIPAL PROCEDURE  Procedure: Repair of hiatal hernia with fundoplication  Findings and Treatment:       SECONDARY PROCEDURE  Procedure: Robot-assisted Toupet fundoplication  Findings and Treatment:

## 2021-07-12 NOTE — DISCHARGE NOTE PROVIDER - CARE PROVIDER_API CALL
Mervin Silverman (MD)  Surgery; Thoracic Surgery  [phone] [place] [address]  Phone: [phone]  Fax: [phone]  Established Patient  Follow Up Time: 2 weeks

## 2021-07-12 NOTE — DISCHARGE NOTE PROVIDER - INSTRUCTIONS
AS instructed Clear liquid diet for today and tomorrow. Then you may begin a Full liquid diet for 3 days. After that, begin a soft diet and stay on soft diet until you see Dr. Silverman in the office. No carbonated beverages. Avoid spicy or gassy foods. No large amounts at one time, have frequent small meals. Stay upright for at least 1 hour after each time you eat. Sleep with your head of bed at least at a 45deg angle.

## 2021-07-12 NOTE — DISCHARGE NOTE PROVIDER - HOSPITAL COURSE
This 74 y/o male underwent an esophagogastroduodenoscopy, robotic assisted laparoscopic repair of hiatal hernia, and Toupet fundoplication on 7/12/2021 for a hiatal hernia that had been increasing in size and associated with GERD symptoms.  The pt tolerated the procedure well, tolerated his diet afterward and was discharged home after passign his Barium swallow the next day. s 74 y/o male underwent an esophagogastroduodenoscopy, robotic assisted laparoscopic repair of hiatal hernia, and Toupet fundoplication on 7/12/2021 for a hiatal hernia that had been increasing in size and associated with GERD symptoms.  The pt tolerated the procedure well. Was on sips of fluids. No N/v or abd pain. Pt had barium swallow today. Some sml delay but contrast passed easily. No leak.   Pt was started on clear liquid diet and tolerated that well. Amb ad amarjit. No CP or SOB. Abd lap sites cd//i. Cleared for d/c to home by Dr. Silverman.   Vital Signs Last 24 Hrs  T(C): 36.5 (13 Jul 2021 12:19), Max: 37.1 (12 Jul 2021 20:24)  T(F): 97.7 (13 Jul 2021 12:19), Max: 98.7 (12 Jul 2021 20:24)  HR: 91 (13 Jul 2021 12:19) (76 - 98)  BP: 153/75 (13 Jul 2021 12:19) (153/75 - 174/80)  BP(mean): --  RR: 19 (13 Jul 2021 12:19) (17 - 20)  SpO2: 95% (13 Jul 2021 12:19) (95% - 97%)

## 2021-07-12 NOTE — PROGRESS NOTE ADULT - SUBJECTIVE AND OBJECTIVE BOX
Subjective: Pt is without complaints after his operation.  He has no pain, he tolerated some clear liquids, and he has already voided.      Vital Signs:  Vital Signs Last 24 Hrs  T(C): 37.1 (07-12-21 @ 20:24), Max: 37.1 (07-12-21 @ 20:24)  T(F): 98.7 (07-12-21 @ 20:24), Max: 98.7 (07-12-21 @ 20:24)  HR: 85 (07-12-21 @ 20:24) (69 - 89)  BP: 160/77 (07-12-21 @ 20:24) (116/87 - 170/87)  RR: 18 (07-12-21 @ 20:24) (15 - 24)  SpO2: 97% (07-12-21 @ 20:24) (93% - 100%) on (O2)    Telemetry/Alarms: On  General: WN/WD NAD  Neurology: Awake, nonfocal, OLIVARES x 4  Eyes: Scleras clear,  Gross vision intact  ENT: Gross hearing intact, grossly patent pharynx, no stridor  Neck: Neck supple, trachea midline, No JVD,   Respiratory: Unlabored breathing  CV: RRR,  Abdominal: Soft, NT, ND +BS, laparoscopic wounds with clean and dry gauze dressings; Umbilical wound has surrounding ecchymosis- no obvious hematoma   Extremities: No edema,  Skin: No Rashes, Hematoma, Ecchymosis  Lymphatic: No Neck, axilla, groin LAD  Psych: Oriented x 3, normal affect    Relevant labs, radiology and Medications reviewed    MEDICATIONS  (STANDING):  budesonide 160 MICROgram(s)/formoterol 4.5 MICROgram(s) Inhaler 2 Puff(s) Inhalation two times a day  heparin   Injectable 5000 Unit(s) SubCutaneous every 8 hours  HYDROmorphone PCA (1 mG/mL) 30 milliLiter(s) PCA Continuous PCA Continuous  lactated ringers. 1000 milliLiter(s) (75 mL/Hr) IV Continuous <Continuous>  latanoprost 0.005% Ophthalmic Solution 2 Drop(s) Both EYES at bedtime  tiotropium 18 MICROgram(s) Capsule 1 Capsule(s) Inhalation daily    MEDICATIONS  (PRN):  diphenhydrAMINE   Injectable 12.5 milliGRAM(s) IV Push every 4 hours PRN Pruritus  HYDROmorphone  Injectable 0.5 milliGRAM(s) IV Push every 10 minutes PRN Severe Pain (7 - 10)  HYDROmorphone PCA (1 mG/mL) Rescue Clinician Bolus 0.5 milliGRAM(s) IV Push every 15 minutes PRN for Pain Scale GREATER THAN 6  naloxone Injectable 0.1 milliGRAM(s) IV Push every 3 minutes PRN For ANY of the following changes in patient status:  A. RR LESS THAN 10 breaths per minute, B. Oxygen saturation LESS THAN 90%, C. Sedation score of 6  ondansetron Injectable 4 milliGRAM(s) IV Push every 6 hours PRN Nausea    Pertinent Physical Exam  I&O's Summary    12 Jul 2021 07:01  -  12 Jul 2021 21:00  --------------------------------------------------------  IN: 450 mL / OUT: 550 mL / NET: -100 mL    Assessment  stable s/p Robot-assisted repair of hiatal hernia with Toupet fundoplication    PLAN  Neuro: Pain management with PCA  Pulm: Encourage coughing, deep breathing and use of incentive spirometry. Wean off supplemental oxygen as able. Daily CXR.   Cardio: Monitor telemetry/alarms  GI: Tolerating diet  Renal: monitor urine output, supplement electrolytes as needed  Vasc: DVT prophylaxis  Heme: Trend H/H  ID: Off antibiotics. Stable.  Therapy: OOB/ambulate  Disposition: Aim to D/C to home tomorrow after his Barium Esophagram; Reason for stay is pain mgt and diet advancement    To be discussed with the Thoracic Surgery  Team at AM rounds.      Subjective: Pt is without complaints after his operation.  He has no pain, he tolerated some clear liquids, and he has already voided.      Vital Signs:  Vital Signs Last 24 Hrs  T(C): 37.1 (07-12-21 @ 20:24), Max: 37.1 (07-12-21 @ 20:24)  T(F): 98.7 (07-12-21 @ 20:24), Max: 98.7 (07-12-21 @ 20:24)  HR: 85 (07-12-21 @ 20:24) (69 - 89)  BP: 160/77 (07-12-21 @ 20:24) (116/87 - 170/87)  RR: 18 (07-12-21 @ 20:24) (15 - 24)  SpO2: 97% (07-12-21 @ 20:24) (93% - 100%) on (O2)    Telemetry/Alarms: On  General: WN/WD NAD  Neurology: Awake, nonfocal, OLIVARES x 4  Eyes: Scleras clear,  Gross vision intact  ENT: Gross hearing intact, grossly patent pharynx, no stridor  Neck: Neck supple, trachea midline, No JVD,   Respiratory: Unlabored breathing  CV: RRR,  Abdominal: Soft, NT, ND +BS, laparoscopic wounds with clean and dry gauze dressings; Umbilical wound has surrounding ecchymosis- no obvious hematoma   Extremities: No edema,  Skin: No Rashes, No Hematoma, + umbilical ecchymosis  Lymphatic: No Neck, axilla, groin LAD  Psych: Oriented x 3, normal affect    Relevant labs, radiology and Medications reviewed    MEDICATIONS  (STANDING):  budesonide 160 MICROgram(s)/formoterol 4.5 MICROgram(s) Inhaler 2 Puff(s) Inhalation two times a day  heparin   Injectable 5000 Unit(s) SubCutaneous every 8 hours  HYDROmorphone PCA (1 mG/mL) 30 milliLiter(s) PCA Continuous PCA Continuous  lactated ringers. 1000 milliLiter(s) (75 mL/Hr) IV Continuous <Continuous>  latanoprost 0.005% Ophthalmic Solution 2 Drop(s) Both EYES at bedtime  tiotropium 18 MICROgram(s) Capsule 1 Capsule(s) Inhalation daily    MEDICATIONS  (PRN):  diphenhydrAMINE   Injectable 12.5 milliGRAM(s) IV Push every 4 hours PRN Pruritus  HYDROmorphone  Injectable 0.5 milliGRAM(s) IV Push every 10 minutes PRN Severe Pain (7 - 10)  HYDROmorphone PCA (1 mG/mL) Rescue Clinician Bolus 0.5 milliGRAM(s) IV Push every 15 minutes PRN for Pain Scale GREATER THAN 6  naloxone Injectable 0.1 milliGRAM(s) IV Push every 3 minutes PRN For ANY of the following changes in patient status:  A. RR LESS THAN 10 breaths per minute, B. Oxygen saturation LESS THAN 90%, C. Sedation score of 6  ondansetron Injectable 4 milliGRAM(s) IV Push every 6 hours PRN Nausea    Pertinent Physical Exam  I&O's Summary    12 Jul 2021 07:01  -  12 Jul 2021 21:00  --------------------------------------------------------  IN: 450 mL / OUT: 550 mL / NET: -100 mL    Assessment  stable s/p Robot-assisted repair of hiatal hernia with Toupet fundoplication    PLAN  Neuro: Pain management with PCA  Pulm: Encourage coughing, deep breathing and use of incentive spirometry. Wean off supplemental oxygen as able. Daily CXR.   Cardio: Monitor telemetry/alarms  GI: Tolerating diet  Renal: monitor urine output, supplement electrolytes as needed  Vasc: DVT prophylaxis  Heme: Trend H/H  ID: Off antibiotics. Stable.  Therapy: OOB/ambulate  Disposition: Aim to D/C to home tomorrow after his Barium Esophagram; Reason for stay is pain mgt and diet advancement    To be discussed with the Thoracic Surgery  Team at AM rounds.

## 2021-07-12 NOTE — DISCHARGE NOTE PROVIDER - NSDCMRMEDTOKEN_GEN_ALL_CORE_FT
amlodipine-valsartan 5 mg-160 mg oral tablet: 1 tab(s) orally once a day  ferrous sulfate 325 mg (65 mg elemental iron) oral tablet: orally once a day  Lialda 1.2 g oral delayed release tablet: 2 tab(s) orally once a day  Multi-Day Plus Minerals oral tablet: 1 tab(s) orally once a day, last dose 6/30/2021  pantoprazole 40 mg oral delayed release tablet: 1 tab(s) orally 2 times a day  simvastatin 40 mg oral tablet: 1 tab(s) orally once a day (at bedtime)  Spiriva: 2.5 microgram(s) inhaled once a day  sucralfate 1 g/10 mL oral suspension: 10 milliliter(s) orally 4 times a day (before meals and at bedtime)  Symbicort 160 mcg-4.5 mcg/inh inhalation aerosol: 2 puff(s) inhaled 2 times a day  Vitamin D3: 1 tab(s) orally once a day  Xalatan 0.005% ophthalmic solution: 2 drop(s) to each each eye once a day (in the evening)   amlodipine-valsartan 5 mg-160 mg oral tablet: 1 tab(s) orally once a day  ferrous sulfate 325 mg (65 mg elemental iron) oral tablet: orally once a day  Lialda 1.2 g oral delayed release tablet: 2 tab(s) orally once a day  Multi-Day Plus Minerals oral tablet: 1 tab(s) orally once a day  polyethylene glycol 3350 oral powder for reconstitution: 17 gram(s) orally once a day  simethicone 80 mg oral tablet, chewable: 1 tab(s) orally 3 times a day  simvastatin 40 mg oral tablet: 1 tab(s) orally once a day (at bedtime)  Spiriva: 2.5 microgram(s) inhaled once a day  Symbicort 160 mcg-4.5 mcg/inh inhalation aerosol: 2 puff(s) inhaled 2 times a day  traMADol 50 mg oral tablet: 1 tab(s) orally every 4 hours, As Needed MDD:6   Tylenol 325 mg oral tablet: 2 tab(s) orally every 6 hours, As Needed  Vitamin D3: 1 tab(s) orally once a day  Xalatan 0.005% ophthalmic solution: 2 drop(s) to each each eye once a day (in the evening)

## 2021-07-12 NOTE — DISCHARGE NOTE PROVIDER - NSDCFUADDINST_GEN_ALL_CORE_FT
You can remove the plastic and gauze dressings tomorrow but leave the tapes that are directly over the wounds as they will come off in the shower.  Wash with soap and water and let the wounds air dry.  Watch for bleeding or pus or fevers and worsening pain and if noted, call Dr Silverman. You can remove the plastic and gauze dressings tomorrow but leave the tapes that are directly over the wounds as they will come off in the shower.  Wash with soap and water and let the wounds air dry.  Watch for bleeding or pus or fevers and worsening pain, inability to tolerate diet and if noted, call Dr Silverman.  Walk frequently. YOu may climb stairs. Continue to use incentive spirometer.

## 2021-07-12 NOTE — BRIEF OPERATIVE NOTE - OPERATION/FINDINGS
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

## 2021-07-12 NOTE — ASU PATIENT PROFILE, ADULT - NS SC CAGE ALCOHOL GUILTY ABOUT
I have personally seen and examined this patient. I have fully participated in the care of this patient. I have reviewed all pertinent clinical information, including history physical exam, plan and the Resident's note and agree except as noted
no

## 2021-07-12 NOTE — BRIEF OPERATIVE NOTE - COMMENTS
JESSICA Estrella, provided direct first assist support to the surgeon during this surgical procedure. My involvement included positioning, prepping and draping the patient prior to surgery, ensuring clear visibility and exposure for the surgeon by using instruments such as retractors and suction, closing surgical incisions and dressing wounds. As well as other tasks as directed by the surgeon.

## 2021-07-12 NOTE — BRIEF OPERATIVE NOTE - NSICDXBRIEFPROCEDURE_GEN_ALL_CORE_FT
PROCEDURES:  Robot-assisted Toupet fundoplication 12-Jul-2021 10:47:33  Will Simons  Repair of hiatal hernia with fundoplication 12-Jul-2021 10:47:48  Will Simons

## 2021-07-12 NOTE — DISCHARGE NOTE PROVIDER - NSDCFUADDAPPT_GEN_ALL_CORE_FT
See Dr Silverman in 2 weeks.  Bring a new chest X-ray when you come.   See Dr Silverman in 2 weeks.  Call to make an apt. [phone]  See your PCP in 2-3 weeks.

## 2021-07-13 ENCOUNTER — TRANSCRIPTION ENCOUNTER (OUTPATIENT)
Age: 76
End: 2021-07-13

## 2021-07-13 VITALS
TEMPERATURE: 98 F | OXYGEN SATURATION: 95 % | SYSTOLIC BLOOD PRESSURE: 153 MMHG | RESPIRATION RATE: 19 BRPM | DIASTOLIC BLOOD PRESSURE: 75 MMHG | HEART RATE: 91 BPM

## 2021-07-13 LAB
ANION GAP SERPL CALC-SCNC: 14 MMOL/L — SIGNIFICANT CHANGE UP (ref 7–14)
BASOPHILS # BLD AUTO: 0.02 K/UL — SIGNIFICANT CHANGE UP (ref 0–0.2)
BASOPHILS NFR BLD AUTO: 0.2 % — SIGNIFICANT CHANGE UP (ref 0–2)
BUN SERPL-MCNC: 14 MG/DL — SIGNIFICANT CHANGE UP (ref 7–23)
CALCIUM SERPL-MCNC: 9.4 MG/DL — SIGNIFICANT CHANGE UP (ref 8.4–10.5)
CHLORIDE SERPL-SCNC: 101 MMOL/L — SIGNIFICANT CHANGE UP (ref 98–107)
CO2 SERPL-SCNC: 25 MMOL/L — SIGNIFICANT CHANGE UP (ref 22–31)
CREAT SERPL-MCNC: 0.96 MG/DL — SIGNIFICANT CHANGE UP (ref 0.5–1.3)
EOSINOPHIL # BLD AUTO: 0.01 K/UL — SIGNIFICANT CHANGE UP (ref 0–0.5)
EOSINOPHIL NFR BLD AUTO: 0.1 % — SIGNIFICANT CHANGE UP (ref 0–6)
GLUCOSE SERPL-MCNC: 105 MG/DL — HIGH (ref 70–99)
HCT VFR BLD CALC: 38.1 % — LOW (ref 39–50)
HGB BLD-MCNC: 11.8 G/DL — LOW (ref 13–17)
IANC: 6.88 K/UL — SIGNIFICANT CHANGE UP (ref 1.5–8.5)
IMM GRANULOCYTES NFR BLD AUTO: 0.5 % — SIGNIFICANT CHANGE UP (ref 0–1.5)
LYMPHOCYTES # BLD AUTO: 0.48 K/UL — LOW (ref 1–3.3)
LYMPHOCYTES # BLD AUTO: 5.8 % — LOW (ref 13–44)
MCHC RBC-ENTMCNC: 29.5 PG — SIGNIFICANT CHANGE UP (ref 27–34)
MCHC RBC-ENTMCNC: 31 GM/DL — LOW (ref 32–36)
MCV RBC AUTO: 95.3 FL — SIGNIFICANT CHANGE UP (ref 80–100)
MONOCYTES # BLD AUTO: 0.81 K/UL — SIGNIFICANT CHANGE UP (ref 0–0.9)
MONOCYTES NFR BLD AUTO: 9.8 % — SIGNIFICANT CHANGE UP (ref 2–14)
NEUTROPHILS # BLD AUTO: 6.88 K/UL — SIGNIFICANT CHANGE UP (ref 1.8–7.4)
NEUTROPHILS NFR BLD AUTO: 83.6 % — HIGH (ref 43–77)
NRBC # BLD: 0 /100 WBCS — SIGNIFICANT CHANGE UP
NRBC # FLD: 0 K/UL — SIGNIFICANT CHANGE UP
PLATELET # BLD AUTO: 276 K/UL — SIGNIFICANT CHANGE UP (ref 150–400)
POTASSIUM SERPL-MCNC: 4.6 MMOL/L — SIGNIFICANT CHANGE UP (ref 3.5–5.3)
POTASSIUM SERPL-SCNC: 4.6 MMOL/L — SIGNIFICANT CHANGE UP (ref 3.5–5.3)
RBC # BLD: 4 M/UL — LOW (ref 4.2–5.8)
RBC # FLD: 15.5 % — HIGH (ref 10.3–14.5)
SODIUM SERPL-SCNC: 140 MMOL/L — SIGNIFICANT CHANGE UP (ref 135–145)
WBC # BLD: 8.24 K/UL — SIGNIFICANT CHANGE UP (ref 3.8–10.5)
WBC # FLD AUTO: 8.24 K/UL — SIGNIFICANT CHANGE UP (ref 3.8–10.5)

## 2021-07-13 PROCEDURE — 74220 X-RAY XM ESOPHAGUS 1CNTRST: CPT | Mod: 26

## 2021-07-13 PROCEDURE — 71045 X-RAY EXAM CHEST 1 VIEW: CPT | Mod: 26

## 2021-07-13 RX ORDER — PANTOPRAZOLE SODIUM 20 MG/1
1 TABLET, DELAYED RELEASE ORAL
Qty: 0 | Refills: 0 | DISCHARGE

## 2021-07-13 RX ORDER — POLYETHYLENE GLYCOL 3350 17 G/17G
17 POWDER, FOR SOLUTION ORAL
Qty: 0 | Refills: 0 | DISCHARGE
Start: 2021-07-13

## 2021-07-13 RX ORDER — SIMETHICONE 80 MG/1
1 TABLET, CHEWABLE ORAL
Qty: 0 | Refills: 0 | DISCHARGE
Start: 2021-07-13

## 2021-07-13 RX ORDER — SUCRALFATE 1 G
10 TABLET ORAL
Qty: 0 | Refills: 0 | DISCHARGE

## 2021-07-13 RX ORDER — SIMETHICONE 80 MG/1
1 TABLET, CHEWABLE ORAL
Qty: 21 | Refills: 0
Start: 2021-07-13 | End: 2021-07-19

## 2021-07-13 RX ORDER — POLYETHYLENE GLYCOL 3350 17 G/17G
17 POWDER, FOR SOLUTION ORAL DAILY
Refills: 0 | Status: DISCONTINUED | OUTPATIENT
Start: 2021-07-13 | End: 2021-07-13

## 2021-07-13 RX ORDER — SIMETHICONE 80 MG/1
80 TABLET, CHEWABLE ORAL THREE TIMES A DAY
Refills: 0 | Status: DISCONTINUED | OUTPATIENT
Start: 2021-07-13 | End: 2021-07-13

## 2021-07-13 RX ORDER — TRAMADOL HYDROCHLORIDE 50 MG/1
1 TABLET ORAL
Qty: 42 | Refills: 0
Start: 2021-07-13 | End: 2021-07-19

## 2021-07-13 RX ORDER — OXYCODONE HYDROCHLORIDE 5 MG/1
5 TABLET ORAL EVERY 4 HOURS
Refills: 0 | Status: DISCONTINUED | OUTPATIENT
Start: 2021-07-13 | End: 2021-07-13

## 2021-07-13 RX ORDER — OXYCODONE HYDROCHLORIDE 5 MG/1
10 TABLET ORAL EVERY 4 HOURS
Refills: 0 | Status: DISCONTINUED | OUTPATIENT
Start: 2021-07-13 | End: 2021-07-13

## 2021-07-13 RX ADMIN — Medication 10 MILLIGRAM(S): at 00:04

## 2021-07-13 RX ADMIN — HYDROMORPHONE HYDROCHLORIDE 30 MILLILITER(S): 2 INJECTION INTRAMUSCULAR; INTRAVENOUS; SUBCUTANEOUS at 07:13

## 2021-07-13 RX ADMIN — BUDESONIDE AND FORMOTEROL FUMARATE DIHYDRATE 2 PUFF(S): 160; 4.5 AEROSOL RESPIRATORY (INHALATION) at 10:00

## 2021-07-13 RX ADMIN — TIOTROPIUM BROMIDE 1 CAPSULE(S): 18 CAPSULE ORAL; RESPIRATORY (INHALATION) at 10:05

## 2021-07-13 RX ADMIN — HEPARIN SODIUM 5000 UNIT(S): 5000 INJECTION INTRAVENOUS; SUBCUTANEOUS at 05:16

## 2021-07-13 RX ADMIN — POLYETHYLENE GLYCOL 3350 17 GRAM(S): 17 POWDER, FOR SOLUTION ORAL at 11:52

## 2021-07-13 NOTE — PROGRESS NOTE ADULT - SUBJECTIVE AND OBJECTIVE BOX
Anesthesia Pain Management Service    SUBJECTIVE: Pt now off IV PCA without problems reported.    Therapy:	  [ X] IV PCA	   [ ] Epidural           [ ] s/p Spinal Opoid              [ ] Postpartum infusion	  [ ] Patient controlled regional anesthesia (PCRA)    [ ] prn Analgesics    Allergies    No Known Allergies    Intolerances      MEDICATIONS  (STANDING):  budesonide 160 MICROgram(s)/formoterol 4.5 MICROgram(s) Inhaler 2 Puff(s) Inhalation two times a day  heparin   Injectable 5000 Unit(s) SubCutaneous every 8 hours  latanoprost 0.005% Ophthalmic Solution 2 Drop(s) Both EYES at bedtime  polyethylene glycol 3350 17 Gram(s) Oral daily  simethicone 80 milliGRAM(s) Chew three times a day  tiotropium 18 MICROgram(s) Capsule 1 Capsule(s) Inhalation daily    MEDICATIONS  (PRN):  HYDROmorphone  Injectable 0.5 milliGRAM(s) IV Push every 10 minutes PRN Severe Pain (7 - 10)  oxyCODONE    IR 5 milliGRAM(s) Oral every 4 hours PRN Moderate Pain (4 - 6)  oxyCODONE    IR 10 milliGRAM(s) Oral every 4 hours PRN Severe Pain (7 - 10)      OBJECTIVE:   [X] No new signs     [ ] Other:    Side Effects:  [X ] None			[ ] Other:    Assessment of Catheter Site:		[ ] Intact		[ ] Other:    ASSESSMENT/PLAN  [ ] Continue current therapy    [X ] Therapy changed to:    [ ] IV PCA       [ ] Epidural     [ X] prn Analgesics     Comments: PRN Oral/IV opioids and/or non-opioid adjuvant analgesics to be used at this point.    Progress Note written now but Patient was seen earlier.

## 2021-07-13 NOTE — DISCHARGE NOTE NURSING/CASE MANAGEMENT/SOCIAL WORK - NSDCPNINST_GEN_ALL_CORE
Take medication as ordered, follow up with MD as advised, call to make appointments tomorrow, follow diet as per MD instruction, monitor abdominal incision for healing,   if sign of infection call MD such as fever over 100.4F, redness, swelling or drainage at sight. Gradually increase activity, walking is good, no heavy lifting

## 2021-07-13 NOTE — DISCHARGE NOTE NURSING/CASE MANAGEMENT/SOCIAL WORK - PATIENT PORTAL LINK FT
You can access the FollowMyHealth Patient Portal offered by [facility] by registering at the following website: [email or web address]. By joining [facility]’s FollowMyHealth portal, you will also be able to view your health information using other applications (apps) compatible with our system.

## 2021-07-13 NOTE — PROVIDER CONTACT NOTE (OTHER) - ASSESSMENT
NAD noted. pt using PCA pump with adequate pain relief. denies any pain or discomfort. pt states "I feel great!"

## 2021-07-14 ENCOUNTER — NON-APPOINTMENT (OUTPATIENT)
Age: 76
End: 2021-07-14

## 2021-07-23 LAB — SURGICAL PATHOLOGY STUDY: SIGNIFICANT CHANGE UP

## 2021-08-05 ENCOUNTER — OUTPATIENT (OUTPATIENT)
Dept: OUTPATIENT SERVICES | Facility: HOSPITAL | Age: 76
LOS: 1 days | End: 2021-08-05
Payer: MEDICARE

## 2021-08-05 ENCOUNTER — RESULT REVIEW (OUTPATIENT)
Age: 76
End: 2021-08-05

## 2021-08-05 ENCOUNTER — APPOINTMENT (OUTPATIENT)
Dept: THORACIC SURGERY | Facility: CLINIC | Age: 76
End: 2021-08-05
Payer: MEDICARE

## 2021-08-05 ENCOUNTER — APPOINTMENT (OUTPATIENT)
Dept: RADIOLOGY | Facility: HOSPITAL | Age: 76
End: 2021-08-05

## 2021-08-05 VITALS
TEMPERATURE: 97.8 F | DIASTOLIC BLOOD PRESSURE: 92 MMHG | SYSTOLIC BLOOD PRESSURE: 182 MMHG | BODY MASS INDEX: 24.92 KG/M2 | WEIGHT: 146 LBS | OXYGEN SATURATION: 95 % | HEART RATE: 88 BPM | HEIGHT: 64 IN

## 2021-08-05 DIAGNOSIS — C34.90 MALIGNANT NEOPLASM OF UNSPECIFIED PART OF UNSPECIFIED BRONCHUS OR LUNG: ICD-10-CM

## 2021-08-05 DIAGNOSIS — Z98.890 OTHER SPECIFIED POSTPROCEDURAL STATES: Chronic | ICD-10-CM

## 2021-08-05 DIAGNOSIS — Z90.89 ACQUIRED ABSENCE OF OTHER ORGANS: Chronic | ICD-10-CM

## 2021-08-05 PROCEDURE — 71046 X-RAY EXAM CHEST 2 VIEWS: CPT | Mod: 26

## 2021-08-05 PROCEDURE — 99024 POSTOP FOLLOW-UP VISIT: CPT

## 2021-08-11 ENCOUNTER — LABORATORY RESULT (OUTPATIENT)
Age: 76
End: 2021-08-11

## 2021-08-11 ENCOUNTER — APPOINTMENT (OUTPATIENT)
Dept: INTERNAL MEDICINE | Facility: CLINIC | Age: 76
End: 2021-08-11
Payer: MEDICARE

## 2021-08-11 VITALS
WEIGHT: 140 LBS | OXYGEN SATURATION: 95 % | HEART RATE: 89 BPM | BODY MASS INDEX: 23.9 KG/M2 | TEMPERATURE: 97.8 F | HEIGHT: 64 IN | DIASTOLIC BLOOD PRESSURE: 80 MMHG | SYSTOLIC BLOOD PRESSURE: 134 MMHG

## 2021-08-11 DIAGNOSIS — D64.9 ANEMIA, UNSPECIFIED: ICD-10-CM

## 2021-08-11 PROCEDURE — 36415 COLL VENOUS BLD VENIPUNCTURE: CPT

## 2021-08-11 PROCEDURE — 99214 OFFICE O/P EST MOD 30 MIN: CPT | Mod: 25

## 2021-08-12 ENCOUNTER — APPOINTMENT (OUTPATIENT)
Dept: PULMONOLOGY | Facility: CLINIC | Age: 76
End: 2021-08-12
Payer: MEDICARE

## 2021-08-12 VITALS
OXYGEN SATURATION: 93 % | SYSTOLIC BLOOD PRESSURE: 134 MMHG | TEMPERATURE: 97.8 F | DIASTOLIC BLOOD PRESSURE: 76 MMHG | HEART RATE: 91 BPM

## 2021-08-12 LAB
ALBUMIN SERPL ELPH-MCNC: 3.5 G/DL
ALP BLD-CCNC: 79 U/L
ALT SERPL-CCNC: 10 U/L
ANION GAP SERPL CALC-SCNC: 11 MMOL/L
APPEARANCE: ABNORMAL
AST SERPL-CCNC: 16 U/L
BASOPHILS # BLD AUTO: 0.04 K/UL
BASOPHILS NFR BLD AUTO: 0.7 %
BILIRUB SERPL-MCNC: 0.3 MG/DL
BILIRUBIN URINE: NEGATIVE
BLOOD URINE: ABNORMAL
BUN SERPL-MCNC: 28 MG/DL
CALCIUM SERPL-MCNC: 8.5 MG/DL
CHLORIDE SERPL-SCNC: 101 MMOL/L
CO2 SERPL-SCNC: 29 MMOL/L
COLOR: YELLOW
CREAT SERPL-MCNC: 1.49 MG/DL
EOSINOPHIL # BLD AUTO: 0.15 K/UL
EOSINOPHIL NFR BLD AUTO: 2.8 %
FERRITIN SERPL-MCNC: 275 NG/ML
GLUCOSE QUALITATIVE U: NEGATIVE
GLUCOSE SERPL-MCNC: 116 MG/DL
HCT VFR BLD CALC: 36 %
HGB BLD-MCNC: 10.7 G/DL
IMM GRANULOCYTES NFR BLD AUTO: 0.2 %
IRON SATN MFR SERPL: 7 %
IRON SERPL-MCNC: 15 UG/DL
KETONES URINE: NEGATIVE
LEUKOCYTE ESTERASE URINE: ABNORMAL
LYMPHOCYTES # BLD AUTO: 0.33 K/UL
LYMPHOCYTES NFR BLD AUTO: 6.1 %
MAN DIFF?: NORMAL
MCHC RBC-ENTMCNC: 28.9 PG
MCHC RBC-ENTMCNC: 29.7 GM/DL
MCV RBC AUTO: 97.3 FL
MONOCYTES # BLD AUTO: 0.54 K/UL
MONOCYTES NFR BLD AUTO: 10 %
NEUTROPHILS # BLD AUTO: 4.32 K/UL
NEUTROPHILS NFR BLD AUTO: 80.2 %
NITRITE URINE: NEGATIVE
PH URINE: 6.5
PLATELET # BLD AUTO: 209 K/UL
POTASSIUM SERPL-SCNC: 3.6 MMOL/L
PROT SERPL-MCNC: 6.1 G/DL
PROTEIN URINE: ABNORMAL
RBC # BLD: 3.7 M/UL
RBC # FLD: 16.8 %
SODIUM SERPL-SCNC: 142 MMOL/L
SPECIFIC GRAVITY URINE: 1.01
TIBC SERPL-MCNC: 204 UG/DL
UIBC SERPL-MCNC: 189 UG/DL
UROBILINOGEN URINE: NORMAL
WBC # FLD AUTO: 5.39 K/UL

## 2021-08-12 PROCEDURE — 99204 OFFICE O/P NEW MOD 45 MIN: CPT

## 2021-08-12 PROCEDURE — 99214 OFFICE O/P EST MOD 30 MIN: CPT

## 2021-08-13 NOTE — HISTORY OF PRESENT ILLNESS
[TextBox_4] : Follow-up after recent surgery for repair of hiatal hernia.  Status post fundoplication repair of umbilical hernia.  According to wife patient is "tired" exercise tolerance reduced compared to preop.  Had lab work done by PCP to review.

## 2021-08-13 NOTE — ASSESSMENT
[FreeTextEntry1] : Worsening COPD likely secondary to postoperative change.  Given short course of steroids recommend short interval follow-up.  If O2 saturations do not improve will need blood gas for assessment of hypercapnia

## 2021-08-16 LAB — BACTERIA UR CULT: ABNORMAL

## 2021-08-19 LAB — SARS-COV-2 N GENE NPH QL NAA+PROBE: NOT DETECTED

## 2021-08-20 ENCOUNTER — APPOINTMENT (OUTPATIENT)
Dept: PULMONOLOGY | Facility: CLINIC | Age: 76
End: 2021-08-20
Payer: MEDICARE

## 2021-08-20 VITALS
HEART RATE: 66 BPM | SYSTOLIC BLOOD PRESSURE: 157 MMHG | TEMPERATURE: 97.5 F | OXYGEN SATURATION: 96 % | DIASTOLIC BLOOD PRESSURE: 56 MMHG

## 2021-08-20 DIAGNOSIS — Z20.822 CONTACT WITH AND (SUSPECTED) EXPOSURE TO COVID-19: ICD-10-CM

## 2021-08-20 PROCEDURE — 94010 BREATHING CAPACITY TEST: CPT

## 2021-08-20 PROCEDURE — 99213 OFFICE O/P EST LOW 20 MIN: CPT | Mod: 25

## 2021-08-21 NOTE — HISTORY OF PRESENT ILLNESS
[TextBox_4] : Seen last week for worsening COPD after thoracic surgery status post course of steroids feels significantly better much less short of breath.  Noted lower extremity edema.

## 2021-08-21 NOTE — PROCEDURE
[FreeTextEntry1] : Spirometry demonstrates interval improvement FEV1 32% predicted 320 cc increase in FEV1 from prior visit

## 2021-08-21 NOTE — ASSESSMENT
[FreeTextEntry1] : COPD status post exacerbation.  Edema likely secondary to steroids monitor for resolution.  Continue inhalers

## 2021-08-21 NOTE — PHYSICAL EXAM
[No Acute Distress] : no acute distress [Normal Oropharynx] : normal oropharynx [Normal Appearance] : normal appearance [No Neck Mass] : no neck mass [Normal Rate/Rhythm] : normal rate/rhythm [Normal S1, S2] : normal s1, s2 [No Murmurs] : no murmurs [No Resp Distress] : no resp distress [Clear to Auscultation Bilaterally] : clear to auscultation bilaterally [No Abnormalities] : no abnormalities [Benign] : benign [Normal Gait] : normal gait [No Clubbing] : no clubbing [No Cyanosis] : no cyanosis [FROM] : FROM [2+ Pitting] : 2+ pitting [Normal Color/ Pigmentation] : normal color/ pigmentation [No Focal Deficits] : no focal deficits [Oriented x3] : oriented x3 [Normal Affect] : normal affect

## 2021-08-29 NOTE — REVIEW OF SYSTEMS
[Fatigue] : fatigue [Dyspnea on Exertion] : dyspnea on exertion [Dysuria] : dysuria [Frequency] : frequency [Negative] : Heme/Lymph [Fever] : no fever [Chills] : no chills [Night Sweats] : no night sweats [Recent Change In Weight] : ~T no recent weight change [Chest Pain] : no chest pain [Orthopena] : no orthopnea [Shortness Of Breath] : no shortness of breath [Wheezing] : no wheezing [Cough] : no cough [Abdominal Pain] : no abdominal pain [Vomiting] : no vomiting [Incontinence] : no incontinence [Hematuria] : no hematuria [Back Pain] : no back pain [Headache] : no headache [Fainting] : no fainting [Confusion] : no confusion [Unsteady Walk] : no ataxia [Memory Loss] : no memory loss

## 2021-08-29 NOTE — HISTORY OF PRESENT ILLNESS
[Spouse] : spouse [de-identified] : presents for eval of feeling somewhat fatigued , needing to take naps over past few weeks. his wife was concerned. he feels his appetite is stable. no significant change in weight. \par dysuria and urinary urgency is also noted over past few days, urination 9-10x/day. no hematuria \par COPD significant, former smoker \par \par s/p hiatal hernia repair 7/21 w Dr Silverman \par hx of PSA elevation in setting of chronic BPH, following w Dr Sanchez urology

## 2021-08-29 NOTE — ASSESSMENT
[FreeTextEntry1] : discussed w pt and his wife \par \par noted recent fatigue, requiring naps during day. though he says he feels generall the same. \par no significant change in weight. \par symptoms of dysuria in setting of BPH, suggestive of some urinary obstruction and risk for UTI. \par will start empiric tx w ciprofloxacin. \par check UA, culture. \par urology f/u for BPH. \par \par cont f/u w Dr Pearce for management of significant COPD \par \par check additional labs as below today due to fatigue, monitor CBC\par \par RTO 1 month for f/u or earlier prn if any new concerns

## 2021-08-29 NOTE — PHYSICAL EXAM
[No Acute Distress] : no acute distress [Normal Voice/Communication] : normal voice/communication [No Lymphadenopathy] : no lymphadenopathy [Normal] : no respiratory distress, lungs were clear to auscultation bilaterally and no accessory muscle use [Normal Rate] : normal rate  [Regular Rhythm] : with a regular rhythm [Normal S1, S2] : normal S1 and S2 [No Edema] : there was no peripheral edema [Coordination Grossly Intact] : coordination grossly intact [Normal Gait] : normal gait [Speech Grossly Normal] : speech grossly normal [Normal Affect] : the affect was normal [Alert and Oriented x3] : oriented to person, place, and time [Normal Mood] : the mood was normal [Normal Insight/Judgement] : insight and judgment were intact

## 2021-09-17 ENCOUNTER — LABORATORY RESULT (OUTPATIENT)
Age: 76
End: 2021-09-17

## 2021-09-19 ENCOUNTER — NON-APPOINTMENT (OUTPATIENT)
Age: 76
End: 2021-09-19

## 2021-09-23 ENCOUNTER — APPOINTMENT (OUTPATIENT)
Dept: UROLOGY | Facility: CLINIC | Age: 76
End: 2021-09-23
Payer: MEDICARE

## 2021-09-23 PROCEDURE — 99214 OFFICE O/P EST MOD 30 MIN: CPT

## 2021-09-23 NOTE — HISTORY OF PRESENT ILLNESS
[FreeTextEntry1] : Mr. Ramos is a 73 year old male presented with elevated PSA. PSA from 1/18/18 was 7.43 ng/mL and previously 8.11 ng/mL on 12/13/17. Patient had two prostate needle biopsies in the past and pathology on 3/9/10 showed benign prostatic tissue and focal mild chronic prostatitis. Patient has seen Dr. Manuel two years ago for erectile dysfunction and decreased libido.  Had tried Trimix injections and oral medications for ED in the past but notes they do not improved his erections. Patient's wife has lung cancer and breast cancer. Has Crohn's disease but is not currently active. Current smoker. \par 8/16/18: The patient returned and reported he finished his Bactrim course. PSA from 8/13/18 was 11.77 ng/mL and a percent free PSA of 16.1%. \par 9/5/18: The patient returned to discuss MRI results. MRI of the prostate from 8/18/18 findings showed no suspicious lesions for clinically significant prostate cancer. If total PSA today is over 15 ng/mL and percent free PSA below 14%, we will proceed with biopsy. \par 11/21/18: The patient returned and reported his urination is adequate. Patient denied dysuria, gross hematuria, urgency, or incontinence. Wakes up 1-2 times during the night to urinate. Complained of ED, has tried viagra and Trimix injections in the past. Is willing to reconsider trying 7 tablets of Sildenafil. PSA from 11/12/18 was 9.50 ng/mL, current PSA is within range of patient, will watch PSA. \par 4/15/19: The patient returned today and reported that his urination is satisfactory. Patient denies dysuria, gross hematuria, pushing or straining to urinate, urgency, or incontinence. Wakes up once during the night to urinate. Patient hasn't tried the Sildenafil that I prescribed during his last visit. 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 Today he states that his urination is satisfactory. Patient denies dysuria, gross hematuria, urgency or incontinence. He is feeling very good overall. He has a Hx of elevated PSA and his most recent PSA from 9/17/19 was 8.77 ng/mL. An MRI was completed last year and determined PIRADS 2. A biopsy was not necessary at that time. \par \par 03/11/2020: Patient presents today for a follow up. His PSA as of 3/9/2020 was 10.10 ng/mL which has elevated from prior PSA of 8.77 from 9/17/2019. His creatinine was 1.21mg/dL. The patient denies chest pains and constipation.\par \par 02/17/2021: Patient presents today for follow up. Feels well. Wakes 0-1x at night to urinate. Urinates 2-3x during the day. Denies urinary urgency, pushing or straining, gross hematuria, or dysuria. No constipation. No hx or FHx of kidney stones. No FHx of prostate CA. 7/16/20 MRI prostate revealed score of PIRADS 2. Reviewed recent 2/10/21 lab results. PSA 12.20. H/o Crohn's disease, not on any steroids. PSHx lung wedge resection. Former smoker of one pack a day for more than 30 years, and quit 4 years ago. Wife had lung and breast CA and is now in remission. Feels well today and offers no new complaints. \par \par 09/23/2021: Patient presents today for a follow up visit. Had lab work done on 9/17/2021 in anticipation for today's appointment. Patient's PSA was 15.3 which is further elevated than his previous elevated PSA's. Last PSA before this one was 12.2 on 2/10/2021. Pt had an MRI of the prostate in 2020 which demonstrated a PIRADS-2. His PSA has been stable but rising slowly. All other lab work is normal. Patient received the 3rd Pfizer covid vaccine booster on 9/10/2021.

## 2021-09-23 NOTE — PHYSICAL EXAM
[General Appearance - Well Developed] : well developed [General Appearance - Well Nourished] : well nourished [Normal Appearance] : normal appearance [Well Groomed] : well groomed [General Appearance - In No Acute Distress] : no acute distress [Abdomen Soft] : soft [Abdomen Tenderness] : non-tender [Costovertebral Angle Tenderness] : no ~M costovertebral angle tenderness [Edema] : no peripheral edema [] : no respiratory distress [Respiration, Rhythm And Depth] : normal respiratory rhythm and effort [Exaggerated Use Of Accessory Muscles For Inspiration] : no accessory muscle use [Oriented To Time, Place, And Person] : oriented to person, place, and time [Affect] : the affect was normal [Mood] : the mood was normal [Not Anxious] : not anxious [Normal Station and Gait] : the gait and station were normal for the patient's age [No Focal Deficits] : no focal deficits [FreeTextEntry1] : The knee-chest position was utilized for the MERRICK.Digital rectal exam found no suspicious rectal masses. Anal tone is normal. The prostate is non tender, with normal texture, discrete borders, and no nodules. It is a 45 gram transurethral resection size. No gross blood on the examining finger.

## 2021-09-23 NOTE — ASSESSMENT
[FreeTextEntry1] : Mr. Ramos is a 75 year old male presented with elevated PSA. PSA from 1/18/18 was 7.43 ng/mL and previously 8.11 ng/mL on 12/13/17. Patient had two prostate needle biopsies in the past and pathology on 3/9/10 showed benign prostatic tissue and focal mild chronic prostatitis. Patient has seen Dr. Manuel two years ago for erectile dysfunction and decreased libido.  Had tried Trimix injections and oral medications for ED in the past but notes they do not improved his erections. Patient's wife has lung cancer and breast cancer. Has Crohn's disease but is not currently active. Current smoker. \par 8/16/18: The patient returned and reported he finished his Bactrim course. PSA from 8/13/18 was 11.77 ng/mL and a percent free PSA of 16.1%. \par 9/5/18: The patient returned to discuss MRI results. MRI of the prostate from 8/18/18 findings showed no suspicious lesions for clinically significant prostate cancer. If total PSA today is over 15 ng/mL and percent free PSA below 14%, we will proceed with biopsy. \par 11/21/18: The patient returned and reported his urination is adequate. Patient denied dysuria, gross hematuria, urgency, or incontinence. Wakes up 1-2 times during the night to urinate. Complained of ED, has tried viagra and Trimix injections in the past. Is willing to reconsider trying 7 tablets of Sildenafil. PSA from 11/12/18 was 9.50 ng/mL, current PSA is within range of patient, will watch PSA. \par 4/15/19: The patient returned today and reported that his urination is satisfactory. Patient denies dysuria, gross hematuria, pushing or straining to urinate, urgency, or incontinence. Wakes up once during the night to urinate. Patient hasn't tried the Sildenafil that I prescribed during his last visit. 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 Today he states that his urination is satisfactory. Patient denies dysuria, gross hematuria, urgency or incontinence. He is feeling very good overall. He has a Hx of elevated PSA and his most recent PSA from 9/17/19 was 8.77 ng/mL. An MRI was completed last year and determined PIRADS 2. A biopsy was not necessary at that time. \par \par 03/11/2020: Patient presents today for a follow up. His PSA as of 3/9/2020 was 10.10 ng/mL which has elevated from prior PSA of 8.77 from 9/17/2019. His creatinine was 1.21 mg/dL. The patient denies chest pains and constipation.\par \par 02/17/2021: Patient presents today for follow up. Feels well. Wakes 0-1x at night to urinate. Urinates 2-3x during the day. Denies urinary urgency, pushing or straining, gross hematuria, or dysuria. No constipation. No hx or FHx of kidney stones. No FHx of prostate CA. 7/16/20 MRI prostate revealed score of PIRADS 2. Reviewed recent 2/10/21 lab results. PSA 12.20. H/o Crohn's disease, not on any steroids. PSHx lung wedge resection. Former smoker of one pack a day for more than 30 years, and quit 4 years ago. Wife had lung and breast CA and is now in remission. Feels well today and offers no new complaints. \par \par Knee chest position was used. Digital rectal exam found no suspicious rectal masses. No rectal mucosal lesions. Anal tone is normal. The prostate is non tender, with normal texture, discrete borders, and no nodules. It is a 30 gram transurethral resection size. No gross blood on examining fingers. \par \par 2/10/21 PSA is elevated at 12.20 which is typical for the patient. Has had two negative biopsies in the past and recent MRI was negative. \par \par The patient produced a urine sample which will be sent for urinalysis, urine cytology, and urine culture. \par RTO in 3 months for follow up. \par \par 09/23/2021: Patient presents today for a follow up visit. Had lab work done on 9/17/2021 in anticipation for today's appointment. Patient's PSA was 15.3 which is further elevated than his previous elevated PSA's. Last PSA before this one was 12.2 on 2/10/2021. Pt had an MRI of the prostate in 2020 which demonstrated a PIRADS-2. His PSA has been stable but rising slowly. All other lab work is normal. Patient received the 3rd Pfizer covid vaccine booster on 9/10/2021. \par \par The knee-chest position was utilized for the MERRICK.Digital rectal exam found no suspicious rectal masses. Anal tone is normal. The prostate is non tender, with normal texture, discrete borders, and no nodules. It is a 45 gram transurethral resection size. No gross blood on the examining finger. \par \par Reviewed and discussed lab results with the patient and his wife in detail. I prescribed the patient Sulfamethoxazole Trimethoprim 800-160 mg, twice a day. I advised the patient there is a chance of kidney insufficiency and an upset stomach. Patient will go for blood work to obtain a PSA after completing the prescription of antibiotics. If PSA remains elevated after antibiotics and doesn't go back down to 10-11, will send patient for another MRI of the prostate. \par \par Patient will have a telephone visit 3-4 days after blood drawn to review the results of his PSA. \par \par Preparation, in person office visit, and coordination of care: 25 minutes.

## 2021-09-23 NOTE — ADDENDUM
[FreeTextEntry1] : This note was authored by Kelly Gallagher working as a scribe for Dr.Gary Sanchez. I, Dr. Circket Sanchez have reviewed the content of this note and confirm it is true and accurate. I personally performed the history and physical examination and made all the decisions 09/23/2021.

## 2021-09-29 ENCOUNTER — RESULT CHARGE (OUTPATIENT)
Age: 76
End: 2021-09-29

## 2021-09-30 DIAGNOSIS — Z01.812 ENCOUNTER FOR PREPROCEDURAL LABORATORY EXAMINATION: ICD-10-CM

## 2021-10-01 ENCOUNTER — APPOINTMENT (OUTPATIENT)
Dept: PULMONOLOGY | Facility: CLINIC | Age: 76
End: 2021-10-01

## 2021-10-02 LAB — SARS-COV-2 N GENE NPH QL NAA+PROBE: NOT DETECTED

## 2021-10-04 ENCOUNTER — RX RENEWAL (OUTPATIENT)
Age: 76
End: 2021-10-04

## 2021-10-05 ENCOUNTER — APPOINTMENT (OUTPATIENT)
Dept: PULMONOLOGY | Facility: CLINIC | Age: 76
End: 2021-10-05
Payer: MEDICARE

## 2021-10-05 VITALS
SYSTOLIC BLOOD PRESSURE: 157 MMHG | HEART RATE: 83 BPM | DIASTOLIC BLOOD PRESSURE: 75 MMHG | RESPIRATION RATE: 14 BRPM | OXYGEN SATURATION: 96 % | TEMPERATURE: 97.2 F

## 2021-10-05 DIAGNOSIS — Z23 ENCOUNTER FOR IMMUNIZATION: ICD-10-CM

## 2021-10-05 PROCEDURE — ZZZZZ: CPT

## 2021-10-05 PROCEDURE — G0008: CPT

## 2021-10-05 PROCEDURE — 94010 BREATHING CAPACITY TEST: CPT

## 2021-10-05 PROCEDURE — 94729 DIFFUSING CAPACITY: CPT

## 2021-10-05 PROCEDURE — 90662 IIV NO PRSV INCREASED AG IM: CPT

## 2021-10-05 PROCEDURE — 94727 GAS DIL/WSHOT DETER LNG VOL: CPT

## 2021-10-05 PROCEDURE — 99214 OFFICE O/P EST MOD 30 MIN: CPT | Mod: 25

## 2021-10-07 PROBLEM — Z23 ENCOUNTER FOR IMMUNIZATION: Status: ACTIVE | Noted: 2019-09-28

## 2021-10-07 NOTE — PROCEDURE
[FreeTextEntry1] : PFT shows consistent interval improvement with significant residual impairment related to airways obstruction

## 2021-10-07 NOTE — ASSESSMENT
[FreeTextEntry1] : COPD status post exacerbation. Still has significant physiologic and clinical impairment. May use rescue inhaler prior to exertion if it improves his ability to ambulate

## 2021-10-10 LAB
PSA FREE FLD-MCNC: 11 %
PSA FREE SERPL-MCNC: 2.47 NG/ML
PSA SERPL-MCNC: 21.6 NG/ML

## 2021-10-11 ENCOUNTER — NON-APPOINTMENT (OUTPATIENT)
Age: 76
End: 2021-10-11

## 2021-10-13 ENCOUNTER — APPOINTMENT (OUTPATIENT)
Dept: UROLOGY | Facility: CLINIC | Age: 76
End: 2021-10-13
Payer: MEDICARE

## 2021-10-13 LAB
BACTERIA UR CULT: NORMAL
URINE CYTOLOGY: NORMAL

## 2021-10-13 PROCEDURE — 99443: CPT

## 2021-10-14 LAB
APPEARANCE: CLEAR
BACTERIA: NEGATIVE
BILIRUBIN URINE: NEGATIVE
BLOOD URINE: NEGATIVE
COLOR: NORMAL
GLUCOSE QUALITATIVE U: NEGATIVE
HYALINE CASTS: 0 /LPF
KETONES URINE: NEGATIVE
LEUKOCYTE ESTERASE URINE: NEGATIVE
MICROSCOPIC-UA: NORMAL
NITRITE URINE: NEGATIVE
PH URINE: 6
PROTEIN URINE: NEGATIVE
RED BLOOD CELLS URINE: 0 /HPF
SPECIFIC GRAVITY URINE: 1.01
SQUAMOUS EPITHELIAL CELLS: 0 /HPF
UROBILINOGEN URINE: NORMAL
WHITE BLOOD CELLS URINE: 0 /HPF

## 2021-10-16 NOTE — HISTORY OF PRESENT ILLNESS
[FreeTextEntry1] : Mr. Ramos is a 75 year old male presented with elevated PSA. PSA from 1/18/18 was 7.43 ng/mL and previously 8.11 ng/mL on 12/13/17. Patient had two prostate needle biopsies in the past and pathology on 3/9/10 showed benign prostatic tissue and focal mild chronic prostatitis. Patient has seen Dr. Manuel two years ago for erectile dysfunction and decreased libido.  Had tried Trimix injections and oral medications for ED in the past but notes they do not improved his erections. Patient's wife has lung cancer and breast cancer. Has Crohn's disease but is not currently active. Current smoker. \par 8/16/18: The patient returned and reported he finished his Bactrim course. PSA from 8/13/18 was 11.77 ng/mL and a percent free PSA of 16.1%. \par 9/5/18: The patient returned to discuss MRI results. MRI of the prostate from 8/18/18 findings showed no suspicious lesions for clinically significant prostate cancer. If total PSA today is over 15 ng/mL and percent free PSA below 14%, we will proceed with biopsy. \par 11/21/18: The patient returned and reported his urination is adequate. Patient denied dysuria, gross hematuria, urgency, or incontinence. Wakes up 1-2 times during the night to urinate. Complained of ED, has tried viagra and Trimix injections in the past. Is willing to reconsider trying 7 tablets of Sildenafil. PSA from 11/12/18 was 9.50 ng/mL, current PSA is within range of patient, will watch PSA. \par 4/15/19: The patient returned today and reported that his urination is satisfactory. Patient denies dysuria, gross hematuria, pushing or straining to urinate, urgency, or incontinence. Wakes up once during the night to urinate. Patient hasn't tried the Sildenafil that I prescribed during his last visit. 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 Today he states that his urination is satisfactory. Patient denies dysuria, gross hematuria, urgency or incontinence. He is feeling very good overall. He has a Hx of elevated PSA and his most recent PSA from 9/17/19 was 8.77 ng/mL. An MRI was completed last year and determined PIRADS 2. A biopsy was not necessary at that time. \par \par 03/11/2020: Patient presents today for a follow up. His PSA as of 3/9/2020 was 10.10 ng/mL which has elevated from prior PSA of 8.77 from 9/17/2019. His creatinine was 1.21mg/dL. The patient denies chest pains and constipation.\par \par 02/17/2021: Patient presents today for follow up. Feels well. Wakes 0-1x at night to urinate. Urinates 2-3x during the day. Denies urinary urgency, pushing or straining, gross hematuria, or dysuria. No constipation. No hx or FHx of kidney stones. No FHx of prostate CA. 7/16/20 MRI prostate revealed score of PIRADS 2. Reviewed recent 2/10/21 lab results. PSA 12.20. H/o Crohn's disease, not on any steroids. PSHx lung wedge resection. Former smoker of one pack a day for more than 30 years, and quit 4 years ago. Wife had lung and breast CA and is now in remission. Feels well today and offers no new complaints. \par \par 09/23/2021: Patient presents today for a follow up visit. Had lab work done on 9/17/2021 in anticipation for today's appointment. Patient's PSA was 15.3 which is further elevated than his previous elevated PSA's. Last PSA before this one was 12.2 on 2/10/2021. Pt had an MRI of the prostate in 2020 which demonstrated a PIRADS-2. His PSA has been stable but rising slowly. All other lab work is normal. Patient received the 3rd Pfizer covid vaccine booster on 9/10/2021. \par \par 10/13/2021: The patient gave permission for a telehealth visit. Patient is being followed for elevated PSA. Reviewed 10/12/21 urine results which were normal. Most recent PSA 10/8/21 of 21.6 has risen further from 15.3 on 9/17/21, which had increased from PSA on 2/10/21 was 12.2. Free PSA is 11%. Reviewed 7/16/20 prostate MRI which showed PIRADS 2. Urination is good. No pain or discomfort when he urinates or sits. Sexual function and erections are normal. No FHx of prostate cancer. Hx of lung cancer and is doing well.

## 2021-10-16 NOTE — REVIEW OF SYSTEMS
[Eyesight Problems] : eyesight problems [Nocturia] : nocturia [Erectile Dysfunction] : erectile dysfunction [Feeling Poorly] : not feeling poorly [Feeling Tired] : not feeling tired [Loss Of Hearing] : no hearing loss [Chest Pain] : no chest pain [Nosebleeds] : no nosebleeds [Cough] : no cough [Constipation] : no constipation [Dysuria] : no dysuria [Incontinence] : no incontinence [Hesitancy] : no urinary hesitancy [Testicular Pain] : no testicular pain [Depression] : no depression [Anxiety] : no anxiety [Easy Bleeding] : no tendency for easy bleeding [Easy Bruising] : no tendency for easy bruising

## 2021-10-16 NOTE — ADDENDUM
[FreeTextEntry1] : I, Julia Masonin, acted solely as a scribe for Dr. Cricket Sanchez on this date 10/13/2021.\par \par All medical record entries made by the Scribe were at my, Dr. Cricket Sanchez, direction and personally dictated by me on 10/13/2021. I have reviewed the chart and agree that the record accurately reflects my personal performance of the history, physical exam, assessment and plan.  I have also personally directed, reviewed and agreed with the chart.

## 2021-10-16 NOTE — ASSESSMENT
[FreeTextEntry1] : Mr. Ramos is a 75 year old male presented with elevated PSA. PSA from 1/18/18 was 7.43 ng/mL and previously 8.11 ng/mL on 12/13/17. Patient had two prostate needle biopsies in the past and pathology on 3/9/10 showed benign prostatic tissue and focal mild chronic prostatitis. Patient has seen Dr. Manuel two years ago for erectile dysfunction and decreased libido.  Had tried Trimix injections and oral medications for ED in the past but notes they do not improved his erections. Patient's wife has lung cancer and breast cancer. Has Crohn's disease but is not currently active. Current smoker. \par 8/16/18: The patient returned and reported he finished his Bactrim course. PSA from 8/13/18 was 11.77 ng/mL and a percent free PSA of 16.1%. \par 9/5/18: The patient returned to discuss MRI results. MRI of the prostate from 8/18/18 findings showed no suspicious lesions for clinically significant prostate cancer. If total PSA today is over 15 ng/mL and percent free PSA below 14%, we will proceed with biopsy. \par 11/21/18: The patient returned and reported his urination is adequate. Patient denied dysuria, gross hematuria, urgency, or incontinence. Wakes up 1-2 times during the night to urinate. Complained of ED, has tried viagra and Trimix injections in the past. Is willing to reconsider trying 7 tablets of Sildenafil. PSA from 11/12/18 was 9.50 ng/mL, current PSA is within range of patient, will watch PSA. \par 4/15/19: The patient returned today and reported that his urination is satisfactory. Patient denies dysuria, gross hematuria, pushing or straining to urinate, urgency, or incontinence. Wakes up once during the night to urinate. Patient hasn't tried the Sildenafil that I prescribed during his last visit. 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 Today he states that his urination is satisfactory. Patient denies dysuria, gross hematuria, urgency or incontinence. He is feeling very good overall. He has a Hx of elevated PSA and his most recent PSA from 9/17/19 was 8.77 ng/mL. An MRI was completed last year and determined PIRADS 2. A biopsy was not necessary at that time. \par \par 03/11/2020: Patient presents today for a follow up. His PSA as of 3/9/2020 was 10.10 ng/mL which has elevated from prior PSA of 8.77 from 9/17/2019. His creatinine was 1.21 mg/dL. The patient denies chest pains and constipation.\par \par 02/17/2021: Patient presents today for follow up. Feels well. Wakes 0-1x at night to urinate. Urinates 2-3x during the day. Denies urinary urgency, pushing or straining, gross hematuria, or dysuria. No constipation. No hx or FHx of kidney stones. No FHx of prostate CA. 7/16/20 MRI prostate revealed score of PIRADS 2. Reviewed recent 2/10/21 lab results. PSA 12.20. H/o Crohn's disease, not on any steroids. PSHx lung wedge resection. Former smoker of one pack a day for more than 30 years, and quit 4 years ago. Wife had lung and breast CA and is now in remission. Feels well today and offers no new complaints. \par \par 09/23/2021: Patient presents today for a follow up visit. Had lab work done on 9/17/2021 in anticipation for today's appointment. Patient's PSA was 15.3 which is further elevated than his previous elevated PSA's. Last PSA before this one was 12.2 on 2/10/2021. Pt had an MRI of the prostate in 2020 which demonstrated a PIRADS-2. His PSA has been stable but rising slowly. All other lab work is normal. Patient received the 3rd Pfizer covid vaccine booster on 9/10/2021. \par \par Reviewed and discussed lab results with the patient and his wife in detail. I prescribed the patient Sulfamethoxazole Trimethoprim 800-160 mg, twice a day. I advised the patient there is a chance of kidney insufficiency and an upset stomach. Patient will go for blood work to obtain a PSA after completing the prescription of antibiotics. If PSA remains elevated after antibiotics and doesn't go back down to 10-11, will send patient for another MRI of the prostate. \par \par 10/13/2021: The patient gave permission for a telehealth visit. Patient is being followed for elevated PSA. Reviewed 10/12/21 urine results which were normal. Most recent PSA 10/8/21 of 21.6 has risen further from 15.3 on 9/17/21, which had increased from PSA on 2/10/21 was 12.2. Free PSA is 11%. Reviewed 7/16/20 prostate MRI which showed PIRADS 2. Urination is good. No pain or discomfort when he urinates or sits. Sexual function and erections are normal. No FHx of prostate cancer. Hx of lung cancer and is doing well. \par \par Due to PSA rising further to 21.6 with free PSA of 11%, we will repeat prostate MRI. If there is an area suspicious for prostate cancer, we will biopsy. Even there are no suspicious lesions found, I recommend going for systematic prostate biopsy due to rapid rise of PSA. \par \par Patient has scheduled prostate MRI on 10/20/21. \par \par RTO or schedule telehealth visit to review MRI results. \par \par Preparation, telehealth visit, and coordination of care took: 30 minutes

## 2021-10-20 ENCOUNTER — APPOINTMENT (OUTPATIENT)
Dept: MRI IMAGING | Facility: IMAGING CENTER | Age: 76
End: 2021-10-20
Payer: MEDICARE

## 2021-10-20 ENCOUNTER — OUTPATIENT (OUTPATIENT)
Dept: OUTPATIENT SERVICES | Facility: HOSPITAL | Age: 76
LOS: 1 days | End: 2021-10-20
Payer: COMMERCIAL

## 2021-10-20 ENCOUNTER — RESULT REVIEW (OUTPATIENT)
Age: 76
End: 2021-10-20

## 2021-10-20 DIAGNOSIS — R97.20 ELEVATED PROSTATE SPECIFIC ANTIGEN [PSA]: ICD-10-CM

## 2021-10-20 DIAGNOSIS — Z98.890 OTHER SPECIFIED POSTPROCEDURAL STATES: Chronic | ICD-10-CM

## 2021-10-20 DIAGNOSIS — Z90.89 ACQUIRED ABSENCE OF OTHER ORGANS: Chronic | ICD-10-CM

## 2021-10-20 PROCEDURE — 76498P: CUSTOM | Mod: 26

## 2021-10-20 PROCEDURE — 76498 UNLISTED MR PROCEDURE: CPT

## 2021-10-20 PROCEDURE — A9585: CPT

## 2021-10-20 PROCEDURE — 72197 MRI PELVIS W/O & W/DYE: CPT

## 2021-10-20 PROCEDURE — 72197 MRI PELVIS W/O & W/DYE: CPT | Mod: 26

## 2021-11-01 ENCOUNTER — APPOINTMENT (OUTPATIENT)
Dept: UROLOGY | Facility: CLINIC | Age: 76
End: 2021-11-01
Payer: MEDICARE

## 2021-11-01 PROCEDURE — 99214 OFFICE O/P EST MOD 30 MIN: CPT

## 2021-11-03 ENCOUNTER — APPOINTMENT (OUTPATIENT)
Dept: CT IMAGING | Facility: CLINIC | Age: 76
End: 2021-11-03
Payer: MEDICARE

## 2021-11-03 ENCOUNTER — OUTPATIENT (OUTPATIENT)
Dept: OUTPATIENT SERVICES | Facility: HOSPITAL | Age: 76
LOS: 1 days | End: 2021-11-03
Payer: COMMERCIAL

## 2021-11-03 DIAGNOSIS — Z98.890 OTHER SPECIFIED POSTPROCEDURAL STATES: Chronic | ICD-10-CM

## 2021-11-03 DIAGNOSIS — Z00.8 ENCOUNTER FOR OTHER GENERAL EXAMINATION: ICD-10-CM

## 2021-11-03 DIAGNOSIS — Z90.89 ACQUIRED ABSENCE OF OTHER ORGANS: Chronic | ICD-10-CM

## 2021-11-03 PROCEDURE — 71250 CT THORAX DX C-: CPT | Mod: 26

## 2021-11-03 PROCEDURE — 71250 CT THORAX DX C-: CPT

## 2021-11-04 ENCOUNTER — APPOINTMENT (OUTPATIENT)
Dept: THORACIC SURGERY | Facility: CLINIC | Age: 76
End: 2021-11-04
Payer: MEDICARE

## 2021-11-04 VITALS
OXYGEN SATURATION: 95 % | WEIGHT: 141 LBS | RESPIRATION RATE: 16 BRPM | BODY MASS INDEX: 24.98 KG/M2 | HEIGHT: 63 IN | DIASTOLIC BLOOD PRESSURE: 64 MMHG | HEART RATE: 66 BPM | SYSTOLIC BLOOD PRESSURE: 170 MMHG | TEMPERATURE: 98 F

## 2021-11-04 PROCEDURE — 99214 OFFICE O/P EST MOD 30 MIN: CPT

## 2021-11-05 NOTE — ASSESSMENT
[FreeTextEntry1] : Mr. DEUCE SIDHU, 75 year old male, former smoker, w/ hx of HTN, HLD, COPD, Crohn's disease, Stg I T1aN0 Lung AdenoCA s/p JULIO segmentectomy on 2/4/19 by Dr. Billings at [facility], who referred by Dr. Timmy Vidales (GI) for hiatal hernia with chronic ulcerative esophagitis. \par \par Now 3.5 mo s/p EGD, laparoscopic, robotic-assisted, repair of hiatal hernia, Toupet fundoplication, repair of umbilical hernia on 7/12/21. Path revealed benign fibroconnective and adipose tissue, c/w hernia sac. \par \par I have reviewed the patient's medical records and diagnostic images at time of this office consultation and have made the following recommendation:\par 1. CT chest reviewed and explained to patient, small hiatal hernia on CT chest report, likely represents the wrap of hiatal hernia repair, patient is doing well, I recommended patient to RTC in 6 month with Barium esophagram.\par 2. Patient is f/u with Dr. Jose De Guzman (Hem/Onc) for lung cancer, I will review patient next CT chest as well. \par \par I personally performed the services described in the documentation, reviewed the documentation recorded by the scribe in my presence and it accurately and completely records my words and actions.\par \par I, Ilana Dolan NP, am scribing for and the presence of ASHLEY Verdugo, the following sections HISTORY OF PRESENT ILLNESS, PAST MEDICAL/FAMILY/SOCIAL HISTORY; REVIEW OF SYSTEMS; VITAL SIGNS; PHYSICAL EXAM; DISPOSITION. \par \par

## 2021-11-05 NOTE — HISTORY OF PRESENT ILLNESS
[FreeTextEntry1] : Mr. DEUCE SIDHU, 75 year old male, former smoker, w/ hx of HTN, HLD, COPD, Crohn's disease, Stg I T1aN0 Lung AdenoCA s/p JULIO segmentectomy on 2/4/19 by Dr. Billings at [facility], who referred by Dr. Timmy Vidales (GI) for hiatal hernia with chronic ulcerative esophagitis. \par \par CT Chest on 8/11/2020 showed calcified lung nodules; a stable 6mm LLL nodule (3:109); s/p LULobectomy; unremarkable abdomen.\par \par EGD on 08/19/2020. Path of EGJ bx revealed segment of inflammatory exudate. Cardia mucosa with mild to moderate chronic inflammation and vascular congestion. Path of esophagus bx revealed extensive ulcerative acute esophagitis with inflammatory exudate and regions of granulation tissue. Duodenal bx revealed moderate chronic active duodenitis. \par \par EGD on 11/11/2020. Path of duodenal bulb bx revealed duodenal mucosa with Brunner's gland hyperplasia. Peptic duodenitis. GEJ bx revealed squamocolumnar mucosa with acute and chronic inflammation. Esophageus bx revealed ulcerative esophagitis involving squamous epithelium. \par \par EGD on 3/24/21 by Dr. Timmy Vidales showed a 5 cm hiatal hernia; mild non-erosive gastritis; mild portal gastropathy. EG junction is irregular at 34cm w/ ulcerations extending to 32cm. Path showed chronic gastritis, negative H. Pylori; GE mucosa showed mild active chronic inflammation, suggestive of reflux disease; negative for intestinal metaplasia. Bx of esophageal ulcers at 32cm showed acute ulcerating to chronic inflammation, moderate to severe, w/ surface inflammatory exudate, no evidence of fungal organisms. Negative for HSV I/II and CMV.\par \par Manometry on 6/23/21 revealed no adequate peristalsis, 40% of swallows are supine and 60% are absent, Ineffective esophageal motility with 4.2 cm hiatal hernia. \par \par Barium esophagram on 7/8/21: showed moderate hiatal hernia with GE reflux. \par \par Now 3.5 mo s/p EGD, laparoscopic, robotic-assisted, repair of hiatal hernia, Toupet fundoplication, repair of umbilical hernia on 7/12/21. Path revealed benign fibroconnective and adipose tissue, c/w hernia sac. \par \par CT chest was ordered by Dr. De Guzman (Hem/Onc) due to hx of lung cancer. \par \par CT chest on 11/03/2021:\par - Status post left upper lobectomy. \par - Emphysema is present. The central airways are patent. \par -  An 8 mm left lower lobe nodule (3:105) is unchanged since 2016.\par - Small hiatal hernia.\par \par Patient is here today for a follow up. Patient is on regular diet, denies reflux, he is currently on Pantoprazole 20 mg BID, denies shortness of breath, cough, chest pain, fever, chills.

## 2021-11-05 NOTE — CONSULT LETTER
[Dear  ___] : Dear  [unfilled], [Courtesy Letter:] : I had the pleasure of seeing your patient, [unfilled], in my office today. [Please see my note below.] : Please see my note below. [Consult Closing:] : Thank you very much for allowing me to participate in the care of this patient.  If you have any questions, please do not hesitate to contact me. [Sincerely,] : Sincerely, [FreeTextEntry2] : Dr. Timmy Vidales (GI/Referring)\par Dr. Volodymyr Pearce (Pulm)\par Dr. Dwayne Bob (PCP) \par Dr. Jose De Guzman (Hem/Onc)  [FreeTextEntry3] : Mervin Silverman MD, MPH \par System Director of Thoracic Surgery \par Director of Comprehensive Lung and Foregut [place] \par Professor Cardiovascular & Thoracic Surgery  \par [employer] [facility]\par \par [facility]\par [address]\par Oncology [address]\[place] [address]\par Tel: [phone]\par Fax: [phone]\par

## 2021-11-06 NOTE — ASSESSMENT
[FreeTextEntry1] : Mr. Ramos is a 75 year old male presented with elevated PSA. PSA from 1/18/18 was 7.43 ng/mL and previously 8.11 ng/mL on 12/13/17. Patient had two prostate needle biopsies in the past and pathology on 3/9/10 showed benign prostatic tissue and focal mild chronic prostatitis. Patient has seen Dr. Manuel two years ago for erectile dysfunction and decreased libido.  Had tried Trimix injections and oral medications for ED in the past but notes they do not improved his erections. Patient's wife has lung cancer and breast cancer. Has Crohn's disease but is not currently active. Current smoker. \par 8/16/18: The patient returned and reported he finished his Bactrim course. PSA from 8/13/18 was 11.77 ng/mL and a percent free PSA of 16.1%. \par 9/5/18: The patient returned to discuss MRI results. MRI of the prostate from 8/18/18 findings showed no suspicious lesions for clinically significant prostate cancer. If total PSA today is over 15 ng/mL and percent free PSA below 14%, we will proceed with biopsy. \par 11/21/18: The patient returned and reported his urination is adequate. Patient denied dysuria, gross hematuria, urgency, or incontinence. Wakes up 1-2 times during the night to urinate. Complained of ED, has tried viagra and Trimix injections in the past. Is willing to reconsider trying 7 tablets of Sildenafil. PSA from 11/12/18 was 9.50 ng/mL, current PSA is within range of patient, will watch PSA. \par 4/15/19: The patient returned today and reported that his urination is satisfactory. Patient denies dysuria, gross hematuria, pushing or straining to urinate, urgency, or incontinence. Wakes up once during the night to urinate. Patient hasn't tried the Sildenafil that I prescribed during his last visit. 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 Today he states that his urination is satisfactory. Patient denies dysuria, gross hematuria, urgency or incontinence. He is feeling very good overall. He has a Hx of elevated PSA and his most recent PSA from 9/17/19 was 8.77 ng/mL. An MRI was completed last year and determined PIRADS 2. A biopsy was not necessary at that time. \par \par 03/11/2020: Patient presents today for a follow up. His PSA as of 3/9/2020 was 10.10 ng/mL which has elevated from prior PSA of 8.77 from 9/17/2019. His creatinine was 1.21 mg/dL. The patient denies chest pains and constipation.\par \par 02/17/2021: Patient presents today for follow up. Feels well. Wakes 0-1x at night to urinate. Urinates 2-3x during the day. Denies urinary urgency, pushing or straining, gross hematuria, or dysuria. No constipation. No hx or FHx of kidney stones. No FHx of prostate CA. 7/16/20 MRI prostate revealed score of PIRADS 2. Reviewed recent 2/10/21 lab results. PSA 12.20. H/o Crohn's disease, not on any steroids. PSHx lung wedge resection. Former smoker of one pack a day for more than 30 years, and quit 4 years ago. Wife had lung and breast CA and is now in remission. Feels well today and offers no new complaints. \par \par 09/23/2021: Patient presents today for a follow up visit. Had lab work done on 9/17/2021 in anticipation for today's appointment. Patient's PSA was 15.3 which is further elevated than his previous elevated PSA's. Last PSA before this one was 12.2 on 2/10/2021. Pt had an MRI of the prostate in 2020 which demonstrated a PIRADS-2. His PSA has been stable but rising slowly. All other lab work is normal. Patient received the 3rd Pfizer covid vaccine booster on 9/10/2021. \par \par 10/13/2021: The patient gave permission for a telehealth visit. Patient is being followed for elevated PSA. Reviewed 10/12/21 urine results which were normal. Most recent PSA 10/8/21 of 21.6 has risen further from 15.3 on 9/17/21, which had increased from PSA on 2/10/21 was 12.2. Free PSA is 11%. Reviewed 7/16/20 prostate MRI which showed PIRADS 2. Urination is good. No pain or discomfort when he urinates or sits. Sexual function and erections are normal. No FHx of prostate cancer. Hx of lung cancer and is doing well. \par \par 11/01/2021: Patient presents today for follow up. On 7/16/20, patient had prostate MRI showing 88 cc gland and PSA density of 0.1 ng/mL, PIRADS 2, and no MRI suspicious lesion. More recently on 10/20/21 prostate MRI, there is a new lesion, 80 cc gland, and increased PSA density to 0.3 ng/mL from the previous exam, PIRADS 3. PSA 10/8/21 was 21.6 with %free of 11%, where previously on 3/9/20, PSA was 10.10 and %free. Hx of left lung nodule followed by Dr. Pearce. No issues with urination currently. \par \par From 3/9/20 to 10/8/21, PSA has doubled, and %free PSA has now dropped to a concerning level. Based on the rise of the absolute value of PSA  and appearance of a new lesion on the prostate with increased PIRADS score from 2 to 3, I recommend fusion biopsy of the prostate. I explained the PIRADS scoring system and that patient sits in the minor risk category of about 30% chance of prostate cancer. I referred the patient to Dr. Teofilo Mojica for the procedure. No hx of stent, pacemaker, hx of stroke, or blood thinners. I explained that risks of hospitalization for infection is very low. There is a small risk of urinary retention in which he will be catheterized for a few days which would then be taken out. I explained that 80% of prostate cancers are low or medium grade and often do not need treatment and will be observed with PSA and MERRICK. \par \par I sent an email to Dr. Mojica referring the patient to him. \par \par RTO 1 week after prostate biopsy to review results. \par \par Preparation, in-person office visit, and coordination of care took: 30 minutes

## 2021-11-06 NOTE — PHYSICAL EXAM
[General Appearance - Well Developed] : well developed [General Appearance - Well Nourished] : well nourished [Normal Appearance] : normal appearance [Well Groomed] : well groomed [General Appearance - In No Acute Distress] : no acute distress [Abdomen Soft] : soft [Abdomen Tenderness] : non-tender [Costovertebral Angle Tenderness] : no ~M costovertebral angle tenderness [Edema] : no peripheral edema [] : no respiratory distress [Respiration, Rhythm And Depth] : normal respiratory rhythm and effort [Exaggerated Use Of Accessory Muscles For Inspiration] : no accessory muscle use [Oriented To Time, Place, And Person] : oriented to person, place, and time [Affect] : the affect was normal [Mood] : the mood was normal [Not Anxious] : not anxious [Normal Station and Gait] : the gait and station were normal for the patient's age [No Focal Deficits] : no focal deficits [FreeTextEntry1] : Wears glasses.

## 2021-11-06 NOTE — HISTORY OF PRESENT ILLNESS
[FreeTextEntry1] : Mr. Ramos is a 75 year old male presented with elevated PSA. PSA from 1/18/18 was 7.43 ng/mL and previously 8.11 ng/mL on 12/13/17. Patient had two prostate needle biopsies in the past and pathology on 3/9/10 showed benign prostatic tissue and focal mild chronic prostatitis. Patient has seen Dr. Manuel two years ago for erectile dysfunction and decreased libido.  Had tried Trimix injections and oral medications for ED in the past but notes they do not improved his erections. Patient's wife has lung cancer and breast cancer. Has Crohn's disease but is not currently active. Current smoker. \par 8/16/18: The patient returned and reported he finished his Bactrim course. PSA from 8/13/18 was 11.77 ng/mL and a percent free PSA of 16.1%. \par 9/5/18: The patient returned to discuss MRI results. MRI of the prostate from 8/18/18 findings showed no suspicious lesions for clinically significant prostate cancer. If total PSA today is over 15 ng/mL and percent free PSA below 14%, we will proceed with biopsy. \par 11/21/18: The patient returned and reported his urination is adequate. Patient denied dysuria, gross hematuria, urgency, or incontinence. Wakes up 1-2 times during the night to urinate. Complained of ED, has tried viagra and Trimix injections in the past. Is willing to reconsider trying 7 tablets of Sildenafil. PSA from 11/12/18 was 9.50 ng/mL, current PSA is within range of patient, will watch PSA. \par 4/15/19: The patient returned today and reported that his urination is satisfactory. Patient denies dysuria, gross hematuria, pushing or straining to urinate, urgency, or incontinence. Wakes up once during the night to urinate. Patient hasn't tried the Sildenafil that I prescribed during his last visit. 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 Today he states that his urination is satisfactory. Patient denies dysuria, gross hematuria, urgency or incontinence. He is feeling very good overall. He has a Hx of elevated PSA and his most recent PSA from 9/17/19 was 8.77 ng/mL. An MRI was completed last year and determined PIRADS 2. A biopsy was not necessary at that time. \par \par 03/11/2020: Patient presents today for a follow up. His PSA as of 3/9/2020 was 10.10 ng/mL which has elevated from prior PSA of 8.77 from 9/17/2019. His creatinine was 1.21mg/dL. The patient denies chest pains and constipation.\par \par 02/17/2021: Patient presents today for follow up. Feels well. Wakes 0-1x at night to urinate. Urinates 2-3x during the day. Denies urinary urgency, pushing or straining, gross hematuria, or dysuria. No constipation. No hx or FHx of kidney stones. No FHx of prostate CA. 7/16/20 MRI prostate revealed score of PIRADS 2. Reviewed recent 2/10/21 lab results. PSA 12.20. H/o Crohn's disease, not on any steroids. PSHx lung wedge resection. Former smoker of one pack a day for more than 30 years, and quit 4 years ago. Wife had lung and breast CA and is now in remission. Feels well today and offers no new complaints. \par \par 09/23/2021: Patient presents today for a follow up visit. Had lab work done on 9/17/2021 in anticipation for today's appointment. Patient's PSA was 15.3 which is further elevated than his previous elevated PSA's. Last PSA before this one was 12.2 on 2/10/2021. Pt had an MRI of the prostate in 2020 which demonstrated a PIRADS-2. His PSA has been stable but rising slowly. All other lab work is normal. Patient received the 3rd Pfizer covid vaccine booster on 9/10/2021. \par \par 10/13/2021: The patient gave permission for a telehealth visit. Patient is being followed for elevated PSA. Reviewed 10/12/21 urine results which were normal. Most recent PSA 10/8/21 of 21.6 has risen further from 15.3 on 9/17/21, which had increased from PSA on 2/10/21 was 12.2. Free PSA is 11%. Reviewed 7/16/20 prostate MRI which showed PIRADS 2. Urination is good. No pain or discomfort when he urinates or sits. Sexual function and erections are normal. No FHx of prostate cancer. Hx of lung cancer and is doing well. \par \par 11/01/2021: Patient presents today for follow up. On 7/16/20, patient had prostate MRI showing 88 cc gland and PSA density of 0.1 ng/mL, PIRADS 2, and no MRI suspicious lesion. More recently on 10/20/21 prostate MRI, there is a new lesion, 80 cc gland, and increased PSA density to 0.3 ng/mL from the previous exam, PIRADS 3. PSA 10/8/21 was 21.6 with %free of 11%, where previously on 3/9/20, PSA was 10.10 and %free. Hx of left lung nodule followed by Dr. Perace. No issues with urination currently.

## 2021-11-06 NOTE — ADDENDUM
[FreeTextEntry1] : I, Julia Masonin, acted solely as a scribe for Dr. Cricket Sanchez on this date 11/01/2021.\par \par All medical record entries made by the Scribe were at my, Dr. Cricket Sanchez, direction and personally dictated by me on 11/01/2021. I have reviewed the chart and agree that the record accurately reflects my personal performance of the history, physical exam, assessment and plan.  I have also personally directed, reviewed and agreed with the chart.

## 2021-11-29 ENCOUNTER — APPOINTMENT (OUTPATIENT)
Dept: UROLOGY | Facility: CLINIC | Age: 76
End: 2021-11-29

## 2021-12-03 ENCOUNTER — OUTPATIENT (OUTPATIENT)
Dept: OUTPATIENT SERVICES | Facility: HOSPITAL | Age: 76
LOS: 1 days | End: 2021-12-03

## 2021-12-03 DIAGNOSIS — R97.20 ELEVATED PROSTATE SPECIFIC ANTIGEN [PSA]: ICD-10-CM

## 2021-12-03 DIAGNOSIS — Z98.890 OTHER SPECIFIED POSTPROCEDURAL STATES: Chronic | ICD-10-CM

## 2021-12-03 DIAGNOSIS — Z90.89 ACQUIRED ABSENCE OF OTHER ORGANS: Chronic | ICD-10-CM

## 2021-12-08 ENCOUNTER — OUTPATIENT (OUTPATIENT)
Dept: OUTPATIENT SERVICES | Facility: HOSPITAL | Age: 76
LOS: 1 days | End: 2021-12-08
Payer: COMMERCIAL

## 2021-12-08 ENCOUNTER — APPOINTMENT (OUTPATIENT)
Dept: UROLOGY | Facility: CLINIC | Age: 76
End: 2021-12-08
Payer: MEDICARE

## 2021-12-08 VITALS — DIASTOLIC BLOOD PRESSURE: 73 MMHG | HEART RATE: 86 BPM | SYSTOLIC BLOOD PRESSURE: 179 MMHG

## 2021-12-08 VITALS — SYSTOLIC BLOOD PRESSURE: 160 MMHG | HEART RATE: 60 BPM | DIASTOLIC BLOOD PRESSURE: 80 MMHG | RESPIRATION RATE: 14 BRPM

## 2021-12-08 VITALS
SYSTOLIC BLOOD PRESSURE: 187 MMHG | WEIGHT: 141 LBS | RESPIRATION RATE: 17 BRPM | HEART RATE: 71 BPM | DIASTOLIC BLOOD PRESSURE: 93 MMHG | HEIGHT: 63 IN | BODY MASS INDEX: 24.98 KG/M2

## 2021-12-08 VITALS
SYSTOLIC BLOOD PRESSURE: 184 MMHG | HEART RATE: 63 BPM | RESPIRATION RATE: 14 BRPM | DIASTOLIC BLOOD PRESSURE: 94 MMHG | OXYGEN SATURATION: 96 %

## 2021-12-08 DIAGNOSIS — Z98.890 OTHER SPECIFIED POSTPROCEDURAL STATES: Chronic | ICD-10-CM

## 2021-12-08 DIAGNOSIS — R35.0 FREQUENCY OF MICTURITION: ICD-10-CM

## 2021-12-08 DIAGNOSIS — Z90.89 ACQUIRED ABSENCE OF OTHER ORGANS: Chronic | ICD-10-CM

## 2021-12-08 PROCEDURE — 76872 US TRANSRECTAL: CPT | Mod: 26

## 2021-12-08 PROCEDURE — 55700: CPT

## 2021-12-08 PROCEDURE — 76942 ECHO GUIDE FOR BIOPSY: CPT | Mod: 26,59

## 2021-12-08 PROCEDURE — 76872 US TRANSRECTAL: CPT

## 2021-12-08 PROCEDURE — 76942 ECHO GUIDE FOR BIOPSY: CPT | Mod: 59

## 2021-12-09 ENCOUNTER — NON-APPOINTMENT (OUTPATIENT)
Age: 76
End: 2021-12-09

## 2021-12-10 DIAGNOSIS — R97.20 ELEVATED PROSTATE SPECIFIC ANTIGEN [PSA]: ICD-10-CM

## 2021-12-13 LAB — CORE LAB BIOPSY: NORMAL

## 2021-12-22 ENCOUNTER — APPOINTMENT (OUTPATIENT)
Dept: UROLOGY | Facility: CLINIC | Age: 76
End: 2021-12-22
Payer: MEDICARE

## 2021-12-22 PROCEDURE — 99214 OFFICE O/P EST MOD 30 MIN: CPT

## 2021-12-25 NOTE — ADDENDUM
[FreeTextEntry1] : I, Julia Masonin, acted solely as a scribe for Dr. Cricket Sanchez on this date 12/22/2021.\par \par All medical record entries made by the Scribe were at my, Dr. Cricket Sanchez, direction and personally dictated by me on 12/22/2021. I have reviewed the chart and agree that the record accurately reflects my personal performance of the history, physical exam, assessment and plan.  I have also personally directed, reviewed and agreed with the chart.

## 2021-12-25 NOTE — ASSESSMENT
[FreeTextEntry1] : Mr. Ramos is a 76 year old male presented with elevated PSA. PSA from 1/18/18 was 7.43 ng/mL and previously 8.11 ng/mL on 12/13/17. Patient had two prostate needle biopsies in the past and pathology on 3/9/10 showed benign prostatic tissue and focal mild chronic prostatitis. Patient has seen Dr. Manuel two years ago for erectile dysfunction and decreased libido.  Had tried Trimix injections and oral medications for ED in the past but notes they do not improved his erections. Patient's wife has lung cancer and breast cancer. Has Crohn's disease but is not currently active. Current smoker. \par 8/16/18: The patient returned and reported he finished his Bactrim course. PSA from 8/13/18 was 11.77 ng/mL and a percent free PSA of 16.1%. \par 9/5/18: The patient returned to discuss MRI results. MRI of the prostate from 8/18/18 findings showed no suspicious lesions for clinically significant prostate cancer. If total PSA today is over 15 ng/mL and percent free PSA below 14%, we will proceed with biopsy. \par 11/21/18: The patient returned and reported his urination is adequate. Patient denied dysuria, gross hematuria, urgency, or incontinence. Wakes up 1-2 times during the night to urinate. Complained of ED, has tried viagra and Trimix injections in the past. Is willing to reconsider trying 7 tablets of Sildenafil. PSA from 11/12/18 was 9.50 ng/mL, current PSA is within range of patient, will watch PSA. \par 4/15/19: The patient returned today and reported that his urination is satisfactory. Patient denies dysuria, gross hematuria, pushing or straining to urinate, urgency, or incontinence. Wakes up once during the night to urinate. Patient hasn't tried the Sildenafil that I prescribed during his last visit. 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 Today he states that his urination is satisfactory. Patient denies dysuria, gross hematuria, urgency or incontinence. He is feeling very good overall. He has a Hx of elevated PSA and his most recent PSA from 9/17/19 was 8.77 ng/mL. An MRI was completed last year and determined PIRADS 2. A biopsy was not necessary at that time. \par \par 03/11/2020: Patient presents today for a follow up. His PSA as of 3/9/2020 was 10.10 ng/mL which has elevated from prior PSA of 8.77 from 9/17/2019. His creatinine was 1.21 mg/dL. The patient denies chest pains and constipation.\par \par 02/17/2021: Patient presents today for follow up. Feels well. Wakes 0-1x at night to urinate. Urinates 2-3x during the day. Denies urinary urgency, pushing or straining, gross hematuria, or dysuria. No constipation. No hx or FHx of kidney stones. No FHx of prostate CA. 7/16/20 MRI prostate revealed score of PIRADS 2. Reviewed recent 2/10/21 lab results. PSA 12.20. H/o Crohn's disease, not on any steroids. PSHx lung wedge resection. Former smoker of one pack a day for more than 30 years, and quit 4 years ago. Wife had lung and breast CA and is now in remission. Feels well today and offers no new complaints. \par \par 09/23/2021: Patient presents today for a follow up visit. Had lab work done on 9/17/2021 in anticipation for today's appointment. Patient's PSA was 15.3 which is further elevated than his previous elevated PSA's. Last PSA before this one was 12.2 on 2/10/2021. Pt had an MRI of the prostate in 2020 which demonstrated a PIRADS-2. His PSA has been stable but rising slowly. All other lab work is normal. Patient received the 3rd Pfizer covid vaccine booster on 9/10/2021. \par \par 10/13/2021: The patient gave permission for a telehealth visit. Patient is being followed for elevated PSA. Reviewed 10/12/21 urine results which were normal. Most recent PSA 10/8/21 of 21.6 has risen further from 15.3 on 9/17/21, which had increased from PSA on 2/10/21 was 12.2. Free PSA is 11%. Reviewed 7/16/20 prostate MRI which showed PIRADS 2. Urination is good. No pain or discomfort when he urinates or sits. Sexual function and erections are normal. No FHx of prostate cancer. Hx of lung cancer and is doing well. \par \par 11/01/2021: Patient presents today for follow up. On 7/16/20, patient had prostate MRI showing 88 cc gland and PSA density of 0.1 ng/mL, PIRADS 2, and no MRI suspicious lesion. More recently on 10/20/21 prostate MRI, there is a new lesion, 80 cc gland, and increased PSA density to 0.3 ng/mL from the previous exam, PIRADS 3. PSA 10/8/21 was 21.6 with %free of 11%, where previously on 3/9/20, PSA was 10.10 and %free. Hx of left lung nodule followed by Dr. Pearce. No issues with urination currently. \par \par 12/22/2021: Patient presents today for follow up. Accompanied by wife. Went for prostate biopsy on 12/8/21 with Dr. Teofilo Mojica and here to review results. Reviewed 12/8/21 prostate biopsy report showing all benign prostate tissue. Usually does not wake to urinate. Denies urinary urgency, straining to urinate, or dysuria. Stream is good. Never saw blood in urine before the biopsy. Denies blood in stool. Continues Finasteride 5mg once daily. Sexual desire is poor. On 9/17/21, total testosterone normal 341 and free testosterone low 3.5. Vitality is okay. No FHx of prostate cancer. Former smoker, quit in 2019 before he had lung surgery for hx of lung cancer. Has not had recurrence of lung cancer. \par \par Reviewed 12/8/21 prostate biopsy report. \par \par Continue Finasteride 5mg once daily. \par \par Due to low free testosterone, I will prescribe the patient Clomiphene 50mg once every other day. We will check hormone levels in 3 weeks. I informed the patient there is a risk of increasing Hct which we will monitor and adjust medication if needed. \par \par RTO in 4 weeks to review lab results. \par \par Preparation, in-person office visit, and coordination of care took: 30 minutes

## 2021-12-25 NOTE — HISTORY OF PRESENT ILLNESS
[FreeTextEntry1] : Mr. Ramos is a 76 year old male presented with elevated PSA. PSA from 1/18/18 was 7.43 ng/mL and previously 8.11 ng/mL on 12/13/17. Patient had two prostate needle biopsies in the past and pathology on 3/9/10 showed benign prostatic tissue and focal mild chronic prostatitis. Patient has seen Dr. Manuel two years ago for erectile dysfunction and decreased libido.  Had tried Trimix injections and oral medications for ED in the past but notes they do not improved his erections. Patient's wife has lung cancer and breast cancer. Has Crohn's disease but is not currently active. Current smoker. \par 8/16/18: The patient returned and reported he finished his Bactrim course. PSA from 8/13/18 was 11.77 ng/mL and a percent free PSA of 16.1%. \par 9/5/18: The patient returned to discuss MRI results. MRI of the prostate from 8/18/18 findings showed no suspicious lesions for clinically significant prostate cancer. If total PSA today is over 15 ng/mL and percent free PSA below 14%, we will proceed with biopsy. \par 11/21/18: The patient returned and reported his urination is adequate. Patient denied dysuria, gross hematuria, urgency, or incontinence. Wakes up 1-2 times during the night to urinate. Complained of ED, has tried viagra and Trimix injections in the past. Is willing to reconsider trying 7 tablets of Sildenafil. PSA from 11/12/18 was 9.50 ng/mL, current PSA is within range of patient, will watch PSA. \par 4/15/19: The patient returned today and reported that his urination is satisfactory. Patient denies dysuria, gross hematuria, pushing or straining to urinate, urgency, or incontinence. Wakes up once during the night to urinate. Patient hasn't tried the Sildenafil that I prescribed during his last visit. 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 Today he states that his urination is satisfactory. Patient denies dysuria, gross hematuria, urgency or incontinence. He is feeling very good overall. He has a Hx of elevated PSA and his most recent PSA from 9/17/19 was 8.77 ng/mL. An MRI was completed last year and determined PIRADS 2. A biopsy was not necessary at that time. \par \par 03/11/2020: Patient presents today for a follow up. His PSA as of 3/9/2020 was 10.10 ng/mL which has elevated from prior PSA of 8.77 from 9/17/2019. His creatinine was 1.21mg/dL. The patient denies chest pains and constipation.\par \par 02/17/2021: Patient presents today for follow up. Feels well. Wakes 0-1x at night to urinate. Urinates 2-3x during the day. Denies urinary urgency, pushing or straining, gross hematuria, or dysuria. No constipation. No hx or FHx of kidney stones. No FHx of prostate CA. 7/16/20 MRI prostate revealed score of PIRADS 2. Reviewed recent 2/10/21 lab results. PSA 12.20. H/o Crohn's disease, not on any steroids. PSHx lung wedge resection. Former smoker of one pack a day for more than 30 years, and quit 4 years ago. Wife had lung and breast CA and is now in remission. Feels well today and offers no new complaints. \par \par 09/23/2021: Patient presents today for a follow up visit. Had lab work done on 9/17/2021 in anticipation for today's appointment. Patient's PSA was 15.3 which is further elevated than his previous elevated PSA's. Last PSA before this one was 12.2 on 2/10/2021. Pt had an MRI of the prostate in 2020 which demonstrated a PIRADS-2. His PSA has been stable but rising slowly. All other lab work is normal. Patient received the 3rd Pfizer covid vaccine booster on 9/10/2021. \par \par 10/13/2021: The patient gave permission for a telehealth visit. Patient is being followed for elevated PSA. Reviewed 10/12/21 urine results which were normal. Most recent PSA 10/8/21 of 21.6 has risen further from 15.3 on 9/17/21, which had increased from PSA on 2/10/21 was 12.2. Free PSA is 11%. Reviewed 7/16/20 prostate MRI which showed PIRADS 2. Urination is good. No pain or discomfort when he urinates or sits. Sexual function and erections are normal. No FHx of prostate cancer. Hx of lung cancer and is doing well. \par \par 11/01/2021: Patient presents today for follow up. On 7/16/20, patient had prostate MRI showing 88 cc gland and PSA density of 0.1 ng/mL, PIRADS 2, and no MRI suspicious lesion. More recently on 10/20/21 prostate MRI, there is a new lesion, 80 cc gland, and increased PSA density to 0.3 ng/mL from the previous exam, PIRADS 3. PSA 10/8/21 was 21.6 with %free of 11%, where previously on 3/9/20, PSA was 10.10 and %free. Hx of left lung nodule followed by Dr. Pearce. No issues with urination currently. \par \par 12/22/2021: Patient presents today for follow up. Accompanied by wife. Went for prostate biopsy on 12/8/21 with Dr. Teofilo Mojica and here to review results. Reviewed 12/8/21 prostate biopsy report showing all benign prostate tissue. Usually does not wake to urinate. Denies urinary urgency, straining to urinate, or dysuria. Stream is good. Never saw blood in urine before the biopsy. Denies blood in stool. Continues Finasteride 5mg once daily. Sexual desire is poor. On 9/17/21, total testosterone normal 341 and free testosterone low 3.5. Vitality is okay. No FHx of prostate cancer. Former smoker, quit in 2019 before he had lung surgery for hx of lung cancer. Has not had recurrence of lung cancer.

## 2021-12-29 LAB — SARS-COV-2 N GENE NPH QL NAA+PROBE: NOT DETECTED

## 2022-01-03 ENCOUNTER — APPOINTMENT (OUTPATIENT)
Dept: INTERNAL MEDICINE | Facility: CLINIC | Age: 77
End: 2022-01-03
Payer: MEDICARE

## 2022-01-03 ENCOUNTER — NON-APPOINTMENT (OUTPATIENT)
Age: 77
End: 2022-01-03

## 2022-01-03 VITALS
OXYGEN SATURATION: 97 % | HEART RATE: 97 BPM | WEIGHT: 170 LBS | BODY MASS INDEX: 32.1 KG/M2 | TEMPERATURE: 98.2 F | HEIGHT: 61 IN | SYSTOLIC BLOOD PRESSURE: 122 MMHG | DIASTOLIC BLOOD PRESSURE: 80 MMHG

## 2022-01-03 VITALS
SYSTOLIC BLOOD PRESSURE: 140 MMHG | WEIGHT: 148 LBS | DIASTOLIC BLOOD PRESSURE: 70 MMHG | TEMPERATURE: 98.7 F | HEART RATE: 67 BPM | BODY MASS INDEX: 27.94 KG/M2 | OXYGEN SATURATION: 96 % | HEIGHT: 61 IN

## 2022-01-03 PROCEDURE — G0444 DEPRESSION SCREEN ANNUAL: CPT

## 2022-01-03 PROCEDURE — 36415 COLL VENOUS BLD VENIPUNCTURE: CPT

## 2022-01-03 PROCEDURE — 93000 ELECTROCARDIOGRAM COMPLETE: CPT | Mod: 59

## 2022-01-03 PROCEDURE — G0439: CPT

## 2022-01-03 RX ORDER — PREDNISONE 20 MG/1
20 TABLET ORAL
Qty: 10 | Refills: 0 | Status: DISCONTINUED | COMMUNITY
Start: 2021-08-12 | End: 2022-01-03

## 2022-01-03 RX ORDER — CIPROFLOXACIN HYDROCHLORIDE 500 MG/1
500 TABLET, FILM COATED ORAL TWICE DAILY
Qty: 14 | Refills: 0 | Status: DISCONTINUED | COMMUNITY
Start: 2021-08-11 | End: 2022-01-03

## 2022-01-03 RX ORDER — SULFAMETHOXAZOLE AND TRIMETHOPRIM 800; 160 MG/1; MG/1
800-160 TABLET ORAL
Qty: 28 | Refills: 0 | Status: DISCONTINUED | COMMUNITY
Start: 2021-09-18 | End: 2022-01-03

## 2022-01-03 NOTE — ASSESSMENT
[FreeTextEntry1] : discussed w pt\par \par reviewed updated hx, prostate biopsy with benign findings, chronically elevated PSA w BPH, continuing on finasteride, urology f/u as scheduled \par \par cont current rx \par \par cont f/u w Dr Sobeida PATEL, had EGd, colonoscopy last year. s/p surgery for repair of hiatal hernia w clinical improvement. cont PPI \par monitor mild anemia, has stopped Fe supplement \par \par doing well since lung cancer surgery, stopped smoking, regained weight. COPD is stable, following w pulm. encouraged to use inhalers regularly \par \par reviewed vaccinations\par UTD w COVID vaccines x 3 doses\par influenza vaccine, pneumococcals and Shingrix UTD  \par \par check routine labs as below \par \par continued completed smoking cessation\par \par RTO 6 months for f/u or earlier prn if any new concerns

## 2022-01-03 NOTE — PHYSICAL EXAM
[Well Nourished] : well nourished [Well Developed] : well developed [Well-Appearing] : well-appearing [Normal Voice/Communication] : normal voice/communication [Normal Sclera/Conjunctiva] : normal sclera/conjunctiva [PERRL] : pupils equal round and reactive to light [Normal Outer Ear/Nose] : the outer ears and nose were normal in appearance [Normal Oropharynx] : the oropharynx was normal [Normal TMs] : both tympanic membranes were normal [No JVD] : no jugular venous distention [No Lymphadenopathy] : no lymphadenopathy [Supple] : supple [Thyroid Normal, No Nodules] : the thyroid was normal and there were no nodules present [No Respiratory Distress] : no respiratory distress  [No Accessory Muscle Use] : no accessory muscle use [Clear to Auscultation] : lungs were clear to auscultation bilaterally [Normal Rate] : normal rate  [Regular Rhythm] : with a regular rhythm [Normal S1, S2] : normal S1 and S2 [No Murmur] : no murmur heard [No Carotid Bruits] : no carotid bruits [No Abdominal Bruit] : a ~M bruit was not heard ~T in the abdomen [No Varicosities] : no varicosities [No Edema] : there was no peripheral edema [No Palpable Aorta] : no palpable aorta [No Extremity Clubbing/Cyanosis] : no extremity clubbing/cyanosis [Soft] : abdomen soft [Non Tender] : non-tender [Non-distended] : non-distended [No Masses] : no abdominal mass palpated [No HSM] : no HSM [Normal Bowel Sounds] : normal bowel sounds [Declined Rectal Exam] : declined rectal exam [Normal Supraclavicular Nodes] : no supraclavicular lymphadenopathy [Normal Posterior Cervical Nodes] : no posterior cervical lymphadenopathy [Normal Anterior Cervical Nodes] : no anterior cervical lymphadenopathy [No Spinal Tenderness] : no spinal tenderness [No Joint Swelling] : no joint swelling [Grossly Normal Strength/Tone] : grossly normal strength/tone [No Rash] : no rash [Coordination Grossly Intact] : coordination grossly intact [No Focal Deficits] : no focal deficits [Normal Gait] : normal gait [Speech Grossly Normal] : speech grossly normal [Normal Affect] : the affect was normal [Alert and Oriented x3] : oriented to person, place, and time [Normal Mood] : the mood was normal [Normal Insight/Judgement] : insight and judgment were intact [No Acute Distress] : no acute distress [de-identified] : reduced BS b/l  [de-identified] : mildly reduced pedal pulses b/l

## 2022-01-03 NOTE — REVIEW OF SYSTEMS
[Hesitancy] : hesitancy [Nocturia] : nocturia [Negative] : Heme/Lymph [Dyspnea on Exertion] : dyspnea on exertion [Shortness Of Breath] : no shortness of breath [Wheezing] : no wheezing [Cough] : no cough [Dysuria] : no dysuria [Incontinence] : no incontinence [Hematuria] : no hematuria [Frequency] : no frequency

## 2022-01-03 NOTE — HISTORY OF PRESENT ILLNESS
[de-identified] : 77 y/o man presents for annual physical exam. he feels well overall currently, no recent illnesses. \par \par he had COVID vaccines x 3 doses and influenza vaccine \par \par history updated, reviewed workup for highly elevated PSA, following w Dr Sanchez and he underwent prostate bx w Dr Mojica recently with benign findings. he is continuing finasteride and will be monitoring PSA. no new urinary concerns. \par \par monitoring mild anemia\par s/p repair of hiatal hernia w Dr Herrera han significant improvement in clinical symptoms \par continues f/u w Dr Vidales [facility] for chronic GERD,  EGD showed esophageal ulceration. also had updated colonoscopy. he has maintained his weight \par \par COPD/emphysema stable, using inhalers only intermittently for maintenance, following w Dr Pearce \par s/p L lung adenocarcinoma resection w Dr Billings at [facility] without complications. \par he has remained completely abstinent from smoking since his surgery\par HTN controlled on rx \par BPH symptoms relatively controlled\par Crohns disease stable and asymptomatic on Lialda following w Dr Timmy Vidales [facility]\par

## 2022-01-03 NOTE — HEALTH RISK ASSESSMENT
[No] : In the past 12 months have you used drugs other than those required for medical reasons? No [No falls in past year] : Patient reported no falls in the past year [0] : 2) Feeling down, depressed, or hopeless: Not at all (0) [None] : None [Employed] : employed [] :  [Fully functional (bathing, dressing, toileting, transferring, walking, feeding)] : Fully functional (bathing, dressing, toileting, transferring, walking, feeding) [Fully functional (using the telephone, shopping, preparing meals, housekeeping, doing laundry, using] : Fully functional and needs no help or supervision to perform IADLs (using the telephone, shopping, preparing meals, housekeeping, doing laundry, using transportation, managing medications and managing finances) [Former] : Former [20 or more] : 20 or more [YearQuit] : 2019 [[id number]] : 0 [ColonoscopyDate] : 2021

## 2022-01-04 ENCOUNTER — APPOINTMENT (OUTPATIENT)
Dept: PULMONOLOGY | Facility: CLINIC | Age: 77
End: 2022-01-04
Payer: MEDICARE

## 2022-01-04 VITALS
HEART RATE: 65 BPM | OXYGEN SATURATION: 93 % | HEIGHT: 61 IN | WEIGHT: 148 LBS | BODY MASS INDEX: 27.94 KG/M2 | TEMPERATURE: 97.6 F

## 2022-01-04 VITALS — SYSTOLIC BLOOD PRESSURE: 140 MMHG | DIASTOLIC BLOOD PRESSURE: 80 MMHG

## 2022-01-04 DIAGNOSIS — G47.30 HYPERSOMNIA, UNSPECIFIED: ICD-10-CM

## 2022-01-04 DIAGNOSIS — G47.10 HYPERSOMNIA, UNSPECIFIED: ICD-10-CM

## 2022-01-04 PROCEDURE — 99213 OFFICE O/P EST LOW 20 MIN: CPT

## 2022-01-05 ENCOUNTER — OUTPATIENT (OUTPATIENT)
Dept: OUTPATIENT SERVICES | Facility: HOSPITAL | Age: 77
LOS: 1 days | End: 2022-01-05

## 2022-01-05 DIAGNOSIS — Z98.890 OTHER SPECIFIED POSTPROCEDURAL STATES: Chronic | ICD-10-CM

## 2022-01-05 DIAGNOSIS — Z90.89 ACQUIRED ABSENCE OF OTHER ORGANS: Chronic | ICD-10-CM

## 2022-01-06 NOTE — HISTORY OF PRESENT ILLNESS
[TextBox_4] : COPD f/u\par \par Denies SOB or cough; overall doing well no change in shortness of breath

## 2022-01-26 ENCOUNTER — NON-APPOINTMENT (OUTPATIENT)
Age: 77
End: 2022-01-26

## 2022-01-29 LAB
ALBUMIN SERPL ELPH-MCNC: 4.4 G/DL
ALP BLD-CCNC: 70 U/L
ALT SERPL-CCNC: 9 U/L
ANION GAP SERPL CALC-SCNC: 11 MMOL/L
AST SERPL-CCNC: 15 U/L
BASOPHILS # BLD AUTO: 0.02 K/UL
BASOPHILS NFR BLD AUTO: 0.4 %
BILIRUB SERPL-MCNC: 0.5 MG/DL
BUN SERPL-MCNC: 24 MG/DL
CALCIUM SERPL-MCNC: 9.6 MG/DL
CHLORIDE SERPL-SCNC: 102 MMOL/L
CO2 SERPL-SCNC: 28 MMOL/L
CREAT SERPL-MCNC: 1.46 MG/DL
DHEA-S SERPL-MCNC: 101 UG/DL
EOSINOPHIL # BLD AUTO: 0.14 K/UL
EOSINOPHIL NFR BLD AUTO: 2.9 %
FSH SERPL-MCNC: 14 IU/L
GLUCOSE SERPL-MCNC: 93 MG/DL
HCT VFR BLD CALC: 42.4 %
HGB BLD-MCNC: 12.6 G/DL
IMM GRANULOCYTES NFR BLD AUTO: 0.2 %
LH SERPL-ACNC: 13.6 IU/L
LYMPHOCYTES # BLD AUTO: 0.94 K/UL
LYMPHOCYTES NFR BLD AUTO: 19.2 %
MAN DIFF?: NORMAL
MCHC RBC-ENTMCNC: 28.6 PG
MCHC RBC-ENTMCNC: 29.7 GM/DL
MCV RBC AUTO: 96.4 FL
MONOCYTES # BLD AUTO: 0.51 K/UL
MONOCYTES NFR BLD AUTO: 10.4 %
NEUTROPHILS # BLD AUTO: 3.27 K/UL
NEUTROPHILS NFR BLD AUTO: 66.9 %
PLATELET # BLD AUTO: 284 K/UL
POTASSIUM SERPL-SCNC: 5.4 MMOL/L
PROGEST SERPL-MCNC: 0.2 NG/ML
PROLACTIN SERPL-MCNC: 7 NG/ML
PROT SERPL-MCNC: 6.8 G/DL
PSA FREE FLD-MCNC: 17 %
PSA FREE SERPL-MCNC: 1.65 NG/ML
PSA SERPL-MCNC: 9.93 NG/ML
RBC # BLD: 4.4 M/UL
RBC # FLD: 16.5 %
SHBG SERPL-SCNC: 25.4 NMOL/L
SODIUM SERPL-SCNC: 141 MMOL/L
TSH SERPL-ACNC: 1.61 UIU/ML
WBC # FLD AUTO: 4.89 K/UL

## 2022-01-31 LAB
ALBUMIN SERPL ELPH-MCNC: 4.4 G/DL
ALP BLD-CCNC: 74 U/L
ALT SERPL-CCNC: 11 U/L
ANION GAP SERPL CALC-SCNC: 10 MMOL/L
AST SERPL-CCNC: 16 U/L
BASOPHILS # BLD AUTO: 0.02 K/UL
BASOPHILS NFR BLD AUTO: 0.4 %
BILIRUB SERPL-MCNC: 0.4 MG/DL
BUN SERPL-MCNC: 25 MG/DL
CALCIUM SERPL-MCNC: 9.1 MG/DL
CHLORIDE SERPL-SCNC: 104 MMOL/L
CHOLEST SERPL-MCNC: 207 MG/DL
CO2 SERPL-SCNC: 26 MMOL/L
COVID-19 SPIKE DOMAIN ANTIBODY INTERPRETATION: POSITIVE
CREAT SERPL-MCNC: 1.22 MG/DL
EOSINOPHIL # BLD AUTO: 0.1 K/UL
EOSINOPHIL NFR BLD AUTO: 2.2 %
ESTIMATED AVERAGE GLUCOSE: 126 MG/DL
FERRITIN SERPL-MCNC: 17 NG/ML
GLUCOSE SERPL-MCNC: 96 MG/DL
HBA1C MFR BLD HPLC: 6 %
HCT VFR BLD CALC: 40.4 %
HDLC SERPL-MCNC: 80 MG/DL
HGB BLD-MCNC: 12.3 G/DL
IMM GRANULOCYTES NFR BLD AUTO: 0.2 %
IRON SATN MFR SERPL: 15 %
IRON SERPL-MCNC: 55 UG/DL
LDLC SERPL CALC-MCNC: 112 MG/DL
LYMPHOCYTES # BLD AUTO: 0.89 K/UL
LYMPHOCYTES NFR BLD AUTO: 19.4 %
MAN DIFF?: NORMAL
MCHC RBC-ENTMCNC: 29.4 PG
MCHC RBC-ENTMCNC: 30.4 GM/DL
MCV RBC AUTO: 96.7 FL
MONOCYTES # BLD AUTO: 0.43 K/UL
MONOCYTES NFR BLD AUTO: 9.4 %
NEUTROPHILS # BLD AUTO: 3.14 K/UL
NEUTROPHILS NFR BLD AUTO: 68.4 %
NONHDLC SERPL-MCNC: 127 MG/DL
PLATELET # BLD AUTO: 258 K/UL
POTASSIUM SERPL-SCNC: 4.7 MMOL/L
PROT SERPL-MCNC: 6.7 G/DL
RBC # BLD: 4.18 M/UL
RBC # FLD: 15.6 %
SARS-COV-2 AB SERPL IA-ACNC: >250 U/ML
SODIUM SERPL-SCNC: 141 MMOL/L
T4 FREE SERPL-MCNC: 1.3 NG/DL
TESTOST FREE SERPL-MCNC: 12.8 PG/ML
TESTOST SERPL-MCNC: 790 NG/DL
TIBC SERPL-MCNC: 359 UG/DL
TRIGL SERPL-MCNC: 73 MG/DL
TSH SERPL-ACNC: 1.31 UIU/ML
UIBC SERPL-MCNC: 304 UG/DL
WBC # FLD AUTO: 4.59 K/UL

## 2022-02-01 LAB — ANDROST SERPL-MCNC: 136 NG/DL

## 2022-02-03 ENCOUNTER — APPOINTMENT (OUTPATIENT)
Dept: UROLOGY | Facility: CLINIC | Age: 77
End: 2022-02-03
Payer: MEDICARE

## 2022-02-03 DIAGNOSIS — R53.83 OTHER FATIGUE: ICD-10-CM

## 2022-02-03 PROCEDURE — 99214 OFFICE O/P EST MOD 30 MIN: CPT

## 2022-02-05 NOTE — ASSESSMENT
[FreeTextEntry1] : Mr. Ramos is a 76 year old male presented with elevated PSA. PSA from 1/18/18 was 7.43 ng/mL and previously 8.11 ng/mL on 12/13/17. Patient had two prostate needle biopsies in the past and pathology on 3/9/10 showed benign prostatic tissue and focal mild chronic prostatitis. Patient has seen Dr. Manuel two years ago for erectile dysfunction and decreased libido.  Had tried Trimix injections and oral medications for ED in the past but notes they do not improved his erections. Patient's wife has lung cancer and breast cancer. Has Crohn's disease but is not currently active. Current smoker. \par 8/16/18: The patient returned and reported he finished his Bactrim course. PSA from 8/13/18 was 11.77 ng/mL and a percent free PSA of 16.1%. \par 9/5/18: The patient returned to discuss MRI results. MRI of the prostate from 8/18/18 findings showed no suspicious lesions for clinically significant prostate cancer. If total PSA today is over 15 ng/mL and percent free PSA below 14%, we will proceed with biopsy. \par 11/21/18: The patient returned and reported his urination is adequate. Patient denied dysuria, gross hematuria, urgency, or incontinence. Wakes up 1-2 times during the night to urinate. Complained of ED, has tried viagra and Trimix injections in the past. Is willing to reconsider trying 7 tablets of Sildenafil. PSA from 11/12/18 was 9.50 ng/mL, current PSA is within range of patient, will watch PSA. \par 4/15/19: The patient returned today and reported that his urination is satisfactory. Patient denies dysuria, gross hematuria, pushing or straining to urinate, urgency, or incontinence. Wakes up once during the night to urinate. Patient hasn't tried the Sildenafil that I prescribed during his last visit. 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 Today he states that his urination is satisfactory. Patient denies dysuria, gross hematuria, urgency or incontinence. He is feeling very good overall. He has a Hx of elevated PSA and his most recent PSA from 9/17/19 was 8.77 ng/mL. An MRI was completed last year and determined PIRADS 2. A biopsy was not necessary at that time. \par \par 03/11/2020: Patient presents today for a follow up. His PSA as of 3/9/2020 was 10.10 ng/mL which has elevated from prior PSA of 8.77 from 9/17/2019. His creatinine was 1.21 mg/dL. The patient denies chest pains and constipation.\par \par 02/17/2021: Patient presents today for follow up. Feels well. Wakes 0-1x at night to urinate. Urinates 2-3x during the day. Denies urinary urgency, pushing or straining, gross hematuria, or dysuria. No constipation. No hx or FHx of kidney stones. No FHx of prostate CA. 7/16/20 MRI prostate revealed score of PIRADS 2. Reviewed recent 2/10/21 lab results. PSA 12.20. H/o Crohn's disease, not on any steroids. PSHx lung wedge resection. Former smoker of one pack a day for more than 30 years, and quit 4 years ago. Wife had lung and breast CA and is now in remission. Feels well today and offers no new complaints. \par \par 09/23/2021: Patient presents today for a follow up visit. Had lab work done on 9/17/2021 in anticipation for today's appointment. Patient's PSA was 15.3 which is further elevated than his previous elevated PSA's. Last PSA before this one was 12.2 on 2/10/2021. Pt had an MRI of the prostate in 2020 which demonstrated a PIRADS-2. His PSA has been stable but rising slowly. All other lab work is normal. Patient received the 3rd Pfizer covid vaccine booster on 9/10/2021. \par \par 10/13/2021: The patient gave permission for a telehealth visit. Patient is being followed for elevated PSA. Reviewed 10/12/21 urine results which were normal. Most recent PSA 10/8/21 of 21.6 has risen further from 15.3 on 9/17/21, which had increased from PSA on 2/10/21 was 12.2. Free PSA is 11%. Reviewed 7/16/20 prostate MRI which showed PIRADS 2. Urination is good. No pain or discomfort when he urinates or sits. Sexual function and erections are normal. No FHx of prostate cancer. Hx of lung cancer and is doing well. \par \par 11/01/2021: Patient presents today for follow up. On 7/16/20, patient had prostate MRI showing 88 cc gland and PSA density of 0.1 ng/mL, PIRADS 2, and no MRI suspicious lesion. More recently on 10/20/21 prostate MRI, there is a new lesion, 80 cc gland, and increased PSA density to 0.3 ng/mL from the previous exam, PIRADS 3. PSA 10/8/21 was 21.6 with %free of 11%, where previously on 3/9/20, PSA was 10.10 and %free. Hx of left lung nodule followed by Dr. Pearce. No issues with urination currently. \par \par 12/22/2021: Patient presents today for follow up. Accompanied by wife. Went for prostate biopsy on 12/8/21 with Dr. Teofilo Mojica and here to review results. Reviewed 12/8/21 prostate biopsy report showing all benign prostate tissue. Usually does not wake to urinate. Denies urinary urgency, straining to urinate, or dysuria. Stream is good. Never saw blood in urine before the biopsy. Denies blood in stool. Continues Finasteride 5mg once daily. Sexual desire is poor. On 9/17/21, total testosterone normal 341 and free testosterone low 3.5. Vitality is okay. No FHx of prostate cancer. Former smoker, quit in 2019 before he had lung surgery for hx of lung cancer. Has not had recurrence of lung cancer. \par \par 02/03/2022: Patient presents today for a follow up visit. The patient is feeling well today, but not significantly improved. He was accompanied by his wife who reports is mood has been the same. He began taking Clomiphene 50 mg, one tablet every other day on 12/22/21, but stopped taking it on 1/25/22 because he did not refill it. On 1/27/22, his free testosterone was 12.8, total testosterone was elevated at 790, PSA was 9.93 ng/mL, which was within his normal limits. Went for prostate biopsy on 12/8/21 with Dr. Teofilo Mojica and here to review results. Reviewed 12/8/21 prostate biopsy report showing all benign prostate tissue. \par \par I advised him to refill his Clomiphene prescription and begin taking it today or tomorrow. I would like him to repeat his blood work in 6 weeks. If the testosterone is too elevated in 6 weeks, we will decrease the Clomiphene to 50 mg 3 times per week. I also recommend he begin exercising. \par \par The patient will schedule a Telehealth appointment 5-7 days after his next blood work. \par \par Preparation, in-person office visit, and coordination of care took: 30 minutes

## 2022-02-05 NOTE — HISTORY OF PRESENT ILLNESS
[FreeTextEntry1] : Mr. Ramos is a 76 year old male presented with elevated PSA. PSA from 1/18/18 was 7.43 ng/mL and previously 8.11 ng/mL on 12/13/17. Patient had two prostate needle biopsies in the past and pathology on 3/9/10 showed benign prostatic tissue and focal mild chronic prostatitis. Patient has seen Dr. Manuel two years ago for erectile dysfunction and decreased libido.  Had tried Trimix injections and oral medications for ED in the past but notes they do not improved his erections. Patient's wife has lung cancer and breast cancer. Has Crohn's disease but is not currently active. Current smoker. \par 8/16/18: The patient returned and reported he finished his Bactrim course. PSA from 8/13/18 was 11.77 ng/mL and a percent free PSA of 16.1%. \par 9/5/18: The patient returned to discuss MRI results. MRI of the prostate from 8/18/18 findings showed no suspicious lesions for clinically significant prostate cancer. If total PSA today is over 15 ng/mL and percent free PSA below 14%, we will proceed with biopsy. \par 11/21/18: The patient returned and reported his urination is adequate. Patient denied dysuria, gross hematuria, urgency, or incontinence. Wakes up 1-2 times during the night to urinate. Complained of ED, has tried viagra and Trimix injections in the past. Is willing to reconsider trying 7 tablets of Sildenafil. PSA from 11/12/18 was 9.50 ng/mL, current PSA is within range of patient, will watch PSA. \par 4/15/19: The patient returned today and reported that his urination is satisfactory. Patient denies dysuria, gross hematuria, pushing or straining to urinate, urgency, or incontinence. Wakes up once during the night to urinate. Patient hasn't tried the Sildenafil that I prescribed during his last visit. 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 Today he states that his urination is satisfactory. Patient denies dysuria, gross hematuria, urgency or incontinence. He is feeling very good overall. He has a Hx of elevated PSA and his most recent PSA from 9/17/19 was 8.77 ng/mL. An MRI was completed last year and determined PIRADS 2. A biopsy was not necessary at that time. \par \par 03/11/2020: Patient presents today for a follow up. His PSA as of 3/9/2020 was 10.10 ng/mL which has elevated from prior PSA of 8.77 from 9/17/2019. His creatinine was 1.21mg/dL. The patient denies chest pains and constipation.\par \par 02/17/2021: Patient presents today for follow up. Feels well. Wakes 0-1x at night to urinate. Urinates 2-3x during the day. Denies urinary urgency, pushing or straining, gross hematuria, or dysuria. No constipation. No hx or FHx of kidney stones. No FHx of prostate CA. 7/16/20 MRI prostate revealed score of PIRADS 2. Reviewed recent 2/10/21 lab results. PSA 12.20. H/o Crohn's disease, not on any steroids. PSHx lung wedge resection. Former smoker of one pack a day for more than 30 years, and quit 4 years ago. Wife had lung and breast CA and is now in remission. Feels well today and offers no new complaints. \par \par 09/23/2021: Patient presents today for a follow up visit. Had lab work done on 9/17/2021 in anticipation for today's appointment. Patient's PSA was 15.3 which is further elevated than his previous elevated PSA's. Last PSA before this one was 12.2 on 2/10/2021. Pt had an MRI of the prostate in 2020 which demonstrated a PIRADS-2. His PSA has been stable but rising slowly. All other lab work is normal. Patient received the 3rd Pfizer covid vaccine booster on 9/10/2021. \par \par 10/13/2021: The patient gave permission for a telehealth visit. Patient is being followed for elevated PSA. Reviewed 10/12/21 urine results which were normal. Most recent PSA 10/8/21 of 21.6 has risen further from 15.3 on 9/17/21, which had increased from PSA on 2/10/21 was 12.2. Free PSA is 11%. Reviewed 7/16/20 prostate MRI which showed PIRADS 2. Urination is good. No pain or discomfort when he urinates or sits. Sexual function and erections are normal. No FHx of prostate cancer. Hx of lung cancer and is doing well. \par \par 11/01/2021: Patient presents today for follow up. On 7/16/20, patient had prostate MRI showing 88 cc gland and PSA density of 0.1 ng/mL, PIRADS 2, and no MRI suspicious lesion. More recently on 10/20/21 prostate MRI, there is a new lesion, 80 cc gland, and increased PSA density to 0.3 ng/mL from the previous exam, PIRADS 3. PSA 10/8/21 was 21.6 with %free of 11%, where previously on 3/9/20, PSA was 10.10 and %free. Hx of left lung nodule followed by Dr. Pearce. No issues with urination currently. \par \par 12/22/2021: Patient presents today for follow up. Accompanied by wife. Went for prostate biopsy on 12/8/21 with Dr. Teofilo Mojica and here to review results. Reviewed 12/8/21 prostate biopsy report showing all benign prostate tissue. Usually does not wake to urinate. Denies urinary urgency, straining to urinate, or dysuria. Stream is good. Never saw blood in urine before the biopsy. Denies blood in stool. Continues Finasteride 5mg once daily. Sexual desire is poor. On 9/17/21, total testosterone normal 341 and free testosterone low 3.5. Vitality is okay. No FHx of prostate cancer. Former smoker, quit in 2019 before he had lung surgery for hx of lung cancer. Has not had recurrence of lung cancer. \par \par 02/03/2022: Patient presents today for a follow up visit. The patient is feeling well today, but not significantly improved. He was accompanied by his wife who reports is mood has been the same. He began taking Clomiphene 50 mg, one tablet every other day on 12/22/21, but stopped taking it on 1/25/22 because he did not refill it. On 1/27/22, his free testosterone was 12.8, total testosterone was elevated at 790, PSA was 9.93 ng/mL, which was within his normal limits. Went for prostate biopsy on 12/8/21 with Dr. Teofilo Mojica and here to review results. Reviewed 12/8/21 prostate biopsy report showing all benign prostate tissue.

## 2022-02-05 NOTE — ADDENDUM
[FreeTextEntry1] : I, Ewelina Belle, acted solely as a scribe for Dr. Cricket Sanchez on this date 02/03/2022.\par \par All medical record entries made by the Scribe were at my, Dr. Cricket Sanchez, direction and personally dictated by me on 02/03/2022. I have reviewed the chart and agree that the record accurately reflects my personal performance of the history, physical exam, assessment and plan. I have also personally directed, reviewed and agreed with the chart.

## 2022-02-12 LAB
ANDROSTANOLONE SERPL-MCNC: 14 NG/DL
DHEA SERPL-MCNC: 130 NG/DL

## 2022-03-20 LAB
ALBUMIN SERPL ELPH-MCNC: 4.3 G/DL
ALP BLD-CCNC: 66 U/L
ALT SERPL-CCNC: 9 U/L
ANDROST SERPL-MCNC: 137 NG/DL
ANION GAP SERPL CALC-SCNC: 12 MMOL/L
AST SERPL-CCNC: 14 U/L
BASOPHILS # BLD AUTO: 0.04 K/UL
BASOPHILS NFR BLD AUTO: 0.7 %
BILIRUB SERPL-MCNC: 0.4 MG/DL
BUN SERPL-MCNC: 18 MG/DL
CALCIUM SERPL-MCNC: 9.1 MG/DL
CHLORIDE SERPL-SCNC: 106 MMOL/L
CO2 SERPL-SCNC: 26 MMOL/L
CREAT SERPL-MCNC: 1.31 MG/DL
DHEA-S SERPL-MCNC: 102 UG/DL
EGFR: 56 ML/MIN/1.73M2
EOSINOPHIL # BLD AUTO: 0.13 K/UL
EOSINOPHIL NFR BLD AUTO: 2.3 %
ESTRADIOL SERPL-MCNC: 33 PG/ML
ESTROGEN SERPL-MCNC: 155 PG/ML
FSH SERPL-MCNC: 12.7 IU/L
GLUCOSE SERPL-MCNC: 131 MG/DL
HCT VFR BLD CALC: 43 %
HGB BLD-MCNC: 12.9 G/DL
IMM GRANULOCYTES NFR BLD AUTO: 0.2 %
LH SERPL-ACNC: 10.5 IU/L
LYMPHOCYTES # BLD AUTO: 1.08 K/UL
LYMPHOCYTES NFR BLD AUTO: 18.7 %
MAN DIFF?: NORMAL
MCHC RBC-ENTMCNC: 29.7 PG
MCHC RBC-ENTMCNC: 30 GM/DL
MCV RBC AUTO: 98.9 FL
MONOCYTES # BLD AUTO: 0.49 K/UL
MONOCYTES NFR BLD AUTO: 8.5 %
NEUTROPHILS # BLD AUTO: 4.02 K/UL
NEUTROPHILS NFR BLD AUTO: 69.6 %
PLATELET # BLD AUTO: 277 K/UL
POTASSIUM SERPL-SCNC: 4.8 MMOL/L
PROGEST SERPL-MCNC: 0.3 NG/ML
PROLACTIN SERPL-MCNC: 8.2 NG/ML
PROT SERPL-MCNC: 6.6 G/DL
PSA FREE FLD-MCNC: 20 %
PSA FREE SERPL-MCNC: 1.76 NG/ML
PSA SERPL-MCNC: 8.73 NG/ML
RBC # BLD: 4.35 M/UL
RBC # FLD: 16.2 %
SHBG SERPL-SCNC: 46.2 NMOL/L
SODIUM SERPL-SCNC: 144 MMOL/L
TESTOST FREE SERPL-MCNC: 8.8 PG/ML
TESTOST SERPL-MCNC: 528 NG/DL
WBC # FLD AUTO: 5.77 K/UL

## 2022-03-22 ENCOUNTER — APPOINTMENT (OUTPATIENT)
Dept: UROLOGY | Facility: CLINIC | Age: 77
End: 2022-03-22
Payer: MEDICARE

## 2022-03-22 DIAGNOSIS — G47.33 OBSTRUCTIVE SLEEP APNEA (ADULT) (PEDIATRIC): ICD-10-CM

## 2022-03-22 LAB — DHEA SERPL-MCNC: 103 NG/DL

## 2022-03-22 PROCEDURE — 99214 OFFICE O/P EST MOD 30 MIN: CPT | Mod: 95

## 2022-03-22 NOTE — PHYSICAL EXAM
[General Appearance - Well Developed] : well developed [General Appearance - Well Nourished] : well nourished [Normal Appearance] : normal appearance [Well Groomed] : well groomed [General Appearance - In No Acute Distress] : no acute distress [] : no rash [Oriented To Time, Place, And Person] : oriented to person, place, and time [Affect] : the affect was normal [Mood] : the mood was normal [Not Anxious] : not anxious [Normal Station and Gait] : the gait and station were normal for the patient's age

## 2022-03-26 LAB — ANDROSTANOLONE SERPL-MCNC: 8.8 NG/DL

## 2022-04-11 PROBLEM — Z11.59 SCREENING FOR VIRAL DISEASE: Status: RESOLVED | Noted: 2019-05-14 | Resolved: 2020-12-23

## 2022-04-11 PROBLEM — Z11.59 SCREENING FOR VIRAL DISEASE: Status: ACTIVE | Noted: 2020-12-23

## 2022-04-25 ENCOUNTER — RX RENEWAL (OUTPATIENT)
Age: 77
End: 2022-04-25

## 2022-04-26 PROBLEM — G47.33 OSA (OBSTRUCTIVE SLEEP APNEA): Status: ACTIVE | Noted: 2021-05-08

## 2022-04-26 NOTE — ASSESSMENT
[FreeTextEntry1] : Mr. Ramos is a 76 year old male presented with elevated PSA. PSA from 1/18/18 was 7.43 ng/mL and previously 8.11 ng/mL on 12/13/17. Patient had two prostate needle biopsies in the past and pathology on 3/9/10 showed benign prostatic tissue and focal mild chronic prostatitis. Patient has seen Dr. Manuel two years ago for erectile dysfunction and decreased libido.  Had tried Trimix injections and oral medications for ED in the past but notes they do not improved his erections. Patient's wife has lung cancer and breast cancer. Has Crohn's disease but is not currently active. Current smoker. \par 8/16/18: The patient returned and reported he finished his Bactrim course. PSA from 8/13/18 was 11.77 ng/mL and a percent free PSA of 16.1%. \par 9/5/18: The patient returned to discuss MRI results. MRI of the prostate from 8/18/18 findings showed no suspicious lesions for clinically significant prostate cancer. If total PSA today is over 15 ng/mL and percent free PSA below 14%, we will proceed with biopsy. \par 11/21/18: The patient returned and reported his urination is adequate. Patient denied dysuria, gross hematuria, urgency, or incontinence. Wakes up 1-2 times during the night to urinate. Complained of ED, has tried viagra and Trimix injections in the past. Is willing to reconsider trying 7 tablets of Sildenafil. PSA from 11/12/18 was 9.50 ng/mL, current PSA is within range of patient, will watch PSA. \par 4/15/19: The patient returned today and reported that his urination is satisfactory. Patient denies dysuria, gross hematuria, pushing or straining to urinate, urgency, or incontinence. Wakes up once during the night to urinate. Patient hasn't tried the Sildenafil that I prescribed during his last visit. 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 Today he states that his urination is satisfactory. Patient denies dysuria, gross hematuria, urgency or incontinence. He is feeling very good overall. He has a Hx of elevated PSA and his most recent PSA from 9/17/19 was 8.77 ng/mL. An MRI was completed last year and determined PIRADS 2. A biopsy was not necessary at that time. \par \par 03/11/2020: Patient presents today for a follow up. His PSA as of 3/9/2020 was 10.10 ng/mL which has elevated from prior PSA of 8.77 from 9/17/2019. His creatinine was 1.21 mg/dL. The patient denies chest pains and constipation.\par \par 02/17/2021: Patient presents today for follow up. Feels well. Wakes 0-1x at night to urinate. Urinates 2-3x during the day. Denies urinary urgency, pushing or straining, gross hematuria, or dysuria. No constipation. No hx or FHx of kidney stones. No FHx of prostate CA. 7/16/20 MRI prostate revealed score of PIRADS 2. Reviewed recent 2/10/21 lab results. PSA 12.20. H/o Crohn's disease, not on any steroids. PSHx lung wedge resection. Former smoker of one pack a day for more than 30 years, and quit 4 years ago. Wife had lung and breast CA and is now in remission. Feels well today and offers no new complaints. \par \par 09/23/2021: Patient presents today for a follow up visit. Had lab work done on 9/17/2021 in anticipation for today's appointment. Patient's PSA was 15.3 which is further elevated than his previous elevated PSA's. Last PSA before this one was 12.2 on 2/10/2021. Pt had an MRI of the prostate in 2020 which demonstrated a PIRADS-2. His PSA has been stable but rising slowly. All other lab work is normal. Patient received the 3rd Pfizer covid vaccine booster on 9/10/2021. \par \par 10/13/2021: The patient gave permission for a telehealth visit. Patient is being followed for elevated PSA. Reviewed 10/12/21 urine results which were normal. Most recent PSA 10/8/21 of 21.6 has risen further from 15.3 on 9/17/21, which had increased from PSA on 2/10/21 was 12.2. Free PSA is 11%. Reviewed 7/16/20 prostate MRI which showed PIRADS 2. Urination is good. No pain or discomfort when he urinates or sits. Sexual function and erections are normal. No FHx of prostate cancer. Hx of lung cancer and is doing well. \par \par 11/01/2021: Patient presents today for follow up. On 7/16/20, patient had prostate MRI showing 88 cc gland and PSA density of 0.1 ng/mL, PIRADS 2, and no MRI suspicious lesion. More recently on 10/20/21 prostate MRI, there is a new lesion, 80 cc gland, and increased PSA density to 0.3 ng/mL from the previous exam, PIRADS 3. PSA 10/8/21 was 21.6 with %free of 11%, where previously on 3/9/20, PSA was 10.10 and %free. Hx of left lung nodule followed by Dr. Pearce. No issues with urination currently. \par \par 12/22/2021: Patient presents today for follow up. Accompanied by wife. Went for prostate biopsy on 12/8/21 with Dr. Teofilo Mojica and here to review results. Reviewed 12/8/21 prostate biopsy report showing all benign prostate tissue. Usually does not wake to urinate. Denies urinary urgency, straining to urinate, or dysuria. Stream is good. Never saw blood in urine before the biopsy. Denies blood in stool. Continues Finasteride 5mg once daily. Sexual desire is poor. On 9/17/21, total testosterone normal 341 and free testosterone low 3.5. Vitality is okay. No FHx of prostate cancer. Former smoker, quit in 2019 before he had lung surgery for hx of lung cancer. Has not had recurrence of lung cancer. \par \par 02/03/2022: Patient presents today for a follow up visit. The patient is feeling well today, but not significantly improved. He was accompanied by his wife who reports is mood has been the same. He began taking Clomiphene 50 mg, one tablet every other day on 12/22/21, but stopped taking it on 1/25/22 because he did not refill it. On 1/27/22, his free testosterone was 12.8, total testosterone was elevated at 790, PSA was 9.93 ng/mL, which was within his normal limits. Went for prostate biopsy on 12/8/21 with Dr. Teofilo Mojica and here to review results. Reviewed 12/8/21 prostate biopsy report showing all benign prostate tissue. \par \par 03/22/2022: The patient gave permission for a telehealth visit. Calls to review lab work. Currently on clomiphene 50mg every other day. Feels better than before starting medication. Mood and energy are good. Sexual desire however has not changed. Erections are poor. Prostate biopsy 12/2021 found only benign prostate tissue. Reviewed 3/16/22 lab work which showed PSA 8.73 neg biopsy 12/2021, Hgb slightly low 12.9 but higher than previously, MCHC low 30, free testosterone normal 8.8, total testosterone normal 528, creatinine 1.31 which is improved and has fluctuated between 0.99 to 1.49 in the past, LH elevated 10.5, progesterone elevated 0.3, prolactin 8.2, FSH elevated 12.7, estradiol 33, DHEAS normal. Urination is good. Feels urination is better than before clomiphene. Nocturia x0-1 which is better than before. Continues finasteride 5mg once daily. \par \par Reviewed 3/16/22 lab work with patient in detail. Elevation in LH, progesterone, FSH expected on clomiphene. PSA coming down despite higher testosterone which is good indicating inflammation is resolving. Creatinine is improved. \par \par Continue clomiphene 50mg once every other day and finasteride 5mg one daily.\par \par I prescribed the patient tadalafil 20mg PRN 2-3 hours before sexual activity for ED. I informed the patient that they may experience tingling of the skin, headache, warm flushed feeling, heartburn, backache, and on rare occasion, visual or hearing disturbances. Informed the patient how to use [employer] to  tadalafil at a [employer] or [employer] for a discounted price. \par \par Repeat blood work in 2 months and schedule telehealth appointment to review results. \par \par Preparation, telehealth visit, and coordination of care took: 30 minutes

## 2022-04-26 NOTE — ADDENDUM
[FreeTextEntry1] : I, Julia Masonin, acted solely as a scribe for Dr. Cricket Sanchez on this date 03/22/2022.\par \par All medical record entries made by the Scribe were at my, Dr. Cricket Sanchez, direction and personally dictated by me on 03/22/2022. I have reviewed the chart and agree that the record accurately reflects my personal performance of the history, physical exam, assessment and plan.  I have also personally directed, reviewed and agreed with the chart.

## 2022-04-26 NOTE — HISTORY OF PRESENT ILLNESS
[Other Location: e.g. School (Enter Location, City,State)___] : at [unfilled], at the time of the visit. [FreeTextEntry1] : Mr. Ramos is a 76 year old male presented with elevated PSA. PSA from 1/18/18 was 7.43 ng/mL and previously 8.11 ng/mL on 12/13/17. Patient had two prostate needle biopsies in the past and pathology on 3/9/10 showed benign prostatic tissue and focal mild chronic prostatitis. Patient has seen Dr. Manuel two years ago for erectile dysfunction and decreased libido.  Had tried Trimix injections and oral medications for ED in the past but notes they do not improved his erections. Patient's wife has lung cancer and breast cancer. Has Crohn's disease but is not currently active. Current smoker. \par 8/16/18: The patient returned and reported he finished his Bactrim course. PSA from 8/13/18 was 11.77 ng/mL and a percent free PSA of 16.1%. \par 9/5/18: The patient returned to discuss MRI results. MRI of the prostate from 8/18/18 findings showed no suspicious lesions for clinically significant prostate cancer. If total PSA today is over 15 ng/mL and percent free PSA below 14%, we will proceed with biopsy. \par 11/21/18: The patient returned and reported his urination is adequate. Patient denied dysuria, gross hematuria, urgency, or incontinence. Wakes up 1-2 times during the night to urinate. Complained of ED, has tried viagra and Trimix injections in the past. Is willing to reconsider trying 7 tablets of Sildenafil. PSA from 11/12/18 was 9.50 ng/mL, current PSA is within range of patient, will watch PSA. \par 4/15/19: The patient returned today and reported that his urination is satisfactory. Patient denies dysuria, gross hematuria, pushing or straining to urinate, urgency, or incontinence. Wakes up once during the night to urinate. Patient hasn't tried the Sildenafil that I prescribed during his last visit. 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 Today he states that his urination is satisfactory. Patient denies dysuria, gross hematuria, urgency or incontinence. He is feeling very good overall. He has a Hx of elevated PSA and his most recent PSA from 9/17/19 was 8.77 ng/mL. An MRI was completed last year and determined PIRADS 2. A biopsy was not necessary at that time. \par \par 03/11/2020: Patient presents today for a follow up. His PSA as of 3/9/2020 was 10.10 ng/mL which has elevated from prior PSA of 8.77 from 9/17/2019. His creatinine was 1.21mg/dL. The patient denies chest pains and constipation.\par \par 02/17/2021: Patient presents today for follow up. Feels well. Wakes 0-1x at night to urinate. Urinates 2-3x during the day. Denies urinary urgency, pushing or straining, gross hematuria, or dysuria. No constipation. No hx or FHx of kidney stones. No FHx of prostate CA. 7/16/20 MRI prostate revealed score of PIRADS 2. Reviewed recent 2/10/21 lab results. PSA 12.20. H/o Crohn's disease, not on any steroids. PSHx lung wedge resection. Former smoker of one pack a day for more than 30 years, and quit 4 years ago. Wife had lung and breast CA and is now in remission. Feels well today and offers no new complaints. \par \par 09/23/2021: Patient presents today for a follow up visit. Had lab work done on 9/17/2021 in anticipation for today's appointment. Patient's PSA was 15.3 which is further elevated than his previous elevated PSA's. Last PSA before this one was 12.2 on 2/10/2021. Pt had an MRI of the prostate in 2020 which demonstrated a PIRADS-2. His PSA has been stable but rising slowly. All other lab work is normal. Patient received the 3rd Pfizer covid vaccine booster on 9/10/2021. \par \par 10/13/2021: The patient gave permission for a telehealth visit. Patient is being followed for elevated PSA. Reviewed 10/12/21 urine results which were normal. Most recent PSA 10/8/21 of 21.6 has risen further from 15.3 on 9/17/21, which had increased from PSA on 2/10/21 was 12.2. Free PSA is 11%. Reviewed 7/16/20 prostate MRI which showed PIRADS 2. Urination is good. No pain or discomfort when he urinates or sits. Sexual function and erections are normal. No FHx of prostate cancer. Hx of lung cancer and is doing well. \par \par 11/01/2021: Patient presents today for follow up. On 7/16/20, patient had prostate MRI showing 88 cc gland and PSA density of 0.1 ng/mL, PIRADS 2, and no MRI suspicious lesion. More recently on 10/20/21 prostate MRI, there is a new lesion, 80 cc gland, and increased PSA density to 0.3 ng/mL from the previous exam, PIRADS 3. PSA 10/8/21 was 21.6 with %free of 11%, where previously on 3/9/20, PSA was 10.10 and %free. Hx of left lung nodule followed by Dr. Pearce. No issues with urination currently. \par \par 12/22/2021: Patient presents today for follow up. Accompanied by wife. Went for prostate biopsy on 12/8/21 with Dr. Teofilo Mojica and here to review results. Reviewed 12/8/21 prostate biopsy report showing all benign prostate tissue. Usually does not wake to urinate. Denies urinary urgency, straining to urinate, or dysuria. Stream is good. Never saw blood in urine before the biopsy. Denies blood in stool. Continues Finasteride 5mg once daily. Sexual desire is poor. On 9/17/21, total testosterone normal 341 and free testosterone low 3.5. Vitality is okay. No FHx of prostate cancer. Former smoker, quit in 2019 before he had lung surgery for hx of lung cancer. Has not had recurrence of lung cancer. \par \par 02/03/2022: Patient presents today for a follow up visit. The patient is feeling well today, but not significantly improved. He was accompanied by his wife who reports is mood has been the same. He began taking Clomiphene 50 mg, one tablet every other day on 12/22/21, but stopped taking it on 1/25/22 because he did not refill it. On 1/27/22, his free testosterone was 12.8, total testosterone was elevated at 790, PSA was 9.93 ng/mL, which was within his normal limits. Went for prostate biopsy on 12/8/21 with Dr. Teofilo Mojica and here to review results. Reviewed 12/8/21 prostate biopsy report showing all benign prostate tissue. \par \par 03/22/2022: The patient gave permission for a telehealth visit. Calls to review lab work. Currently on clomiphene 50mg every other day. Feels better than before starting medication. Mood and energy are good. Sexual desire however has not changed. Erections are poor. Prostate biopsy 12/2021 found only benign prostate tissue. Reviewed 3/16/22 lab work which showed PSA 8.73 neg biopsy 12/2021, Hgb slightly low 12.9 but higher than previously, MCHC low 30, free testosterone normal 8.8, total testosterone normal 528, creatinine 1.31 which is improved and has fluctuated between 0.99 to 1.49 in the past, LH elevated 10.5, progesterone elevated 0.3, prolactin 8.2, FSH elevated 12.7, estradiol 33, DHEAS normal. Urination is good. Feels urination is better than before clomiphene. Nocturia x0-1 which is better than before. Continues finasteride 5mg once daily.

## 2022-05-05 ENCOUNTER — APPOINTMENT (OUTPATIENT)
Dept: RADIOLOGY | Facility: HOSPITAL | Age: 77
End: 2022-05-05
Payer: MEDICARE

## 2022-05-05 DIAGNOSIS — K21.9 GASTRO-ESOPHAGEAL REFLUX DISEASE WITHOUT ESOPHAGITIS: ICD-10-CM

## 2022-05-05 PROCEDURE — 74220 X-RAY XM ESOPHAGUS 1CNTRST: CPT | Mod: 26

## 2022-05-12 ENCOUNTER — APPOINTMENT (OUTPATIENT)
Dept: THORACIC SURGERY | Facility: CLINIC | Age: 77
End: 2022-05-12
Payer: MEDICARE

## 2022-05-12 VITALS
SYSTOLIC BLOOD PRESSURE: 182 MMHG | HEIGHT: 63 IN | HEART RATE: 67 BPM | OXYGEN SATURATION: 97 % | DIASTOLIC BLOOD PRESSURE: 83 MMHG | BODY MASS INDEX: 25.34 KG/M2 | WEIGHT: 143 LBS

## 2022-05-12 PROCEDURE — 99214 OFFICE O/P EST MOD 30 MIN: CPT

## 2022-05-15 NOTE — PHYSICAL EXAM
[Sclera] : the sclera and conjunctiva were normal [PERRL With Normal Accommodation] : pupils were equal in size, round, and reactive to light [Neck Appearance] : the appearance of the neck was normal [Neck Cervical Mass (___cm)] : no neck mass was observed [Respiration, Rhythm And Depth] : normal respiratory rhythm and effort [Heart Rate And Rhythm] : heart rate was normal and rhythm regular [Heart Sounds] : normal S1 and S2 [Chest Visual Inspection Thoracic Asymmetry] : no chest asymmetry [Examination Of The Chest] : the chest was normal in appearance [2+] : left 2+ [No Abnormalities] : the abdominal aorta was not enlarged and no bruit was heard [No Pulse Delay] : no pulse delay [Bowel Sounds] : normal bowel sounds [Abdomen Soft] : soft [Abdomen Tenderness] : non-tender [Cervical Lymph Nodes Enlarged Anterior Bilaterally] : anterior cervical [Cervical Lymph Nodes Enlarged Posterior Bilaterally] : posterior cervical [Supraclavicular Lymph Nodes Enlarged Bilaterally] : supraclavicular [No CVA Tenderness] : no ~M costovertebral angle tenderness [Abnormal Walk] : normal gait [Involuntary Movements] : no involuntary movements were seen [Musculoskeletal - Swelling] : no joint swelling seen [Skin Color & Pigmentation] : normal skin color and pigmentation [Skin Turgor] : normal skin turgor [] : no rash [No Focal Deficits] : no focal deficits [Sensation] : the sensory exam was normal to light touch and pinprick [Oriented To Time, Place, And Person] : oriented to person, place, and time [Affect] : the affect was normal [Mood] : the mood was normal [Fingers] :  capillary refill of the fingers was normal

## 2022-05-15 NOTE — CONSULT LETTER
[Dear  ___] : Dear  [unfilled], [Courtesy Letter:] : I had the pleasure of seeing your patient, [unfilled], in my office today. [Please see my note below.] : Please see my note below. [Consult Closing:] : Thank you very much for allowing me to participate in the care of this patient.  If you have any questions, please do not hesitate to contact me. [Sincerely,] : Sincerely, [FreeTextEntry2] : Dr. Timmy Vidales (GI/Referring)\par Dr. Volodymyr Pearce (Pulm)\par Dr. Dwayne Bob (PCP) \par Dr. Jose De Guzman (Hem/Onc)  [FreeTextEntry3] : Mervin Silverman MD, MPH \par System Director of Thoracic Surgery \par Director of Comprehensive Lung and Foregut [place] \par Professor Cardiovascular & Thoracic Surgery  \par [employer] [facility] [employer] [facility]\par \par [facility]\par [address]\par Oncology [address]\[place] [address]\par Tel: [phone]\par Fax: [phone]\par

## 2022-05-15 NOTE — ASSESSMENT
[FreeTextEntry1] : Mr. DEUCE SIDHU, 76 year old male, former smoker, w/ hx of HTN, HLD, COPD, Crohn's disease, Stg I T1aN0 Lung AdenoCA s/p JULIO segmentectomy on 2/4/19 by Dr. Billings at [facility], who referred by Dr. Timmy Vidales (GI) for hiatal hernia with chronic ulcerative esophagitis. \par \par EGD on 08/19/2020. Path of EGJ bx revealed segment of inflammatory exudate. Cardia mucosa with mild to moderate chronic inflammation and vascular congestion. Path of esophagus bx revealed extensive ulcerative acute esophagitis with inflammatory exudate and regions of granulation tissue. Duodenal bx revealed moderate chronic active duodenitis. \par \par EGD on 11/11/2020. Path of duodenal bulb bx revealed duodenal mucosa with Brunner's gland hyperplasia. Peptic duodenitis. GEJ bx revealed squamocolumnar mucosa with acute and chronic inflammation. Esophageus bx revealed ulcerative esophagitis involving squamous epithelium. \par \par EGD on 3/24/21 by Dr. Timmy Vidales showed a 5 cm hiatal hernia; mild non-erosive gastritis; mild portal gastropathy. EG junction is irregular at 34cm w/ ulcerations extending to 32cm. Path showed chronic gastritis, negative H. Pylori; GE mucosa showed mild active chronic inflammation, suggestive of reflux disease; negative for intestinal metaplasia. Bx of esophageal ulcers at 32cm showed acute ulcerating to chronic inflammation, moderate to severe, w/ surface inflammatory exudate, no evidence of fungal organisms. Negative for HSV I/II and CMV.\par \par Manometry on 6/23/21 revealed no adequate peristalsis, 40% of swallows are supine and 60% are absent, Ineffective esophageal motility with 4.2 cm hiatal hernia. \par \par Barium esophagram on 7/8/21: showed moderate hiatal hernia with GE reflux. \par \par Now 9 mo s/p EGD, laparoscopic, robotic-assisted, repair of hiatal hernia, Toupet fundoplication, repair of umbilical hernia on 7/12/21. Path revealed benign fibroconnective and adipose tissue, c/w hernia sac. \par \par Barium esophagram on 5/5/22:\par - small sliding hiatal hernia \par \par I have reviewed the patient's medical records and diagnostic images at time of this office consultation and have made the following recommendation:\par 1. Clinically doing well.\par 2. Barium esophagram in 6 months. \par 3. CT chest w/o contrast in 6 months. \par 4. RTC in 6 months with above tests. \par \par \par I personally performed the services described in the documentation, reviewed the documentation recorded by the scribe in my presence and it accurately and completely records my words and actions. \par \par I, Pari Pavon, CHERYL, am scribing for and the presence of ASHLEY Verdugo, the following sections HISTORY OF PRESENT ILLNESS, PAST MEDICAL/FAMILY/SOCIAL HISTORY; REVIEW OF SYSTEMS; VITAL SIGNS; PHYSICAL EXAM; DISPOSITION.\par

## 2022-05-15 NOTE — HISTORY OF PRESENT ILLNESS
[FreeTextEntry1] : Mr. DEUCE SIDHU, 76 year old male, former smoker, w/ hx of HTN, HLD, COPD, Crohn's disease, Stg I T1aN0 Lung AdenoCA s/p JULIO segmentectomy on 2/4/19 by Dr. Billings at [facility], who referred by Dr. Timmy Vidales (GI) for hiatal hernia with chronic ulcerative esophagitis. \par \par CT Chest on 8/11/2020 showed calcified lung nodules; a stable 6mm LLL nodule (3:109); s/p LULobectomy; unremarkable abdomen.\par \par EGD on 08/19/2020. Path of EGJ bx revealed segment of inflammatory exudate. Cardia mucosa with mild to moderate chronic inflammation and vascular congestion. Path of esophagus bx revealed extensive ulcerative acute esophagitis with inflammatory exudate and regions of granulation tissue. Duodenal bx revealed moderate chronic active duodenitis. \par \par EGD on 11/11/2020. Path of duodenal bulb bx revealed duodenal mucosa with Brunner's gland hyperplasia. Peptic duodenitis. GEJ bx revealed squamocolumnar mucosa with acute and chronic inflammation. Esophageus bx revealed ulcerative esophagitis involving squamous epithelium. \par \par EGD on 3/24/21 by Dr. Timmy Vidales showed a 5 cm hiatal hernia; mild non-erosive gastritis; mild portal gastropathy. EG junction is irregular at 34cm w/ ulcerations extending to 32cm. Path showed chronic gastritis, negative H. Pylori; GE mucosa showed mild active chronic inflammation, suggestive of reflux disease; negative for intestinal metaplasia. Bx of esophageal ulcers at 32cm showed acute ulcerating to chronic inflammation, moderate to severe, w/ surface inflammatory exudate, no evidence of fungal organisms. Negative for HSV I/II and CMV.\par \par Manometry on 6/23/21 revealed no adequate peristalsis, 40% of swallows are supine and 60% are absent, Ineffective esophageal motility with 4.2 cm hiatal hernia. \par \par Barium esophagram on 7/8/21: showed moderate hiatal hernia with GE reflux. \par \par Now 9 mo s/p EGD, laparoscopic, robotic-assisted, repair of hiatal hernia, Toupet fundoplication, repair of umbilical hernia on 7/12/21. Path revealed benign fibroconnective and adipose tissue, c/w hernia sac. \par \par CT chest was ordered by Dr. De Guzman (Hem/Onc) due to hx of lung cancer. \par \par CT chest on 11/03/2021:\par - Status post left upper lobectomy. \par - Emphysema is present. The central airways are patent. \par -  An 8 mm left lower lobe nodule (3:105) is unchanged since 2016.\par - Small hiatal hernia.\par \par Barium esophagram on 5/5/22:\par - small sliding hiatal hernia \par \par Pt presents today for follow up. He is on regular diet, tolerating well. Denies N/V/C/D, reflux, SOB, cough, CP, abd pain, weight loss. \par

## 2022-06-13 ENCOUNTER — RX RENEWAL (OUTPATIENT)
Age: 77
End: 2022-06-13

## 2022-06-30 ENCOUNTER — APPOINTMENT (OUTPATIENT)
Dept: PULMONOLOGY | Facility: CLINIC | Age: 77
End: 2022-06-30

## 2022-06-30 VITALS
OXYGEN SATURATION: 93 % | HEART RATE: 65 BPM | DIASTOLIC BLOOD PRESSURE: 84 MMHG | TEMPERATURE: 97.8 F | SYSTOLIC BLOOD PRESSURE: 172 MMHG

## 2022-06-30 PROCEDURE — 94010 BREATHING CAPACITY TEST: CPT

## 2022-06-30 PROCEDURE — 99213 OFFICE O/P EST LOW 20 MIN: CPT | Mod: 25

## 2022-06-30 RX ORDER — COVID-19 ANTIGEN TEST
KIT MISCELLANEOUS
Qty: 8 | Refills: 0 | Status: COMPLETED | COMMUNITY
Start: 2022-06-09

## 2022-06-30 NOTE — HISTORY OF PRESENT ILLNESS
[TextBox_4] : COPD f/u\par \par Denies SOB or cough; overall doing well no change in shortness of breath\par \par Continues to take Symbicort and Spiriva

## 2022-06-30 NOTE — PROCEDURE
[FreeTextEntry1] : CT Chest 11/21- Stable pulm nodule\par Spirometry demonstrates interval decline effort appears poor\par \par \par

## 2022-08-05 ENCOUNTER — APPOINTMENT (OUTPATIENT)
Dept: INTERNAL MEDICINE | Facility: CLINIC | Age: 77
End: 2022-08-05

## 2022-08-05 VITALS
DIASTOLIC BLOOD PRESSURE: 82 MMHG | BODY MASS INDEX: 26.58 KG/M2 | SYSTOLIC BLOOD PRESSURE: 170 MMHG | WEIGHT: 150 LBS | TEMPERATURE: 97.3 F | OXYGEN SATURATION: 97 % | HEART RATE: 64 BPM | HEIGHT: 63 IN

## 2022-08-05 PROCEDURE — 36415 COLL VENOUS BLD VENIPUNCTURE: CPT

## 2022-08-05 PROCEDURE — 99214 OFFICE O/P EST MOD 30 MIN: CPT | Mod: 25

## 2022-10-25 ENCOUNTER — APPOINTMENT (OUTPATIENT)
Dept: PULMONOLOGY | Facility: CLINIC | Age: 77
End: 2022-10-25

## 2022-10-25 VITALS — HEART RATE: 64 BPM | OXYGEN SATURATION: 94 %

## 2022-10-25 PROCEDURE — 99213 OFFICE O/P EST LOW 20 MIN: CPT | Mod: 25

## 2022-10-25 PROCEDURE — 94010 BREATHING CAPACITY TEST: CPT

## 2022-10-26 ENCOUNTER — RX RENEWAL (OUTPATIENT)
Age: 77
End: 2022-10-26

## 2022-10-27 NOTE — PROCEDURE
[FreeTextEntry1] : Spirometry demonstrates severe obstruction slight improvement from prior\par \par \par

## 2022-10-27 NOTE — ASSESSMENT
[FreeTextEntry1] : Overall status unchanged continue current medications\par \par Renew rescue inhaler

## 2022-11-03 ENCOUNTER — APPOINTMENT (OUTPATIENT)
Dept: RADIOLOGY | Facility: HOSPITAL | Age: 77
End: 2022-11-03

## 2022-11-03 ENCOUNTER — RESULT REVIEW (OUTPATIENT)
Age: 77
End: 2022-11-03

## 2022-11-03 ENCOUNTER — OUTPATIENT (OUTPATIENT)
Dept: OUTPATIENT SERVICES | Facility: HOSPITAL | Age: 77
LOS: 1 days | End: 2022-11-03

## 2022-11-03 DIAGNOSIS — Z98.890 OTHER SPECIFIED POSTPROCEDURAL STATES: Chronic | ICD-10-CM

## 2022-11-03 DIAGNOSIS — K44.9 DIAPHRAGMATIC HERNIA WITHOUT OBSTRUCTION OR GANGRENE: ICD-10-CM

## 2022-11-03 DIAGNOSIS — Z90.89 ACQUIRED ABSENCE OF OTHER ORGANS: Chronic | ICD-10-CM

## 2022-11-03 PROCEDURE — 74220 X-RAY XM ESOPHAGUS 1CNTRST: CPT | Mod: 26

## 2022-11-04 ENCOUNTER — OUTPATIENT (OUTPATIENT)
Dept: OUTPATIENT SERVICES | Facility: HOSPITAL | Age: 77
LOS: 1 days | End: 2022-11-04
Payer: COMMERCIAL

## 2022-11-04 ENCOUNTER — APPOINTMENT (OUTPATIENT)
Dept: CT IMAGING | Facility: CLINIC | Age: 77
End: 2022-11-04

## 2022-11-04 ENCOUNTER — RESULT REVIEW (OUTPATIENT)
Age: 77
End: 2022-11-04

## 2022-11-04 DIAGNOSIS — Z98.890 OTHER SPECIFIED POSTPROCEDURAL STATES: Chronic | ICD-10-CM

## 2022-11-04 DIAGNOSIS — Z00.8 ENCOUNTER FOR OTHER GENERAL EXAMINATION: ICD-10-CM

## 2022-11-04 DIAGNOSIS — Z90.89 ACQUIRED ABSENCE OF OTHER ORGANS: Chronic | ICD-10-CM

## 2022-11-04 PROCEDURE — 71250 CT THORAX DX C-: CPT | Mod: 26

## 2022-11-04 PROCEDURE — 71250 CT THORAX DX C-: CPT

## 2022-11-08 ENCOUNTER — TRANSCRIPTION ENCOUNTER (OUTPATIENT)
Age: 77
End: 2022-11-08

## 2022-11-08 NOTE — REVIEW OF SYSTEMS
[Abdominal Pain] : no abdominal pain [Nausea] : no nausea [Vomiting] : no vomiting [Heartburn] : heartburn [Dysuria] : no dysuria [Nocturia] : nocturia [Hematuria] : no hematuria [Negative] : Heme/Lymph

## 2022-11-08 NOTE — PHYSICAL EXAM
[No Acute Distress] : no acute distress [Normal Voice/Communication] : normal voice/communication [No Lymphadenopathy] : no lymphadenopathy [Normal] : normal rate, regular rhythm, normal S1 and S2 and no murmur heard [No Edema] : there was no peripheral edema [Normal Gait] : normal gait [Speech Grossly Normal] : speech grossly normal [Normal Affect] : the affect was normal [Alert and Oriented x3] : oriented to person, place, and time [Normal Mood] : the mood was normal

## 2022-11-08 NOTE — HISTORY OF PRESENT ILLNESS
[de-identified] : presents for f/u visit for review of progress, current rx, recent labs, f/u w specialists. he is doing well overall, no new concerns. weight stable. \par has reduced smoking. \par \par following w GI \par \par following w Dr Sanchez and he underwent prostate bx w Dr Mojica with benign findings for elevated PSA. he is continuing finasteride and will be monitoring PSA. no new urinary concerns. \par \par monitoring mild anemia\par s/p repair of hiatal hernia w Dr Herrera han significant improvement in clinical symptoms \par continues f/u w Dr Vidales GI for chronic GERD,  EGD showed esophageal ulceration. also had updated colonoscopy. he has maintained his weight \par \par COPD/emphysema stable, using inhalers only intermittently for maintenance, following w Dr Pearce \par s/p L lung adenocarcinoma resection w Dr Billings at [facility] without complications. \par he has remained completely abstinent from smoking since his surgery\par HTN controlled on rx \par BPH symptoms relatively controlled\par Crohns disease stable and asymptomatic on Lialda following w Dr Timmy Vidales GI\par

## 2022-11-08 NOTE — ASSESSMENT
[FreeTextEntry1] : discussed w pt \par reviewed updated hx , current rx and updates \par \par he is doing well \par \par reviewed vaccinations including COVID vaccines \par \par cont current rx\par \par check routine labs as below \par \par cont pulm f/u w Dr Pearce \par \par RTO 6 months for full physical exam or earlier prn if any new concerns

## 2022-11-10 ENCOUNTER — APPOINTMENT (OUTPATIENT)
Dept: RADIOLOGY | Facility: HOSPITAL | Age: 77
End: 2022-11-10

## 2022-11-17 ENCOUNTER — APPOINTMENT (OUTPATIENT)
Dept: THORACIC SURGERY | Facility: CLINIC | Age: 77
End: 2022-11-17

## 2022-11-17 VITALS
DIASTOLIC BLOOD PRESSURE: 90 MMHG | HEART RATE: 67 BPM | BODY MASS INDEX: 25.87 KG/M2 | HEIGHT: 63 IN | WEIGHT: 146 LBS | RESPIRATION RATE: 16 BRPM | OXYGEN SATURATION: 93 % | SYSTOLIC BLOOD PRESSURE: 168 MMHG

## 2022-11-17 DIAGNOSIS — R91.1 SOLITARY PULMONARY NODULE: ICD-10-CM

## 2022-11-17 PROCEDURE — 99214 OFFICE O/P EST MOD 30 MIN: CPT

## 2022-11-19 NOTE — CONSULT LETTER
[Dear  ___] : Dear  [unfilled], [Courtesy Letter:] : I had the pleasure of seeing your patient, [unfilled], in my office today. [Please see my note below.] : Please see my note below. [Consult Closing:] : Thank you very much for allowing me to participate in the care of this patient.  If you have any questions, please do not hesitate to contact me. [Sincerely,] : Sincerely, [FreeTextEntry2] : Dr. Timmy Vidales (GI/Referring)\par Dr. Volodymyr Pearce (Pulm)\par Dr. Dwayne Bob (PCP) \par Dr. Jose De Guzman (Hem/Onc)  [FreeTextEntry3] : Mervin Silverman MD, MPH \par System Director of Thoracic Surgery \par Director of Comprehensive Lung and Foregut [place] \par Professor Cardiovascular & Thoracic Surgery  \par [facility] [employer] [facility] at [facility]\par \par [facility]\par [address]\par Oncology [address]\[place] [address]\par Tel: [phone]\par Fax: [phone]\par

## 2022-11-19 NOTE — HISTORY OF PRESENT ILLNESS
[FreeTextEntry1] : Mr. DEUCE SIDHU, 77 year old male, former smoker, w/ hx of HTN, HLD, COPD, Crohn's disease, Stg I T1aN0 Lung AdenoCA s/p JULIO segmentectomy on 2/4/19 by Dr. Billings at [facility], who referred by Dr. Timmy Vidales (GI) for hiatal hernia with chronic ulcerative esophagitis. \par \par CT Chest on 8/11/2020 showed calcified lung nodules; a stable 6mm LLL nodule (3:109); s/p LULobectomy; unremarkable abdomen.\par \par EGD on 08/19/2020. Path of EGJ bx revealed segment of inflammatory exudate. Cardia mucosa with mild to moderate chronic inflammation and vascular congestion. Path of esophagus bx revealed extensive ulcerative acute esophagitis with inflammatory exudate and regions of granulation tissue. Duodenal bx revealed moderate chronic active duodenitis. \par \par EGD on 11/11/2020. Path of duodenal bulb bx revealed duodenal mucosa with Brunner's gland hyperplasia. Peptic duodenitis. GEJ bx revealed squamocolumnar mucosa with acute and chronic inflammation. Esophageus bx revealed ulcerative esophagitis involving squamous epithelium. \par \par EGD on 3/24/21 by Dr. Timmy Vidales showed a 5 cm hiatal hernia; mild non-erosive gastritis; mild portal gastropathy. EG junction is irregular at 34cm w/ ulcerations extending to 32cm. Path showed chronic gastritis, negative H. Pylori; GE mucosa showed mild active chronic inflammation, suggestive of reflux disease; negative for intestinal metaplasia. Bx of esophageal ulcers at 32cm showed acute ulcerating to chronic inflammation, moderate to severe, w/ surface inflammatory exudate, no evidence of fungal organisms. Negative for HSV I/II and CMV.\par \par Manometry on 6/23/21 revealed no adequate peristalsis, 40% of swallows are supine and 60% are absent, Ineffective esophageal motility with 4.2 cm hiatal hernia. \par \par Barium esophagram on 7/8/21: showed moderate hiatal hernia with GE reflux. \par \par Now 1 yr 3 mo s/p EGD, laparoscopic, robotic-assisted, repair of hiatal hernia, Toupet fundoplication, repair of umbilical hernia on 7/12/21. Path revealed benign fibroconnective and adipose tissue, c/w hernia sac. \par \par CT chest was ordered by Dr. De Guzman (Hem/Onc) due to hx of lung cancer. \par \par CT chest on 11/03/2021:\par - Status post left upper lobectomy. \par - Emphysema is present. The central airways are patent. \par -  An 8 mm left lower lobe nodule (3:105) is unchanged since 2016.\par - Small hiatal hernia.\par \par Barium esophagram on 5/5/22:\par - small sliding hiatal hernia \par \par Barium esophagram on 11/3/22:\par - there was delayed emptying into the stomach (10 minutes) at the gastroesophageal junction\par - sliding hiatal hernia\par \par CT Chest on 11/4/22:\par - post-op changes\par - stable 8 mm LLL\par \par Pt presents today for follow up. Pt reports well, denies acid reflux or dysphagia. Recent EGD with GI Dr. Vidales in Oct. \par

## 2022-11-19 NOTE — ASSESSMENT
[FreeTextEntry1] : Mr. DEUCE SIDHU, 77 year old male, former smoker, w/ hx of HTN, HLD, COPD, Crohn's disease, Stg I T1aN0 Lung AdenoCA s/p JULIO segmentectomy on 2/4/19 by Dr. Billings at [facility], who referred by Dr. Timmy Vidales (GI) for hiatal hernia with chronic ulcerative esophagitis. \par \par CT Chest on 8/11/2020 showed calcified lung nodules; a stable 6mm LLL nodule (3:109); s/p LULobectomy; unremarkable abdomen.\par \par EGD on 08/19/2020. Path of EGJ bx revealed segment of inflammatory exudate. Cardia mucosa with mild to moderate chronic inflammation and vascular congestion. Path of esophagus bx revealed extensive ulcerative acute esophagitis with inflammatory exudate and regions of granulation tissue. Duodenal bx revealed moderate chronic active duodenitis. \par \par EGD on 11/11/2020. Path of duodenal bulb bx revealed duodenal mucosa with Brunner's gland hyperplasia. Peptic duodenitis. GEJ bx revealed squamocolumnar mucosa with acute and chronic inflammation. Esophageus bx revealed ulcerative esophagitis involving squamous epithelium. \par \par EGD on 3/24/21 by Dr. Timmy Vidales showed a 5 cm hiatal hernia; mild non-erosive gastritis; mild portal gastropathy. EG junction is irregular at 34cm w/ ulcerations extending to 32cm. Path showed chronic gastritis, negative H. Pylori; GE mucosa showed mild active chronic inflammation, suggestive of reflux disease; negative for intestinal metaplasia. Bx of esophageal ulcers at 32cm showed acute ulcerating to chronic inflammation, moderate to severe, w/ surface inflammatory exudate, no evidence of fungal organisms. Negative for HSV I/II and CMV.\par \par Manometry on 6/23/21 revealed no adequate peristalsis, 40% of swallows are supine and 60% are absent, Ineffective esophageal motility with 4.2 cm hiatal hernia. \par \par Barium esophagram on 7/8/21: showed moderate hiatal hernia with GE reflux. \par \par Now 1 yr 3 mo s/p EGD, laparoscopic, robotic-assisted, repair of hiatal hernia, Toupet fundoplication, repair of umbilical hernia on 7/12/21. Path revealed benign fibroconnective and adipose tissue, c/w hernia sac. \par \par CT chest was ordered by Dr. De Guzman (Hem/Onc) due to hx of lung cancer. \par \par Barium esophagram on 11/3/22:\par - there was delayed emptying into the stomach (10 minutes) at the gastroesophageal junction\par - sliding hiatal hernia\par \par CT Chest on 11/4/22:\par - post-op changes\par - stable 8 mm LLL\par \par I have reviewed the patient's medical records and diagnostic images at time of this office consultation and have made the following recommendation:\par 1. CT reviewed, stable nodule, no evidence of recurrent for lung cancer. RTC in 1 yr with CT Chest \par 2. Barium reviewed, Pt is asymptomatic and doing well, RTC in 1 yr with Barium esophagram\par \par \par I, Dr. REEVES, ASHLEY AMARO, personally performed the evaluation and management (E/M) services for this established patient who presents today with (a) new problem(s)/exacerbation of (an) existing condition(s).  That E/M includes conducting the examination, assessing all new/exacerbated conditions, and establishing a new plan of care.  Today, my ACP, Kailee Morrell NP was here to observe my evaluation and management services for this new problem/exacerbated condition to be followed going forward.\par

## 2022-11-23 ENCOUNTER — APPOINTMENT (OUTPATIENT)
Dept: THORACIC SURGERY | Facility: CLINIC | Age: 77
End: 2022-11-23

## 2022-12-01 ENCOUNTER — APPOINTMENT (OUTPATIENT)
Dept: THORACIC SURGERY | Facility: CLINIC | Age: 77
End: 2022-12-01

## 2022-12-01 DIAGNOSIS — K22.10 ULCER OF ESOPHAGUS W/OUT BLEEDING: ICD-10-CM

## 2022-12-01 PROCEDURE — 99443: CPT

## 2022-12-03 PROBLEM — K22.10 ESOPHAGEAL ULCER: Status: ACTIVE | Noted: 2020-12-23

## 2022-12-05 NOTE — HISTORY OF PRESENT ILLNESS
[FreeTextEntry1] : Mr. DEUCE SIDHU, 77 year old male, former smoker, w/ hx of HTN, HLD, COPD, Crohn's disease, Stg I T1aN0 Lung AdenoCA s/p JULIO segmentectomy on 2/4/19 by Dr. Billings at [facility], who referred by Dr. Timmy Vidales (GI) for hiatal hernia with chronic ulcerative esophagitis. \par \par CT Chest on 8/11/2020 showed calcified lung nodules; a stable 6mm LLL nodule (3:109); s/p LULobectomy; unremarkable abdomen.\par \par EGD on 08/19/2020. Path of EGJ bx revealed segment of inflammatory exudate. Cardia mucosa with mild to moderate chronic inflammation and vascular congestion. Path of esophagus bx revealed extensive ulcerative acute esophagitis with inflammatory exudate and regions of granulation tissue. Duodenal bx revealed moderate chronic active duodenitis. \par \par EGD on 11/11/2020. Path of duodenal bulb bx revealed duodenal mucosa with Brunner's gland hyperplasia. Peptic duodenitis. GEJ bx revealed squamocolumnar mucosa with acute and chronic inflammation. Esophageus bx revealed ulcerative esophagitis involving squamous epithelium. \par \par EGD on 3/24/21 by Dr. Timmy Vidales showed a 5 cm hiatal hernia; mild non-erosive gastritis; mild portal gastropathy. EG junction is irregular at 34cm w/ ulcerations extending to 32cm. Path showed chronic gastritis, negative H. Pylori; GE mucosa showed mild active chronic inflammation, suggestive of reflux disease; negative for intestinal metaplasia. Bx of esophageal ulcers at 32cm showed acute ulcerating to chronic inflammation, moderate to severe, w/ surface inflammatory exudate, no evidence of fungal organisms. Negative for HSV I/II and CMV.\par \par Manometry on 6/23/21 revealed no adequate peristalsis, 40% of swallows are supine and 60% are absent, Ineffective esophageal motility with 4.2 cm hiatal hernia. \par \par Barium esophagram on 7/8/21: showed moderate hiatal hernia with GE reflux. \par \par Now 1 yr 3 mo s/p EGD, laparoscopic, robotic-assisted, repair of hiatal hernia, Toupet fundoplication, repair of umbilical hernia on 7/12/21. Path revealed benign fibroconnective and adipose tissue, c/w hernia sac. \par \par CT chest was ordered by Dr. De Guzman (Hem/Onc) due to hx of lung cancer. \par \par CT chest on 11/03/2021:\par - Status post left upper lobectomy. \par - Emphysema is present. The central airways are patent. \par -  An 8 mm left lower lobe nodule (3:105) is unchanged since 2016.\par - Small hiatal hernia.\par \par Barium esophagram on 5/5/22:\par - small sliding hiatal hernia \par \par Barium esophagram on 11/3/22:\par - there was delayed emptying into the stomach (10 minutes) at the gastroesophageal junction\par - sliding hiatal hernia\par \par CT Chest on 11/4/22:\par - post-op changes\par - stable 8 mm LLL\par \par Patient was just seen in office on 11/17/22, recommended patient to RTC in 1 yr with CT Chest and Barium esophagram.\par \par Patient is followed today via Telephonic visit; stating he discussed with Dr. Timmy Vidales and planned for an EGD. \par \par

## 2022-12-05 NOTE — CONSULT LETTER
[FreeTextEntry2] : Dr. Timmy Vidales (GI/Referring)\par Dr. Volodymyr Pearce (Pulm)\par Dr. wDayne Bob (PCP) \par Dr. Jose De Guzman (Hem/Onc)  [FreeTextEntry3] : Mervin Silverman MD, MPH \par System Director of Thoracic Surgery \par Director of Comprehensive Lung and Foregut [place] \par Professor Cardiovascular & Thoracic Surgery  \par [facility] [employer] at [facility]\par \par [facility]\par [address]\par Oncology [address]\[place] [address]\par Tel: [phone]\par Fax: [phone]\par

## 2022-12-05 NOTE — ASSESSMENT
[FreeTextEntry1] : Mr. DEUCE SIDHU, 77 year old male, former smoker, w/ hx of HTN, HLD, COPD, Crohn's disease, Stg I T1aN0 Lung AdenoCA s/p JULIO segmentectomy on 2/4/19 by Dr. Billings at [facility], who referred by Dr. Timmy Vidales (GI) for hiatal hernia with chronic ulcerative esophagitis. \par \par Now 1 yr 3 mo s/p EGD, laparoscopic, robotic-assisted, repair of hiatal hernia, Toupet fundoplication, repair of umbilical hernia on 7/12/21. Path revealed benign fibroconnective and adipose tissue, c/w hernia sac. \par \par Barium esophagram on 11/3/22:\par - there was delayed emptying into the stomach (10 minutes) at the gastroesophageal junction\par - sliding hiatal hernia\par \par CT Chest on 11/4/22:\par - post-op changes\par - stable 8 mm LLL\par \par I have reviewed the patient's medical records and diagnostic images at time of this office consultation and have made the following recommendation:\par 1. Discussed with pt's GI Dr. Vidales, most recent EGD showed a lot of acid and esophagitis and ulceration even though pt has no symptoms. I would like to take a look of myself, will schedule EGD on 12/22/22. \par \par \par I, Dr. REEVES, ASHLEY AMARO, personally performed the evaluation and management (E/M) services for this established patient who presents today with (a) new problem(s)/exacerbation of (an) existing condition(s).  That E/M includes conducting the examination, assessing all new/exacerbated conditions, and establishing a new plan of care.  Today, my ACP, Kailee Morrell NP was here to observe my evaluation and management services for this new problem/exacerbated condition to be followed going forward.\par \par \par \par

## 2022-12-20 ENCOUNTER — RX RENEWAL (OUTPATIENT)
Age: 77
End: 2022-12-20

## 2022-12-20 LAB — SARS-COV-2 N GENE NPH QL NAA+PROBE: DETECTED

## 2022-12-27 ENCOUNTER — RX RENEWAL (OUTPATIENT)
Age: 77
End: 2022-12-27

## 2022-12-31 ENCOUNTER — RX RENEWAL (OUTPATIENT)
Age: 77
End: 2022-12-31

## 2023-01-02 ENCOUNTER — TRANSCRIPTION ENCOUNTER (OUTPATIENT)
Age: 78
End: 2023-01-02

## 2023-01-18 ENCOUNTER — TRANSCRIPTION ENCOUNTER (OUTPATIENT)
Age: 78
End: 2023-01-18

## 2023-01-19 ENCOUNTER — APPOINTMENT (OUTPATIENT)
Dept: THORACIC SURGERY | Facility: HOSPITAL | Age: 78
End: 2023-01-19

## 2023-01-19 ENCOUNTER — RESULT REVIEW (OUTPATIENT)
Age: 78
End: 2023-01-19

## 2023-01-19 ENCOUNTER — OUTPATIENT (OUTPATIENT)
Dept: OUTPATIENT SERVICES | Facility: HOSPITAL | Age: 78
LOS: 1 days | Discharge: HOME | End: 2023-01-19
Payer: MEDICARE

## 2023-01-19 VITALS
TEMPERATURE: 98 F | WEIGHT: 143.08 LBS | SYSTOLIC BLOOD PRESSURE: 149 MMHG | HEART RATE: 65 BPM | RESPIRATION RATE: 18 BRPM | DIASTOLIC BLOOD PRESSURE: 70 MMHG | OXYGEN SATURATION: 95 % | HEIGHT: 64 IN

## 2023-01-19 VITALS
DIASTOLIC BLOOD PRESSURE: 64 MMHG | SYSTOLIC BLOOD PRESSURE: 131 MMHG | RESPIRATION RATE: 18 BRPM | HEART RATE: 62 BPM | OXYGEN SATURATION: 95 %

## 2023-01-19 DIAGNOSIS — Z98.890 OTHER SPECIFIED POSTPROCEDURAL STATES: Chronic | ICD-10-CM

## 2023-01-19 DIAGNOSIS — Z90.89 ACQUIRED ABSENCE OF OTHER ORGANS: Chronic | ICD-10-CM

## 2023-01-19 DIAGNOSIS — K22.0 ACHALASIA OF CARDIA: ICD-10-CM

## 2023-01-19 PROCEDURE — 43239 EGD BIOPSY SINGLE/MULTIPLE: CPT

## 2023-01-19 PROCEDURE — 88305 TISSUE EXAM BY PATHOLOGIST: CPT | Mod: 26

## 2023-01-19 NOTE — ASU PREOP CHECKLIST - NSWEIGHTCALCTOOLDRUG_GEN_A_CORE
Agree that pt should contact Dr. Ram for further advise. She should be drinking at least 8 glasses of water daily also.     used

## 2023-01-19 NOTE — ASU PATIENT PROFILE, ADULT - FALL HARM RISK - UNIVERSAL INTERVENTIONS
Bed in lowest position, wheels locked, appropriate side rails in place/Call bell, personal items and telephone in reach/Instruct patient to call for assistance before getting out of bed or chair/Non-slip footwear when patient is out of bed/[place] to call system/Physically safe environment - no spills, clutter or unnecessary equipment/Purposeful Proactive Rounding/Room/bathroom lighting operational, light cord in reach

## 2023-01-19 NOTE — BRIEF OPERATIVE NOTE - OPERATION/FINDINGS
EGD. Esophagitis seen in distal esophagus. Z-line 35cm, pinch at 39cm, 4cm hiatal hernia. Biopsy done at 35cm

## 2023-01-19 NOTE — BRIEF OPERATIVE NOTE - COMMENTS
JESSICA Maldonado, provided direct first assist support to the surgeon during this surgical procedure. My involvement included positioning, prepping and draping the patient prior to surgery, ensuring clear visibility and exposure for the surgeon by using instruments such as retractors and suction, closing surgical incisions and dressing wounds. As well as other tasks as directed by the surgeon.

## 2023-01-19 NOTE — ASU PATIENT PROFILE, ADULT - NSICDXPASTMEDICALHX_GEN_ALL_CORE_FT
PAST MEDICAL HISTORY:  BPH (benign prostatic hypertrophy)     Crohn's disease     Gastritis     Glaucoma     Hiatal hernia     Hyperlipidemia     Hypertension     Malignant neoplasm of lung Stg 1 T1aNO lung adeno ca 2/2019, denies chemo and radiation

## 2023-01-19 NOTE — ASU PATIENT PROFILE, ADULT - NSICDXPASTSURGICALHX_GEN_ALL_CORE_FT
PAST SURGICAL HISTORY:  H/O prostate biopsy 2008, 2010    History of lung biopsy 2019    S/P inguinal hernia repair left 2002, right 2016    S/P tonsillectomy 5/yo    Status post thoracotomy JULIO segmentectomy 2/4/2019

## 2023-01-19 NOTE — ASU PATIENT PROFILE, ADULT - ANESTHESIA, PREVIOUS REACTION, PROFILE
Patients last appointment 7/26/2021.   Patients next scheduled appointment   Future Appointments   Date Time Provider Katherine Woodson   1/28/2022  8:30 AM KRYS Rothman CNP [facility]   3/4/2022  8:00 AM KRYS Rothman CNP [facility] none

## 2023-01-23 LAB — SURGICAL PATHOLOGY STUDY: SIGNIFICANT CHANGE UP

## 2023-02-02 ENCOUNTER — APPOINTMENT (OUTPATIENT)
Dept: THORACIC SURGERY | Facility: CLINIC | Age: 78
End: 2023-02-02
Payer: MEDICARE

## 2023-02-02 VITALS
OXYGEN SATURATION: 95 % | DIASTOLIC BLOOD PRESSURE: 89 MMHG | HEIGHT: 63 IN | WEIGHT: 142 LBS | RESPIRATION RATE: 16 BRPM | BODY MASS INDEX: 25.16 KG/M2 | SYSTOLIC BLOOD PRESSURE: 166 MMHG | HEART RATE: 68 BPM

## 2023-02-02 PROCEDURE — 99215 OFFICE O/P EST HI 40 MIN: CPT

## 2023-02-03 NOTE — ASSESSMENT
[FreeTextEntry1] : Mr. DEUCE SIDHU, 77 year old male, former smoker, w/ hx of HTN, HLD, COPD, Crohn's disease, Stg I T1aN0 Lung AdenoCA s/p JULIO segmentectomy on 2/4/19 by Dr. Billings at [facility], who referred by Dr. Timmy Vidales (GI) for hiatal hernia with chronic ulcerative esophagitis. \par \par Now 1 yr 3 mo s/p EGD, laparoscopic, robotic-assisted, repair of hiatal hernia, Toupet fundoplication, repair of umbilical hernia on 7/12/21. Path revealed benign fibroconnective and adipose tissue, c/w hernia sac. \par \par Most recently, the patient even though without symptoms, he was found to have a small hiatal hernia on barium esophagram and EGD showed  that the patient had recurrent esophagitis and small recurrence of hiatal hernia.  The plan was to perform EGD for further evaluation.  \par \par EGD with biopsy on 1/19/23: \par - Z- line located at 34 cm from the incisor.   The diaphragmatic pinch is at 39 cm\par - Recurrent 5 cm hiatal hernia.  I entered the stomach\par -  Hill's grade IV hiatal hernia seen.  Multiple biopsies were performed at 34 cm.  There was evidence  of esophagitis seen that looked quite severe. Path - Esophagus at 34 cm: Squamous epithelium with scant focal parakeratosis. \par \par I have independently reviewed the medical records and imaging at the time of this office consultation, and discussed the following interpretations with the patient:\par - EGD results reviewed with patient. Recurrent hiatal hernia with severe esophagitis. \par - Discussed necessity of  EGD laparoscopic Robotic Assisted redo Hiatal hernia repair. Risks, benefits and alternatives explained to patient; All questions answered and patient agrees to proceed with surgery. Medical clearance required prior to procedure.  \par \par \par Recommendations reviewed with patient during this office visit, and all questions answered; Patient instructed on the importance of follow up and verbalizes understanding.\par \par I, ASHLEY Verdugo, personally performed the evaluation and management (E/M) services for this established patient. That E/M includes conducting the examination, assessing all new/exacerbated conditions, and establishing a new plan of care. Today, My ACP, Nedra Strong, was here to observe my evaluation and management services for this patient to be followed going forward.\par \par

## 2023-02-03 NOTE — HISTORY OF PRESENT ILLNESS
[FreeTextEntry1] : Mr. DEUCE SIDHU, 77 year old male, former smoker, w/ hx of HTN, HLD, COPD, Crohn's disease, Stg I T1aN0 Lung AdenoCA s/p JULIO segmentectomy on 2/4/19 by Dr. Billings at [facility], who referred by Dr. Timmy Vidales (GI) for hiatal hernia with chronic ulcerative esophagitis. \par \par CT Chest on 8/11/2020 showed calcified lung nodules; a stable 6mm LLL nodule (3:109); s/p LULobectomy; unremarkable abdomen.\par \par EGD on 08/19/2020. Path of EGJ bx revealed segment of inflammatory exudate. Cardia mucosa with mild to moderate chronic inflammation and vascular congestion. Path of esophagus bx revealed extensive ulcerative acute esophagitis with inflammatory exudate and regions of granulation tissue. Duodenal bx revealed moderate chronic active duodenitis. \par \par EGD on 11/11/2020. Path of duodenal bulb bx revealed duodenal mucosa with Brunner's gland hyperplasia. Peptic duodenitis. GEJ bx revealed squamocolumnar mucosa with acute and chronic inflammation. Esophageus bx revealed ulcerative esophagitis involving squamous epithelium. \par \par EGD on 3/24/21 by Dr. Timmy Vidales showed a 5 cm hiatal hernia; mild non-erosive gastritis; mild portal gastropathy. EG junction is irregular at 34cm w/ ulcerations extending to 32cm. Path showed chronic gastritis, negative H. Pylori; GE mucosa showed mild active chronic inflammation, suggestive of reflux disease; negative for intestinal metaplasia. Bx of esophageal ulcers at 32cm showed acute ulcerating to chronic inflammation, moderate to severe, w/ surface inflammatory exudate, no evidence of fungal organisms. Negative for HSV I/II and CMV.\par \par Manometry on 6/23/21 revealed no adequate peristalsis, 40% of swallows are supine and 60% are absent, Ineffective esophageal motility with 4.2 cm hiatal hernia. \par \par Barium esophagram on 7/8/21: showed moderate hiatal hernia with GE reflux. \par \par Now 1 yr 3 mo s/p EGD, laparoscopic, robotic-assisted, repair of hiatal hernia, Toupet fundoplication, repair of umbilical hernia on 7/12/21. Path revealed benign fibroconnective and adipose tissue, c/w hernia sac. \par \par CT chest was ordered by Dr. De Guzman (Hem/Onc) due to hx of lung cancer. \par \par CT chest on 11/03/2021:\par - Status post left upper lobectomy. \par - Emphysema is present. The central airways are patent. \par -  An 8 mm left lower lobe nodule (3:105) is unchanged since 2016.\par - Small hiatal hernia.\par \par Barium esophagram on 5/5/22:\par - small sliding hiatal hernia \par \par Barium esophagram on 11/3/22:\par - there was delayed emptying into the stomach (10 minutes) at the gastroesophageal junction\par - sliding hiatal hernia\par \par CT Chest on 11/4/22:\par - post-op changes\par - stable 8 mm LLL\par \par 12/1/22: Discussed with pt's GI Dr. Vidales, most recent EGD showed a lot of acid and esophagitis and ulceration even though pt has no symptoms. I would like to take a look of myself, will schedule EGD on 12/22/22. \par \par EGD with biopsy on 1/19/23: \par - Z- line located at 34 cm from the incisor.   The diaphragmatic pinch is at 39 cm\par - Recurrent 5 cm hiatal hernia.  I entered the stomach\par -  Hill's grade IV hiatal hernia seen.  Multiple biopsies were performed at 34 cm.  There was evidence  of esophagitis seen that looked quite severe. Path - Esophagus at 34 cm: Squamous epithelium with scant focal parakeratosis. \par \par Here today for follow up. Today endorses tolerating a regular diet. No heartburn, bloating, or dysphagia. Today, patient denies worsening SOB, chest pain, cough, hemoptysis, fever, chills, night sweats, lightheadedness or dizziness.\par \par \par \par \par \par

## 2023-02-03 NOTE — CONSULT LETTER
[Dear  ___] : Dear  [unfilled], [Consult Letter:] : I had the pleasure of evaluating your patient, [unfilled]. [Please see my note below.] : Please see my note below. [Consult Closing:] : Thank you very much for allowing me to participate in the care of this patient.  If you have any questions, please do not hesitate to contact me. [Sincerely,] : Sincerely, [FreeTextEntry2] : Dr. Timmy Vidales (GI/Referring)\par Dr. Volodymyr Pearce (Pulm)\par Dr. Dwayne Bob (PCP) \par Dr. Jose De Guzman (Hem/Onc)  [FreeTextEntry3] : Mervin Silverman MD, MPH \par System Director of Thoracic Surgery \par Director of Comprehensive Lung and Foregut [place] \par Professor Cardiovascular & Thoracic Surgery  \par [facility] [employer] [facility] at [facility]\par \par [facility]\par [address]\par Oncology [address]\[place] [address]\par Tel: [phone]\par Fax: [phone]\par

## 2023-02-03 NOTE — PHYSICAL EXAM
[] : no respiratory distress [Respiration, Rhythm And Depth] : normal respiratory rhythm and effort [Exaggerated Use Of Accessory Muscles For Inspiration] : no accessory muscle use [Auscultation Breath Sounds / Voice Sounds] : lungs were clear to auscultation bilaterally [Examination Of The Chest] : the chest was normal in appearance [Chest Visual Inspection Thoracic Asymmetry] : no chest asymmetry [Diminished Respiratory Excursion] : normal chest expansion [2+] : left 2+ [Bowel Sounds] : normal bowel sounds [Abdomen Soft] : soft [Abdomen Tenderness] : non-tender [Involuntary Movements] : no involuntary movements were seen [Abnormal Walk] : normal gait [Skin Color & Pigmentation] : normal skin color and pigmentation [No Focal Deficits] : no focal deficits [Oriented To Time, Place, And Person] : oriented to person, place, and time [Cervical Lymph Nodes Enlarged Posterior Bilaterally] : posterior cervical [Cervical Lymph Nodes Enlarged Anterior Bilaterally] : anterior cervical [Supraclavicular Lymph Nodes Enlarged Bilaterally] : supraclavicular [FreeTextEntry1] : Well approximated, well healed surgical incisions

## 2023-03-02 ENCOUNTER — OUTPATIENT (OUTPATIENT)
Dept: OUTPATIENT SERVICES | Facility: HOSPITAL | Age: 78
LOS: 1 days | End: 2023-03-02
Payer: MEDICARE

## 2023-03-02 VITALS
RESPIRATION RATE: 18 BRPM | DIASTOLIC BLOOD PRESSURE: 78 MMHG | WEIGHT: 145.06 LBS | TEMPERATURE: 98 F | HEIGHT: 62 IN | SYSTOLIC BLOOD PRESSURE: 172 MMHG | HEART RATE: 80 BPM | OXYGEN SATURATION: 97 %

## 2023-03-02 DIAGNOSIS — Z98.890 OTHER SPECIFIED POSTPROCEDURAL STATES: Chronic | ICD-10-CM

## 2023-03-02 DIAGNOSIS — I10 ESSENTIAL (PRIMARY) HYPERTENSION: ICD-10-CM

## 2023-03-02 DIAGNOSIS — Z90.89 ACQUIRED ABSENCE OF OTHER ORGANS: Chronic | ICD-10-CM

## 2023-03-02 DIAGNOSIS — K44.9 DIAPHRAGMATIC HERNIA WITHOUT OBSTRUCTION OR GANGRENE: ICD-10-CM

## 2023-03-02 DIAGNOSIS — Z91.89 OTHER SPECIFIED PERSONAL RISK FACTORS, NOT ELSEWHERE CLASSIFIED: ICD-10-CM

## 2023-03-02 LAB
ANION GAP SERPL CALC-SCNC: 12 MMOL/L — SIGNIFICANT CHANGE UP (ref 7–14)
BLD GP AB SCN SERPL QL: NEGATIVE — SIGNIFICANT CHANGE UP
BUN SERPL-MCNC: 28 MG/DL — HIGH (ref 7–23)
CALCIUM SERPL-MCNC: 9.5 MG/DL — SIGNIFICANT CHANGE UP (ref 8.4–10.5)
CHLORIDE SERPL-SCNC: 102 MMOL/L — SIGNIFICANT CHANGE UP (ref 98–107)
CO2 SERPL-SCNC: 25 MMOL/L — SIGNIFICANT CHANGE UP (ref 22–31)
CREAT SERPL-MCNC: 1.19 MG/DL — SIGNIFICANT CHANGE UP (ref 0.5–1.3)
EGFR: 62 ML/MIN/1.73M2 — SIGNIFICANT CHANGE UP
GLUCOSE SERPL-MCNC: 85 MG/DL — SIGNIFICANT CHANGE UP (ref 70–99)
HCT VFR BLD CALC: 41.6 % — SIGNIFICANT CHANGE UP (ref 39–50)
HGB BLD-MCNC: 12.8 G/DL — LOW (ref 13–17)
MCHC RBC-ENTMCNC: 29.3 PG — SIGNIFICANT CHANGE UP (ref 27–34)
MCHC RBC-ENTMCNC: 30.8 GM/DL — LOW (ref 32–36)
MCV RBC AUTO: 95.2 FL — SIGNIFICANT CHANGE UP (ref 80–100)
NRBC # BLD: 0 /100 WBCS — SIGNIFICANT CHANGE UP (ref 0–0)
NRBC # FLD: 0 K/UL — SIGNIFICANT CHANGE UP (ref 0–0)
PLATELET # BLD AUTO: 290 K/UL — SIGNIFICANT CHANGE UP (ref 150–400)
POTASSIUM SERPL-MCNC: 4.2 MMOL/L — SIGNIFICANT CHANGE UP (ref 3.5–5.3)
POTASSIUM SERPL-SCNC: 4.2 MMOL/L — SIGNIFICANT CHANGE UP (ref 3.5–5.3)
RBC # BLD: 4.37 M/UL — SIGNIFICANT CHANGE UP (ref 4.2–5.8)
RBC # FLD: 15.5 % — HIGH (ref 10.3–14.5)
RH IG SCN BLD-IMP: NEGATIVE — SIGNIFICANT CHANGE UP
SODIUM SERPL-SCNC: 139 MMOL/L — SIGNIFICANT CHANGE UP (ref 135–145)
WBC # BLD: 6.16 K/UL — SIGNIFICANT CHANGE UP (ref 3.8–10.5)
WBC # FLD AUTO: 6.16 K/UL — SIGNIFICANT CHANGE UP (ref 3.8–10.5)

## 2023-03-02 PROCEDURE — 93010 ELECTROCARDIOGRAM REPORT: CPT

## 2023-03-02 RX ORDER — BUDESONIDE AND FORMOTEROL FUMARATE DIHYDRATE 160; 4.5 UG/1; UG/1
2 AEROSOL RESPIRATORY (INHALATION)
Qty: 0 | Refills: 0 | DISCHARGE

## 2023-03-02 RX ORDER — LATANOPROST 0.05 MG/ML
2 SOLUTION/ DROPS OPHTHALMIC; TOPICAL
Qty: 0 | Refills: 0 | DISCHARGE

## 2023-03-02 RX ORDER — ACETAMINOPHEN 500 MG
2 TABLET ORAL
Qty: 0 | Refills: 0 | DISCHARGE

## 2023-03-02 RX ORDER — MULTIVIT-MIN/FERROUS GLUCONATE 9 MG/15 ML
1 LIQUID (ML) ORAL
Qty: 0 | Refills: 0 | DISCHARGE

## 2023-03-02 RX ORDER — SODIUM CHLORIDE 9 MG/ML
1000 INJECTION, SOLUTION INTRAVENOUS
Refills: 0 | Status: DISCONTINUED | OUTPATIENT
Start: 2023-03-14 | End: 2023-03-14

## 2023-03-02 RX ORDER — FERROUS SULFATE 325(65) MG
0 TABLET ORAL
Qty: 0 | Refills: 0 | DISCHARGE

## 2023-03-02 RX ORDER — CHOLECALCIFEROL (VITAMIN D3) 125 MCG
1 CAPSULE ORAL
Qty: 0 | Refills: 0 | DISCHARGE

## 2023-03-02 RX ORDER — AMLODIPINE AND VALSARTAN 5; 320 MG/1; MG/1
1 TABLET, FILM COATED ORAL
Qty: 0 | Refills: 0 | DISCHARGE

## 2023-03-02 RX ORDER — TIOTROPIUM BROMIDE 18 UG/1
2.5 CAPSULE ORAL; RESPIRATORY (INHALATION)
Qty: 0 | Refills: 0 | DISCHARGE

## 2023-03-02 NOTE — H&P PST ADULT - RESPIRATORY
clear to auscultation bilaterally/no wheezes/no rales/no rhonchi/no use of accessory muscles/no subcutaneous emphysema clear to auscultation bilaterally/no wheezes/no rales/no rhonchi/no respiratory distress/no use of accessory muscles/no subcutaneous emphysema/airway patent/breath sounds equal/good air movement/respirations non-labored

## 2023-03-02 NOTE — H&P PST ADULT - PROBLEM SELECTOR PLAN 1
Schedule for robotic esophagogastroduodenoscopy, laparoscopic redo hiatal hernia repair on 03/14/2023. Pre op instructions, chlorhexidine gluconate soap given and explained. Pt verbalized understanding.  Covid -19 PCR ordered

## 2023-03-02 NOTE — H&P PST ADULT - OTHER CARE PROVIDERS
Dr. Volodymyr Pearce pulmonary [phone], Dr. Marya Zaman [phone] Dr. Volodymyr Pearce pulmonary [phone], Dr. Marya Zamna (Cardio) [phone]

## 2023-03-02 NOTE — H&P PST ADULT - GASTROINTESTINAL
soft/nontender/nondistended/normal active bowel sounds details… soft/nontender/nondistended/normal active bowel sounds/no palpable lena

## 2023-03-02 NOTE — H&P PST ADULT - HISTORY OF PRESENT ILLNESS
78 y/o male     heduled for esophagogastroduodenoscopy, robotic assisted laparoscopic repair of hiatal hernia fundoplication on 7/12/2021.  Pt states, "hx htn, hld, copd, crohn's disease, lung cancer s/p JULIO segmentectomy 2/2019 denies chemo and radiation.  F/u EGD show hiatal hernia increasing size, c/o gerd with laying down, does not happen frequently."   76 y/o male with H/O: HTN, HLD, COPD, lung cancer s/p JULIO segmentectomy 2/2019 presents to PST for pre op evaluation with pre op diagnosis diaphragmatic hernia w/o obstruction or gangrene -S/P esophagogastroduodenoscopy, robotic assisted laparoscopic repair of hiatal hernia fundoplication on 7/12/2021. Pt is now schedule for robotic esophagogastroduodenoscopy, laparoscopic redo hiatal hernia repair

## 2023-03-06 ENCOUNTER — APPOINTMENT (OUTPATIENT)
Dept: INTERNAL MEDICINE | Facility: CLINIC | Age: 78
End: 2023-03-06
Payer: MEDICARE

## 2023-03-06 VITALS
HEIGHT: 63 IN | HEART RATE: 66 BPM | BODY MASS INDEX: 25.16 KG/M2 | WEIGHT: 142 LBS | OXYGEN SATURATION: 95 % | TEMPERATURE: 97.2 F | DIASTOLIC BLOOD PRESSURE: 82 MMHG | SYSTOLIC BLOOD PRESSURE: 144 MMHG

## 2023-03-06 DIAGNOSIS — Z01.818 ENCOUNTER FOR OTHER PREPROCEDURAL EXAMINATION: ICD-10-CM

## 2023-03-06 PROCEDURE — 99214 OFFICE O/P EST MOD 30 MIN: CPT

## 2023-03-13 PROBLEM — Z01.818 PREOPERATIVE EXAMINATION: Status: ACTIVE | Noted: 2021-07-04

## 2023-03-13 NOTE — PLAN
[FreeTextEntry1] : discussed w pt \par \par reviewed preop lab testing, unremarkable/stable \par \par EKG reviewed \par \par no contraindications to the planned surgery as scheduled. his known risk with COPD and HUSSAIN should be taken into account, but he has tolerated surgeries well in the past. has quit smoking completely. \par \par hold NSAIDs one week prior to surgery \par \par take amlodipine/valsartan on morning of surgery w sip of water \par \par please call with any questions \par \par cont f/u w GI , thoracic surgery, pulmonology as scheduled \par \par RTO 4 months for routine f/u or earlier prn if any new concerns

## 2023-03-13 NOTE — ASU PATIENT PROFILE, ADULT - MUTUALITY COMMENT, PROFILE
Discussed with Pt his ability to have questions answered by dr tracy & anesthesia before going into the OR Discussed with Pt & his wife their ability to have questions answered by dr tracy & anesthesia before going into the OR

## 2023-03-13 NOTE — REVIEW OF SYSTEMS
[Heartburn] : heartburn [Negative] : Heme/Lymph [Abdominal Pain] : no abdominal pain [Diarrhea] : no diarrhea [Vomiting] : no vomiting [Dysuria] : no dysuria [Hematuria] : no hematuria [Joint Pain] : no joint pain [Joint Swelling] : no joint swelling

## 2023-03-13 NOTE — HISTORY OF PRESENT ILLNESS
[No Pertinent Cardiac History] : no history of aortic stenosis, atrial fibrillation, coronary artery disease, recent myocardial infarction, or implantable device/pacemaker [COPD] : COPD [No Adverse Anesthesia Reaction] : no adverse anesthesia reaction in self or family member [(Patient denies any chest pain, claudication, dyspnea on exertion, orthopnea, palpitations or syncope)] : Patient denies any chest pain, claudication, dyspnea on exertion, orthopnea, palpitations or syncope [Moderate (4-6 METs)] : Moderate (4-6 METs) [Chronic Anticoagulation] : no chronic anticoagulation [Diabetes] : no diabetes [FreeTextEntry1] : - laparoscopic, robotic assisted hiatal hernia repair  [FreeTextEntry2] : 3/14/23 [FreeTextEntry3] : - Dr Mervin MELVIN  [FreeTextEntry4] : \par presents for medical evaluation prior to planned laparoscopic hiatal hernia repair, revision for recurrent hernia with symptoms. he is feeling well currently, no new concerns. \par asymptomatic COVID infection in 12/22, no residual concerns. \par \par chronic GERD on rx \par COVID/emphysema stable, using inhalers intermittently \par s/p L lung adenocarcinoma resection at [facility], remains abstinent from smoking \par HTN controlled on rx\par BPH symptoms w elevated PSA s/p benign prostate biopsy \par Crohns disease stable on Lialda, following w GI

## 2023-03-13 NOTE — PHYSICAL EXAM
[No Acute Distress] : no acute distress [Well-Appearing] : well-appearing [Normal Voice/Communication] : normal voice/communication [No Lymphadenopathy] : no lymphadenopathy [Normal] : no respiratory distress, lungs were clear to auscultation bilaterally and no accessory muscle use [Normal Rate] : normal rate  [Regular Rhythm] : with a regular rhythm [Normal S1, S2] : normal S1 and S2 [No Murmur] : no murmur heard [No Carotid Bruits] : no carotid bruits [Pedal Pulses Present] : the pedal pulses are present [No Edema] : there was no peripheral edema [Normal Supraclavicular Nodes] : no supraclavicular lymphadenopathy [Normal Posterior Cervical Nodes] : no posterior cervical lymphadenopathy [Normal Anterior Cervical Nodes] : no anterior cervical lymphadenopathy [Normal Gait] : normal gait [Speech Grossly Normal] : speech grossly normal [Alert and Oriented x3] : oriented to person, place, and time [Normal Mood] : the mood was normal

## 2023-03-14 ENCOUNTER — INPATIENT (INPATIENT)
Facility: HOSPITAL | Age: 78
LOS: 0 days | Discharge: HOME | End: 2023-03-15
Attending: THORACIC SURGERY (CARDIOTHORACIC VASCULAR SURGERY) | Admitting: THORACIC SURGERY (CARDIOTHORACIC VASCULAR SURGERY)
Payer: MEDICARE

## 2023-03-14 ENCOUNTER — APPOINTMENT (OUTPATIENT)
Dept: THORACIC SURGERY | Facility: HOSPITAL | Age: 78
End: 2023-03-14

## 2023-03-14 ENCOUNTER — TRANSCRIPTION ENCOUNTER (OUTPATIENT)
Age: 78
End: 2023-03-14

## 2023-03-14 ENCOUNTER — RESULT REVIEW (OUTPATIENT)
Age: 78
End: 2023-03-14

## 2023-03-14 VITALS
HEIGHT: 62 IN | DIASTOLIC BLOOD PRESSURE: 82 MMHG | WEIGHT: 145.06 LBS | SYSTOLIC BLOOD PRESSURE: 153 MMHG | TEMPERATURE: 98 F | RESPIRATION RATE: 14 BRPM | HEART RATE: 83 BPM | OXYGEN SATURATION: 96 %

## 2023-03-14 DIAGNOSIS — Z98.890 OTHER SPECIFIED POSTPROCEDURAL STATES: Chronic | ICD-10-CM

## 2023-03-14 DIAGNOSIS — K44.9 DIAPHRAGMATIC HERNIA WITHOUT OBSTRUCTION OR GANGRENE: ICD-10-CM

## 2023-03-14 DIAGNOSIS — Z90.89 ACQUIRED ABSENCE OF OTHER ORGANS: Chronic | ICD-10-CM

## 2023-03-14 LAB — SARS-COV-2 RNA SPEC QL NAA+PROBE: SIGNIFICANT CHANGE UP

## 2023-03-14 PROCEDURE — 99233 SBSQ HOSP IP/OBS HIGH 50: CPT

## 2023-03-14 PROCEDURE — 71045 X-RAY EXAM CHEST 1 VIEW: CPT | Mod: 26,76

## 2023-03-14 DEVICE — FELT PTFE 2 X 2": Type: IMPLANTABLE DEVICE | Status: FUNCTIONAL

## 2023-03-14 DEVICE — CHEST DRAIN THORACIC ARGYLE PVC 24FR STRAIGHT: Type: IMPLANTABLE DEVICE | Status: FUNCTIONAL

## 2023-03-14 DEVICE — LIGATING CLIPS WECK HEMOLOK POLYMER LARGE (PURPLE) 6: Type: IMPLANTABLE DEVICE | Status: FUNCTIONAL

## 2023-03-14 RX ORDER — MESALAMINE 400 MG
2 TABLET, DELAYED RELEASE (ENTERIC COATED) ORAL
Qty: 0 | Refills: 0 | DISCHARGE

## 2023-03-14 RX ORDER — ONDANSETRON 8 MG/1
4 TABLET, FILM COATED ORAL EVERY 6 HOURS
Refills: 0 | Status: DISCONTINUED | OUTPATIENT
Start: 2023-03-14 | End: 2023-03-15

## 2023-03-14 RX ORDER — HEPARIN SODIUM 5000 [USP'U]/ML
5000 INJECTION INTRAVENOUS; SUBCUTANEOUS ONCE
Refills: 0 | Status: COMPLETED | OUTPATIENT
Start: 2023-03-14 | End: 2023-03-14

## 2023-03-14 RX ORDER — SODIUM CHLORIDE 9 MG/ML
1000 INJECTION, SOLUTION INTRAVENOUS
Refills: 0 | Status: DISCONTINUED | OUTPATIENT
Start: 2023-03-14 | End: 2023-03-14

## 2023-03-14 RX ORDER — HYDROMORPHONE HYDROCHLORIDE 2 MG/ML
30 INJECTION INTRAMUSCULAR; INTRAVENOUS; SUBCUTANEOUS
Refills: 0 | Status: DISCONTINUED | OUTPATIENT
Start: 2023-03-14 | End: 2023-03-15

## 2023-03-14 RX ORDER — ERGOCALCIFEROL 1.25 MG/1
1 CAPSULE ORAL
Qty: 0 | Refills: 0 | DISCHARGE

## 2023-03-14 RX ORDER — SUCRALFATE 1 G
10 TABLET ORAL
Qty: 0 | Refills: 0 | DISCHARGE

## 2023-03-14 RX ORDER — FINASTERIDE 5 MG/1
1 TABLET, FILM COATED ORAL
Qty: 0 | Refills: 0 | DISCHARGE

## 2023-03-14 RX ORDER — BUDESONIDE AND FORMOTEROL FUMARATE DIHYDRATE 160; 4.5 UG/1; UG/1
2 AEROSOL RESPIRATORY (INHALATION)
Refills: 0 | Status: DISCONTINUED | OUTPATIENT
Start: 2023-03-14 | End: 2023-03-15

## 2023-03-14 RX ORDER — SIMVASTATIN 20 MG/1
1 TABLET, FILM COATED ORAL
Qty: 0 | Refills: 0 | DISCHARGE

## 2023-03-14 RX ORDER — FERROUS SULFATE 325(65) MG
1 TABLET ORAL
Qty: 0 | Refills: 0 | DISCHARGE

## 2023-03-14 RX ORDER — LATANOPROST 0.05 MG/ML
1 SOLUTION/ DROPS OPHTHALMIC; TOPICAL AT BEDTIME
Refills: 0 | Status: DISCONTINUED | OUTPATIENT
Start: 2023-03-14 | End: 2023-03-15

## 2023-03-14 RX ORDER — BUDESONIDE AND FORMOTEROL FUMARATE DIHYDRATE 160; 4.5 UG/1; UG/1
2 AEROSOL RESPIRATORY (INHALATION)
Qty: 0 | Refills: 0 | DISCHARGE

## 2023-03-14 RX ORDER — TIOTROPIUM BROMIDE 18 UG/1
2 CAPSULE ORAL; RESPIRATORY (INHALATION) DAILY
Refills: 0 | Status: DISCONTINUED | OUTPATIENT
Start: 2023-03-14 | End: 2023-03-15

## 2023-03-14 RX ORDER — LATANOPROST 0.05 MG/ML
1 SOLUTION/ DROPS OPHTHALMIC; TOPICAL
Qty: 0 | Refills: 0 | DISCHARGE

## 2023-03-14 RX ORDER — SODIUM CHLORIDE 9 MG/ML
1000 INJECTION, SOLUTION INTRAVENOUS
Refills: 0 | Status: DISCONTINUED | OUTPATIENT
Start: 2023-03-14 | End: 2023-03-15

## 2023-03-14 RX ORDER — SIMVASTATIN 20 MG/1
40 TABLET, FILM COATED ORAL AT BEDTIME
Refills: 0 | Status: DISCONTINUED | OUTPATIENT
Start: 2023-03-15 | End: 2023-03-15

## 2023-03-14 RX ORDER — HYDROMORPHONE HYDROCHLORIDE 2 MG/ML
0.5 INJECTION INTRAMUSCULAR; INTRAVENOUS; SUBCUTANEOUS
Refills: 0 | Status: DISCONTINUED | OUTPATIENT
Start: 2023-03-14 | End: 2023-03-15

## 2023-03-14 RX ORDER — FINASTERIDE 5 MG/1
5 TABLET, FILM COATED ORAL DAILY
Refills: 0 | Status: DISCONTINUED | OUTPATIENT
Start: 2023-03-14 | End: 2023-03-15

## 2023-03-14 RX ORDER — PANTOPRAZOLE SODIUM 20 MG/1
1 TABLET, DELAYED RELEASE ORAL
Qty: 0 | Refills: 0 | DISCHARGE

## 2023-03-14 RX ORDER — HEPARIN SODIUM 5000 [USP'U]/ML
5000 INJECTION INTRAVENOUS; SUBCUTANEOUS EVERY 8 HOURS
Refills: 0 | Status: DISCONTINUED | OUTPATIENT
Start: 2023-03-14 | End: 2023-03-15

## 2023-03-14 RX ORDER — TIOTROPIUM BROMIDE 18 UG/1
2 CAPSULE ORAL; RESPIRATORY (INHALATION)
Qty: 0 | Refills: 0 | DISCHARGE

## 2023-03-14 RX ORDER — AMLODIPINE BESYLATE 2.5 MG/1
1 TABLET ORAL
Qty: 0 | Refills: 0 | DISCHARGE

## 2023-03-14 RX ADMIN — HYDROMORPHONE HYDROCHLORIDE 30 MILLILITER(S): 2 INJECTION INTRAMUSCULAR; INTRAVENOUS; SUBCUTANEOUS at 21:28

## 2023-03-14 RX ADMIN — HEPARIN SODIUM 5000 UNIT(S): 5000 INJECTION INTRAVENOUS; SUBCUTANEOUS at 12:17

## 2023-03-14 RX ADMIN — SODIUM CHLORIDE 75 MILLILITER(S): 9 INJECTION, SOLUTION INTRAVENOUS at 21:30

## 2023-03-14 RX ADMIN — LATANOPROST 1 DROP(S): 0.05 SOLUTION/ DROPS OPHTHALMIC; TOPICAL at 22:30

## 2023-03-14 RX ADMIN — HEPARIN SODIUM 5000 UNIT(S): 5000 INJECTION INTRAVENOUS; SUBCUTANEOUS at 22:30

## 2023-03-14 NOTE — PATIENT PROFILE ADULT - FUNCTIONAL ASSESSMENT - BASIC MOBILITY 6.
3-calculated by average/Not able to assess (calculate score using [facility] averaging method)

## 2023-03-14 NOTE — PATIENT PROFILE ADULT - FALL HARM RISK - HARM RISK INTERVENTIONS
Reinforce activity limits and safety measures with patient and family/Reorient to person, place and time as needed/Review medications for side effects contributing to fall risk/Sit up slowly, dangle for a short time, stand at bedside before walking/Tailored Fall Risk Interventions/Toileting schedule using arm’s reach rule for commode and bathroom/Use of alarms - bed, chair and/or voice tab/Visual Cue: Yellow wristband and red socks/Bed in lowest position, wheels locked, appropriate side rails in place/Call bell, personal items and telephone in reach/Instruct patient to call for assistance before getting out of bed or chair/Non-slip footwear when patient is out of bed/[place] to call system/Physically safe environment - no spills, clutter or unnecessary equipment/Purposeful Proactive Rounding/Room/bathroom lighting operational, light cord in reach

## 2023-03-14 NOTE — PROGRESS NOTE ADULT - SUBJECTIVE AND OBJECTIVE BOX
CHIEF COMPLAINT: FOLLOW UP IN ICU FOR POSTOPERATIVE CARE OF PATIENT WHO IS S/P       PROCEDURES:                   Robot-assisted hiatal hernia repair, laparoscopic gastropexy 14-Mar-2023      ISSUES:     Recurrent hiatal hernia  Postoperative pain  Chest tube in place  HTN  Crohns disease  COPD  h/o Lung cancer, prior JULIO segmentectomy in 2019  [place]  BPH    INTERVAL EVENTS:   OR today. Extubated in OR. Transferred to CTICU.      HISTORY:   Patient reports moderate pain at chest wall incision sites which is worse with coughing and deep breathing without associated fever or dyspnea. Pain is improved with use of pain meds.     PHYSICAL EXAM:   Gen: Comfortable, No acute distress  Eyes: Sclera white, Conjunctiva normal, Eyelids normal, Pupils symmetrical   ENT: Mucous membranes moist,  ,  ,    Neck: Trachea midline,  ,  ,  ,  ,  ,    CV: Rate regular, Rhythm regular,  ,  ,    Resp: Breath sounds clear, No accessory muscles use, L chest tube in place,  ,  Subcut air++  Abd: Soft, Non-distended, Non-tender, Bowel sounds normal,  ,  ,    Skin: Warm, No peripheral edema of lower extremities,  ,    : No veliz  Neuro: Moving all 4 extremities,    Psych: A&Ox3      ASSESSMENT AND PLAN:     NEURO:  Post-operative Pain - Stable. Pain control with PCA and Tylenol IV PRN.          RESPIRATORY:  Hypoxia - Wean nasal cannula for goal O2sat above 92. Obtain CXR. Incentive spirometry. Chest PT and frequent suctioning. Continue bronchodilators. OOB to chair & ambulate w/ assistance. Continuous pulse oximetry for support & to prevent decompensation.    Chest tube – Pleurevac regulated suctioning. Monitor chest tube output.          CARDIOVASCULAR:  Hemodynamically stable - Not on pressors. Continue hemodynamic monitoring.  Telemetry (medical test) - Reviewed by me today independently. Normal sinus rhythm.              RENAL:  Stable - Monitor IOs and electrolytes. Keep K above 4.0 and Mg above 2.0.IVF@75ml/h          GASTROINTESTINAL:  GI prophylaxis   Zofran and Reglan IV PRN for nausea  NPO, barium swallow in AM            HEMATOLOGIC:  No signs of active bleeding. Monitor Hgb in CBC in AM  DVT prophylaxis with heparin subQ and SCDs.           INFECTIOUS DISEASE:  All surgical sites appear clean. No signs of active infection. Will monitor for fever and leukocytosis.           ENDOCRINE:  Stable – Monitor glucose fingersticks for goal 120-180.             Pertinent clinical, laboratory, radiographic, hemodynamic, echocardiographic, respiratory data, microbiologic data and chart were reviewed by myself and analyzed frequently throughout the course of the day and night by myself.    Plan discussed at length with the CTICU staff and Attending CT Surgeon -   Dr Mervin Silverman.    Patient's status was discussed with patient at bedside.    ________________________________________________    _________________________  VITAL SIGNS:  Vital Signs Last 24 Hrs  T(C): 35.7 (14 Mar 2023 20:20), Max: 36.8 (14 Mar 2023 10:41)  T(F): 96.2 (14 Mar 2023 20:20), Max: 98.2 (14 Mar 2023 10:41)  HR: 75 (14 Mar 2023 21:30) (69 - 83)  BP: 149/71 (14 Mar 2023 21:30) (124/90 - 153/82)  BP(mean): 95 (14 Mar 2023 21:30) (86 - 103)  RR: 14 (14 Mar 2023 21:30) (14 - 22)  SpO2: 94% (14 Mar 2023 21:30) (93% - 96%)    Parameters below as of 14 Mar 2023 21:30  Patient On (Oxygen Delivery Method): nasal cannula w/ humidification  O2 Flow (L/min): 2    I/Os:   I&O's Detail    14 Mar 2023 07:01  -  14 Mar 2023 21:44  --------------------------------------------------------  IN:    dextrose 5% + lactated ringers: 225 mL  Total IN: 225 mL    OUT:    Chest Tube (mL): 0 mL  Total OUT: 0 mL    Total NET: 225 mL              MEDICATIONS:  MEDICATIONS  (STANDING):  budesonide 160 MICROgram(s)/formoterol 4.5 MICROgram(s) Inhaler 2 Puff(s) Inhalation two times a day  dextrose 5% + lactated ringers. 1000 milliLiter(s) (75 mL/Hr) IV Continuous <Continuous>  finasteride 5 milliGRAM(s) Oral daily  heparin   Injectable 5000 Unit(s) SubCutaneous every 8 hours  HYDROmorphone PCA (1 mG/mL) 30 milliLiter(s) PCA Continuous PCA Continuous  latanoprost 0.005% Ophthalmic Solution 1 Drop(s) Both EYES at bedtime  tiotropium 2.5 MICROgram(s) Inhaler 2 Puff(s) Inhalation daily    MEDICATIONS  (PRN):  HYDROmorphone PCA (1 mG/mL) Rescue Clinician Bolus 0.5 milliGRAM(s) IV Push every 15 minutes PRN for Pain Scale GREATER THAN 6  ondansetron Injectable 4 milliGRAM(s) IV Push every 6 hours PRN Nausea      LABS:  Laboratory data was independently reviewed by me today.                       RADIOLOGY:   Radiology images were independently reviewed by me today. Reports were reviewed by me today.

## 2023-03-15 ENCOUNTER — TRANSCRIPTION ENCOUNTER (OUTPATIENT)
Age: 78
End: 2023-03-15

## 2023-03-15 VITALS
OXYGEN SATURATION: 94 % | HEART RATE: 86 BPM | TEMPERATURE: 97 F | RESPIRATION RATE: 21 BRPM | SYSTOLIC BLOOD PRESSURE: 157 MMHG | DIASTOLIC BLOOD PRESSURE: 75 MMHG

## 2023-03-15 LAB
ANION GAP SERPL CALC-SCNC: 10 MMOL/L — SIGNIFICANT CHANGE UP (ref 7–14)
ANION GAP SERPL CALC-SCNC: 16 MMOL/L — HIGH (ref 7–14)
BLD GP AB SCN SERPL QL: NEGATIVE — SIGNIFICANT CHANGE UP
BUN SERPL-MCNC: 18 MG/DL — SIGNIFICANT CHANGE UP (ref 7–23)
BUN SERPL-MCNC: 20 MG/DL — SIGNIFICANT CHANGE UP (ref 7–23)
CALCIUM SERPL-MCNC: 9 MG/DL — SIGNIFICANT CHANGE UP (ref 8.4–10.5)
CALCIUM SERPL-MCNC: 9.1 MG/DL — SIGNIFICANT CHANGE UP (ref 8.4–10.5)
CHLORIDE SERPL-SCNC: 101 MMOL/L — SIGNIFICANT CHANGE UP (ref 98–107)
CHLORIDE SERPL-SCNC: 99 MMOL/L — SIGNIFICANT CHANGE UP (ref 98–107)
CO2 SERPL-SCNC: 22 MMOL/L — SIGNIFICANT CHANGE UP (ref 22–31)
CO2 SERPL-SCNC: 27 MMOL/L — SIGNIFICANT CHANGE UP (ref 22–31)
CREAT SERPL-MCNC: 1.29 MG/DL — SIGNIFICANT CHANGE UP (ref 0.5–1.3)
CREAT SERPL-MCNC: 1.41 MG/DL — HIGH (ref 0.5–1.3)
EGFR: 51 ML/MIN/1.73M2 — LOW
EGFR: 56 ML/MIN/1.73M2 — LOW
GLUCOSE SERPL-MCNC: 115 MG/DL — HIGH (ref 70–99)
GLUCOSE SERPL-MCNC: 179 MG/DL — HIGH (ref 70–99)
HCT VFR BLD CALC: 42.6 % — SIGNIFICANT CHANGE UP (ref 39–50)
HGB BLD-MCNC: 13.1 G/DL — SIGNIFICANT CHANGE UP (ref 13–17)
MAGNESIUM SERPL-MCNC: 2.2 MG/DL — SIGNIFICANT CHANGE UP (ref 1.6–2.6)
MCHC RBC-ENTMCNC: 29.2 PG — SIGNIFICANT CHANGE UP (ref 27–34)
MCHC RBC-ENTMCNC: 30.8 GM/DL — LOW (ref 32–36)
MCV RBC AUTO: 94.9 FL — SIGNIFICANT CHANGE UP (ref 80–100)
NRBC # BLD: 0 /100 WBCS — SIGNIFICANT CHANGE UP (ref 0–0)
NRBC # FLD: 0 K/UL — SIGNIFICANT CHANGE UP (ref 0–0)
PHOSPHATE SERPL-MCNC: 3.7 MG/DL — SIGNIFICANT CHANGE UP (ref 2.5–4.5)
PLATELET # BLD AUTO: 235 K/UL — SIGNIFICANT CHANGE UP (ref 150–400)
POTASSIUM SERPL-MCNC: 4.8 MMOL/L — SIGNIFICANT CHANGE UP (ref 3.5–5.3)
POTASSIUM SERPL-MCNC: 5.3 MMOL/L — SIGNIFICANT CHANGE UP (ref 3.5–5.3)
POTASSIUM SERPL-SCNC: 4.8 MMOL/L — SIGNIFICANT CHANGE UP (ref 3.5–5.3)
POTASSIUM SERPL-SCNC: 5.3 MMOL/L — SIGNIFICANT CHANGE UP (ref 3.5–5.3)
RBC # BLD: 4.49 M/UL — SIGNIFICANT CHANGE UP (ref 4.2–5.8)
RBC # FLD: 14.9 % — HIGH (ref 10.3–14.5)
RH IG SCN BLD-IMP: NEGATIVE — SIGNIFICANT CHANGE UP
SODIUM SERPL-SCNC: 137 MMOL/L — SIGNIFICANT CHANGE UP (ref 135–145)
SODIUM SERPL-SCNC: 138 MMOL/L — SIGNIFICANT CHANGE UP (ref 135–145)
WBC # BLD: 9.98 K/UL — SIGNIFICANT CHANGE UP (ref 3.8–10.5)
WBC # FLD AUTO: 9.98 K/UL — SIGNIFICANT CHANGE UP (ref 3.8–10.5)

## 2023-03-15 PROCEDURE — 71045 X-RAY EXAM CHEST 1 VIEW: CPT | Mod: 26

## 2023-03-15 PROCEDURE — 74220 X-RAY XM ESOPHAGUS 1CNTRST: CPT | Mod: 26

## 2023-03-15 PROCEDURE — 99233 SBSQ HOSP IP/OBS HIGH 50: CPT

## 2023-03-15 RX ORDER — ALBUTEROL 90 UG/1
2.5 AEROSOL, METERED ORAL EVERY 6 HOURS
Refills: 0 | Status: DISCONTINUED | OUTPATIENT
Start: 2023-03-15 | End: 2023-03-15

## 2023-03-15 RX ORDER — ALBUMIN HUMAN 25 %
250 VIAL (ML) INTRAVENOUS ONCE
Refills: 0 | Status: COMPLETED | OUTPATIENT
Start: 2023-03-15 | End: 2023-03-15

## 2023-03-15 RX ORDER — SODIUM CHLORIDE 9 MG/ML
500 INJECTION, SOLUTION INTRAVENOUS ONCE
Refills: 0 | Status: COMPLETED | OUTPATIENT
Start: 2023-03-15 | End: 2023-03-15

## 2023-03-15 RX ORDER — PANTOPRAZOLE SODIUM 20 MG/1
40 TABLET, DELAYED RELEASE ORAL
Refills: 0 | Status: DISCONTINUED | OUTPATIENT
Start: 2023-03-15 | End: 2023-03-15

## 2023-03-15 RX ORDER — ACETAMINOPHEN 500 MG
1000 TABLET ORAL ONCE
Refills: 0 | Status: COMPLETED | OUTPATIENT
Start: 2023-03-15 | End: 2023-03-15

## 2023-03-15 RX ORDER — OXYCODONE HYDROCHLORIDE 5 MG/1
1 TABLET ORAL
Qty: 30 | Refills: 0
Start: 2023-03-15 | End: 2023-03-20

## 2023-03-15 RX ORDER — ACETAMINOPHEN 500 MG
1000 TABLET ORAL ONCE
Refills: 0 | Status: COMPLETED | OUTPATIENT
Start: 2023-03-15

## 2023-03-15 RX ORDER — OXYCODONE HYDROCHLORIDE 5 MG/1
5 TABLET ORAL
Refills: 0 | Status: DISCONTINUED | OUTPATIENT
Start: 2023-03-15 | End: 2023-03-15

## 2023-03-15 RX ORDER — LIDOCAINE 4 G/100G
1 CREAM TOPICAL DAILY
Refills: 0 | Status: DISCONTINUED | OUTPATIENT
Start: 2023-03-15 | End: 2023-03-15

## 2023-03-15 RX ORDER — ACETAMINOPHEN 500 MG
650 TABLET ORAL EVERY 6 HOURS
Refills: 0 | Status: DISCONTINUED | OUTPATIENT
Start: 2023-03-15 | End: 2023-03-15

## 2023-03-15 RX ORDER — AMLODIPINE BESYLATE 2.5 MG/1
5 TABLET ORAL DAILY
Refills: 0 | Status: DISCONTINUED | OUTPATIENT
Start: 2023-03-15 | End: 2023-03-15

## 2023-03-15 RX ADMIN — Medication 250 MILLILITER(S): at 13:24

## 2023-03-15 RX ADMIN — HEPARIN SODIUM 5000 UNIT(S): 5000 INJECTION INTRAVENOUS; SUBCUTANEOUS at 06:05

## 2023-03-15 RX ADMIN — SODIUM CHLORIDE 1000 MILLILITER(S): 9 INJECTION, SOLUTION INTRAVENOUS at 06:07

## 2023-03-15 RX ADMIN — HYDROMORPHONE HYDROCHLORIDE 30 MILLILITER(S): 2 INJECTION INTRAMUSCULAR; INTRAVENOUS; SUBCUTANEOUS at 07:14

## 2023-03-15 RX ADMIN — HEPARIN SODIUM 5000 UNIT(S): 5000 INJECTION INTRAVENOUS; SUBCUTANEOUS at 13:06

## 2023-03-15 RX ADMIN — AMLODIPINE BESYLATE 5 MILLIGRAM(S): 2.5 TABLET ORAL at 06:02

## 2023-03-15 RX ADMIN — Medication 400 MILLIGRAM(S): at 05:59

## 2023-03-15 RX ADMIN — FINASTERIDE 5 MILLIGRAM(S): 5 TABLET, FILM COATED ORAL at 13:06

## 2023-03-15 RX ADMIN — Medication 1000 MILLIGRAM(S): at 06:10

## 2023-03-15 RX ADMIN — SODIUM CHLORIDE 100 MILLILITER(S): 9 INJECTION, SOLUTION INTRAVENOUS at 06:02

## 2023-03-15 RX ADMIN — TIOTROPIUM BROMIDE 2 PUFF(S): 18 CAPSULE ORAL; RESPIRATORY (INHALATION) at 09:27

## 2023-03-15 NOTE — DISCHARGE NOTE PROVIDER - NSDCCPTREATMENT_GEN_ALL_CORE_FT
PRINCIPAL PROCEDURE  Procedure: Repair, hiatal hernia, robot-assisted  Findings and Treatment:       SECONDARY PROCEDURE  Procedure: Upper endoscopy  Findings and Treatment:     Procedure: Robot-assisted laparoscopic gastropexy  Findings and Treatment:

## 2023-03-15 NOTE — DISCHARGE NOTE PROVIDER - NSDCFUADDINST_GEN_ALL_CORE_FT
Follow up with Dr. Silverman in 10-14 days. Call Thoracic Surgery office [phone] to schedule an appointment. Please, obtain a CXR 1-2 days prior to your appointment and bring a copy with you.  Please, make an appointment with your Primary care provider.  You may shower in 24 hours with dressing and remove in the shower.  Keep the wound clean and dry it well after showering.  It’s OK if some fluid comes out of the chest tube hole unless blood or purulent. Blue Stitch will be removed by Dr. Silverman in the office. No driving while taking pain meds. Some coughing and discomfort is expected.  You can remove chest tube dressing for  shower and replace with band-aid after showering if you think it is necessary otherwise leave it open it air.    Your chest tube sites may ooze a little, simply keep the site clean with soap and water and place small dressing or band-aid.  Call your doctor if the drainage is purulent or pus like or if drainage is increasing.    Do not be alarmed with shoulder, neck or back pain.  This could be due to your positioning on the operating table and is muscular pain. Call your doctor for signs of wound infection such as wide area of redness beyond the edges of your incision, pus coming from incisional or drainage tube site, unusual or new swelling and fever greater than 101 degrees.  Go to ER for prolonged shortness of breath that gets worse or sudden onset or severe shortness of breath that does not get better with rest.

## 2023-03-15 NOTE — DISCHARGE NOTE PROVIDER - HOSPITAL COURSE
77M with PMHx of HTN, HLD, COPD, Crohn's, s/p JULIO Segmentectomy, very poor PFT's, Hiatal Hernia repair. He is now s/p redo EGD, Robotic Hiatal Hernia Repair, Toupet Fundoplication on 3/14/23. Barium swallow was done showed ___________________. Chest tube was reviewed and post x-ray was reviewed. Patient was seen by Dr Silverman and is stable for discharge with out patient follow up. 77M with PMHx of HTN, HLD, COPD, Crohn's, s/p JULIO Segmentectomy, very poor PFT's, Hiatal Hernia repair. He is now s/p redo EGD, Robotic Hiatal Hernia Repair, Toupet Fundoplication on 3/14/23. Barium swallow was done showed no leak, no delayed emptying. Pt was started on a clear liquid diet and patient tolerated it well. Chest tube was reviewed and post x-ray was reviewed. Patient was seen by Dr Silverman and is stable for discharge with out patient follow up.

## 2023-03-15 NOTE — DISCHARGE NOTE PROVIDER - INSTRUCTIONS
Please eat a clear liquid diet for 2 days after discharge. If you can tolerate a clear liquid, then progress to a full liquid diet for 2 days. If you can tolerate a full liquid, then you may advance to a soft diet. Please stay of a soft diet until your follow up. Please do not drink carbonated beverages. If you cannot tolerate a soft diet or a full liquid diet, please go back to a liquid diet and call the office.

## 2023-03-15 NOTE — PROGRESS NOTE ADULT - SUBJECTIVE AND OBJECTIVE BOX
CHIEF COMPLAINT: FOLLOW UP IN ICU FOR POSTOPERATIVE CARE OF PATIENT WHO IS S/P HIATAL HERNIA REPAIR      PROCEDURES:                 Robot-assisted hiatal hernia repair, laparoscopic gastropexy 14-Mar-2023      ISSUES:   MORENITA  Recurrent hiatal hernia  Postoperative pain  Chest tube in place  HTN  Crohn's disease  COPD  hx of Lung cancer s/p JULIO segmentectomy (2019)  Glaucoma  BPH      INTERVAL EVENTS:   Chest tube with no airleak  Swallow study with no leak  Started on clear diet  Creatinine worse than baseline this AM.    HISTORY:   Patient reports moderate pain at chest wall incision sites which is worse with coughing and deep breathing without associated fever or dyspnea. Pain is improved with use of pain meds.     PHYSICAL EXAM:   Gen: Comfortable, No acute distress  Eyes: Sclera white, Conjunctiva normal, Eyelids normal, Pupils symmetrical   ENT: Mucous membranes moist,  ,  ,    Neck: Trachea midline,  ,  ,  ,  ,  ,    CV: Rate regular, Rhythm regular,  ,  ,    Resp: Breath sounds clear, No accessory muscles use, L chest tube in place,   Abd: Soft, Non-distended, Non-tender, Bowel sounds normal,  ,  ,    Skin: Warm, No peripheral edema of lower extremities,  ,  subcutaneous emphysema  : No veliz  Neuro: Moving all 4 extremities,    Psych: A&Ox3      ASSESSMENT AND PLAN:     NEURO:  Post-operative Pain - Stable. Pain control with oxycodone and Tylenol IV PRN.          RESPIRATORY:  Hypoxia - Wean nasal cannula for goal O2sat above 92. Obtain CXR. Incentive spirometry. Chest PT and frequent suctioning. Continue bronchodilators. OOB to chair & ambulate w/ assistance. Continuous pulse oximetry for support & to prevent decompensation.    Chest tube – Discontinue chest tube     COPD - worsened by recent thoracic surgery. Continue home inhalers.       CARDIOVASCULAR:  Hemodynamically stable - Not on pressors. Continue hemodynamic monitoring.  Telemetry (medical test) - Reviewed by me today independently. Normal sinus rhythm.      HTN - stable. continue amlodipine.         RENAL:  MORENITA – Renally dose medications. Monitor for hyperkalemia and uremia. Avoid nephrotoxic medications. Monitor IOs and electrolytes. Give IVF. Recheck Creatinine.          GASTROINTESTINAL:  GI prophylaxis not indicated  Zofran and Reglan IV PRN for nausea      Crohn's disease - stable. resume Lialda after discharge.      Hiatal hernia - Improved after surgery  Clear liquid diet        HEMATOLOGIC:  No signs of active bleeding. Monitor Hgb in CBC in AM  DVT prophylaxis with heparin subQ and SCDs.           INFECTIOUS DISEASE:  All surgical sites appear clean. No signs of active infection. Will monitor for fever and leukocytosis.           ENDOCRINE:  Stable – Monitor glucose fingersticks for goal 120-180.             Pertinent clinical, laboratory, radiographic, hemodynamic, echocardiographic, respiratory data, microbiologic data and chart were reviewed by myself and analyzed frequently throughout the course of the day and night by myself.    Plan discussed at length with the CTICU staff and Attending CT Surgeon -   Dr Mervin Silverman.    Patient's status was discussed with patient at bedside.    ________________________________________________    _________________________  VITAL SIGNS:  Vital Signs Last 24 Hrs  T(C): 36.6 (15 Mar 2023 08:00), Max: 36.6 (15 Mar 2023 00:00)  T(F): 97.8 (15 Mar 2023 08:00), Max: 97.9 (15 Mar 2023 00:00)  HR: 82 (15 Mar 2023 12:00) (69 - 82)  BP: 151/69 (15 Mar 2023 12:00) (124/90 - 171/75)  BP(mean): 94 (15 Mar 2023 12:00) (86 - 103)  RR: 19 (15 Mar 2023 12:00) (14 - 23)  SpO2: 92% (15 Mar 2023 12:00) (91% - 99%)    Parameters below as of 15 Mar 2023 12:00  Patient On (Oxygen Delivery Method): nasal cannula w/ humidification  O2 Flow (L/min): 2    I/Os:   I&O's Detail    14 Mar 2023 07:01  -  15 Mar 2023 07:00  --------------------------------------------------------  IN:    dextrose 5% + lactated ringers: 950 mL    IV PiggyBack: 100 mL    Lactated Ringers Bolus: 500 mL  Total IN: 1550 mL    OUT:    Chest Tube (mL): 5 mL    Voided (mL): 500 mL  Total OUT: 505 mL    Total NET: 1045 mL      15 Mar 2023 07:01  -  15 Mar 2023 13:15  --------------------------------------------------------  IN:    dextrose 5% + lactated ringers: 500 mL  Total IN: 500 mL    OUT:    Chest Tube (mL): 0 mL    Voided (mL): 400 mL  Total OUT: 400 mL    Total NET: 100 mL              MEDICATIONS:  MEDICATIONS  (STANDING):  acetaminophen     Tablet .. 650 milliGRAM(s) Oral every 6 hours  albumin human  5% IVPB 250 milliLiter(s) IV Intermittent once  albuterol    0.083% 2.5 milliGRAM(s) Nebulizer every 6 hours  amLODIPine   Tablet 5 milliGRAM(s) Oral daily  budesonide 160 MICROgram(s)/formoterol 4.5 MICROgram(s) Inhaler 2 Puff(s) Inhalation two times a day  dextrose 5% + lactated ringers. 1000 milliLiter(s) (100 mL/Hr) IV Continuous <Continuous>  finasteride 5 milliGRAM(s) Oral daily  heparin   Injectable 5000 Unit(s) SubCutaneous every 8 hours  latanoprost 0.005% Ophthalmic Solution 1 Drop(s) Both EYES at bedtime  lidocaine   4% Patch 1 Patch Transdermal daily  simvastatin 40 milliGRAM(s) Oral at bedtime  tiotropium 2.5 MICROgram(s) Inhaler 2 Puff(s) Inhalation daily    MEDICATIONS  (PRN):  oxyCODONE    IR 5 milliGRAM(s) Oral every 3 hours PRN Moderate to Severe Pain (4 - 10)      LABS:  Laboratory data was independently reviewed by me today.                           13.1   9.98  )-----------( 235      ( 15 Mar 2023 04:00 )             42.6     03-15    137  |  99  |  20  ----------------------------<  179<H>  4.8   |  22  |  1.41<H>    Ca    9.0      15 Mar 2023 04:00  Phos  3.7     03-15  Mg     2.20     03-15                RADIOLOGY:   Radiology images were independently reviewed by me today. Reports were reviewed by me today.    Xray Chest 1 View AP/PA:   ACC: [id number] EXAM:  XR CHEST PORTABLE ROUTINE 1V   ORDERED BY: MERVIN SILVERMAN     ACC: [id number] EXAM:  XR CHEST AP OR PA 1V   ORDERED BY: ALISSA RODRIGUEZ     ACC: [id number] EXAM:  XR CHEST PORTABLE URGENT 1V   ORDERED BY: ALISSA RODRIGUEZ     PROCEDURE DATE:  03/14/2023          INTERPRETATION:  CLINICAL INDICATION: Preoperative hiatal hernia repair   with cough    EXAM: Frontal radiograph of the chest.    COMPARISON: CT chest from 11/4/2022    FINDINGS:  3/14/2023 at 7:48 PM  ET tube with tip in the proximal right mainstem bronchus.  Left-sided chest tube.  The lungs are clear.  There is no pleural effusion or pneumothorax.  The heart is normal in size  The visualized osseous structures demonstrate no acute pathology.    Diffuse bilateral axillary and left lower neck subcutaneous emphysema.    3/14/2022 10:06 PM  Interval extubation.  Left-sided chest tube remains in place.  No acute interval change in lung exam.  Redemonstrated bilateral axillary and left lower neck subcutaneous   emphysema    3/15/2023 at 5:47 AM  No acute interval change in lung exam.  Improving bilateral axillary and left lower neck subcutaneous emphysema.    IMPRESSION:  Status post hiatal hernia repair with chest tube.    --- End of Report ---          CORKY KIRBY MD; Resident Radiologist  This document has been electronically signed.  TRACIE GAMEZ MD; Attending Radiologist  This document has been electronically signed. Mar 15 2023 10:31AM (03-15-23 @ 06:00)  Xray Chest 1 View- PORTABLE-Urgent:   ACC: [id number] EXAM:  XR CHEST PORTABLE ROUTINE 1V   ORDERED BY: MERVIN SILVERMAN     ACC: [id number] EXAM:  XR CHEST AP OR PA 1V   ORDERED BY: ALISSA RODRIGUEZ     ACC: [id number] EXAM:  XR CHEST PORTABLE URGENT 1V   ORDERED BY: ALISSA RODRIGUEZ     PROCEDURE DATE:  03/14/2023          INTERPRETATION:  CLINICAL INDICATION: Preoperative hiatal hernia repair   with cough    EXAM: Frontal radiograph of the chest.    COMPARISON: CT chest from 11/4/2022    FINDINGS:  3/14/2023 at 7:48 PM  ET tube with tip in the proximal right mainstem bronchus.  Left-sided chest tube.  The lungs are clear.  There is no pleural effusion or pneumothorax.  The heart is normal in size  The visualized osseous structures demonstrate no acute pathology.    Diffuse bilateral axillary and left lower neck subcutaneous emphysema.    3/14/2022 10:06 PM  Interval extubation.  Left-sided chest tube remains in place.  No acute interval change in lung exam.  Redemonstrated bilateral axillary and left lower neck subcutaneous   emphysema    3/15/2023 at 5:47 AM  No acute interval change in lung exam.  Improving bilateral axillary and left lower neck subcutaneous emphysema.    IMPRESSION:  Status post hiatal hernia repair with chest tube.    --- End of Report ---          CORKY KIRBY MD; Resident Radiologist  This document has been electronically signed.  TRACIE GAMEZ MD; Attending Radiologist  This document has been electronically signed. Mar 15 2023 10:31AM (03-14-23 @ 22:32)  Xray Chest 1 View- PORTABLE-Routine:   ACC: [id number] EXAM:  XR CHEST PORTABLE ROUTINE 1V   ORDERED BY: MERVIN SILVERMAN     ACC: [id number] EXAM:  XR CHEST AP OR PA 1V   ORDERED BY: ALISSA RODRIGUEZ     ACC: [id number] EXAM:  XR CHEST PORTABLE URGENT 1V   ORDERED BY: ALISSA RODRIGUEZ     PROCEDURE DATE:  03/14/2023          INTERPRETATION:  CLINICAL INDICATION: Preoperative hiatal hernia repair   with cough    EXAM: Frontal radiograph of the chest.    COMPARISON: CT chest from 11/4/2022    FINDINGS:  3/14/2023 at 7:48 PM  ET tube with tip in the proximal right mainstem bronchus.  Left-sided chest tube.  The lungs are clear.  There is no pleural effusion or pneumothorax.  The heart is normal in size  The visualized osseous structures demonstrate no acute pathology.    Diffuse bilateral axillary and left lower neck subcutaneous emphysema.    3/14/2022 10:06 PM  Interval extubation.  Left-sided chest tube remains in place.  No acute interval change in lung exam.  Redemonstrated bilateral axillary and left lower neck subcutaneous   emphysema    3/15/2023 at 5:47 AM  No acute interval change in lung exam.  Improving bilateral axillary and left lower neck subcutaneous emphysema.    IMPRESSION:  Status post hiatal hernia repair with chest tube.    --- End of Report ---          CORKY KIRBY MD; Resident Radiologist  This document has been electronically signed.  TRACIE GAMEZ MD; Attending Radiologist  This document has been electronically signed. Mar 15 2023 10:31AM (03-14-23 @ 19:53)       CHIEF COMPLAINT: FOLLOW UP IN ICU FOR POSTOPERATIVE CARE OF PATIENT WHO IS S/P HIATAL HERNIA REPAIR      PROCEDURES:                 Robot-assisted hiatal hernia repair, laparoscopic gastropexy 14-Mar-2023      ISSUES:   MORENITA  Recurrent hiatal hernia  Postoperative pain  Chest tube in place  HTN  Crohn's disease  COPD  hx of Lung cancer s/p JULIO segmentectomy (2019)  Glaucoma  BPH      INTERVAL EVENTS:   Chest tube with no airleak  Swallow study with no leak  Started on clear diet  Creatinine worse than baseline this AM.    HISTORY:   Patient reports moderate pain at chest wall incision sites which is worse with coughing and deep breathing without associated fever or dyspnea. Pain is improved with use of pain meds.     PHYSICAL EXAM:   Gen: Comfortable, No acute distress  Eyes: Sclera white, Conjunctiva normal, Eyelids normal, Pupils symmetrical   ENT: Mucous membranes moist,  ,  ,    Neck: Trachea midline,  ,  ,  ,  ,  ,    CV: Rate regular, Rhythm regular,  ,  ,    Resp: Breath sounds clear, No accessory muscles use, L chest tube in place,   Abd: Soft, Non-distended, Non-tender, Bowel sounds normal,  ,  ,    Skin: Warm, No peripheral edema of lower extremities,  ,  subcutaneous emphysema  : No veliz  Neuro: Moving all 4 extremities,    Psych: A&Ox3      ASSESSMENT AND PLAN:     NEURO:  Post-operative Pain - Stable. Pain control with oxycodone and Tylenol IV PRN.          RESPIRATORY:  Hypoxia - Wean nasal cannula for goal O2sat above 92. Obtain CXR. Incentive spirometry. Chest PT and frequent suctioning. Continue bronchodilators. OOB to chair & ambulate w/ assistance. Continuous pulse oximetry for support & to prevent decompensation.    Chest tube – Discontinue chest tube     COPD - worsened by recent thoracic surgery. Continue home inhalers.       CARDIOVASCULAR:  Hemodynamically stable - Not on pressors. Continue hemodynamic monitoring.  Telemetry (medical test) - Reviewed by me today independently. Normal sinus rhythm.      HTN - stable. continue amlodipine.         RENAL:  MORENITA – Renally dose medications. Monitor for hyperkalemia and uremia. Avoid nephrotoxic medications. Monitor IOs and electrolytes. Give IVF. Recheck Creatinine.      BPH - stable. continue finasteride.     GASTROINTESTINAL:  GI prophylaxis not indicated  Zofran and Reglan IV PRN for nausea      Crohn's disease - stable. resume Lialda after discharge.      Recurrent Hiatal hernia - Improved after surgery  Clear liquid diet        HEMATOLOGIC:  No signs of active bleeding. Monitor Hgb in CBC in AM  DVT prophylaxis with heparin subQ and SCDs.           INFECTIOUS DISEASE:  All surgical sites appear clean. No signs of active infection. Will monitor for fever and leukocytosis.           ENDOCRINE:  Stable – Monitor glucose fingersticks for goal 120-180.             Pertinent clinical, laboratory, radiographic, hemodynamic, echocardiographic, respiratory data, microbiologic data and chart were reviewed by myself and analyzed frequently throughout the course of the day and night by myself.    Plan discussed at length with the CTICU staff and Attending CT Surgeon -   Dr Mervin Silverman.    Patient's status was discussed with patient at bedside.    ________________________________________________    _________________________  VITAL SIGNS:  Vital Signs Last 24 Hrs  T(C): 36.6 (15 Mar 2023 08:00), Max: 36.6 (15 Mar 2023 00:00)  T(F): 97.8 (15 Mar 2023 08:00), Max: 97.9 (15 Mar 2023 00:00)  HR: 82 (15 Mar 2023 12:00) (69 - 82)  BP: 151/69 (15 Mar 2023 12:00) (124/90 - 171/75)  BP(mean): 94 (15 Mar 2023 12:00) (86 - 103)  RR: 19 (15 Mar 2023 12:00) (14 - 23)  SpO2: 92% (15 Mar 2023 12:00) (91% - 99%)    Parameters below as of 15 Mar 2023 12:00  Patient On (Oxygen Delivery Method): nasal cannula w/ humidification  O2 Flow (L/min): 2    I/Os:   I&O's Detail    14 Mar 2023 07:01  -  15 Mar 2023 07:00  --------------------------------------------------------  IN:    dextrose 5% + lactated ringers: 950 mL    IV PiggyBack: 100 mL    Lactated Ringers Bolus: 500 mL  Total IN: 1550 mL    OUT:    Chest Tube (mL): 5 mL    Voided (mL): 500 mL  Total OUT: 505 mL    Total NET: 1045 mL      15 Mar 2023 07:01  -  15 Mar 2023 13:15  --------------------------------------------------------  IN:    dextrose 5% + lactated ringers: 500 mL  Total IN: 500 mL    OUT:    Chest Tube (mL): 0 mL    Voided (mL): 400 mL  Total OUT: 400 mL    Total NET: 100 mL              MEDICATIONS:  MEDICATIONS  (STANDING):  acetaminophen     Tablet .. 650 milliGRAM(s) Oral every 6 hours  albumin human  5% IVPB 250 milliLiter(s) IV Intermittent once  albuterol    0.083% 2.5 milliGRAM(s) Nebulizer every 6 hours  amLODIPine   Tablet 5 milliGRAM(s) Oral daily  budesonide 160 MICROgram(s)/formoterol 4.5 MICROgram(s) Inhaler 2 Puff(s) Inhalation two times a day  dextrose 5% + lactated ringers. 1000 milliLiter(s) (100 mL/Hr) IV Continuous <Continuous>  finasteride 5 milliGRAM(s) Oral daily  heparin   Injectable 5000 Unit(s) SubCutaneous every 8 hours  latanoprost 0.005% Ophthalmic Solution 1 Drop(s) Both EYES at bedtime  lidocaine   4% Patch 1 Patch Transdermal daily  simvastatin 40 milliGRAM(s) Oral at bedtime  tiotropium 2.5 MICROgram(s) Inhaler 2 Puff(s) Inhalation daily    MEDICATIONS  (PRN):  oxyCODONE    IR 5 milliGRAM(s) Oral every 3 hours PRN Moderate to Severe Pain (4 - 10)      LABS:  Laboratory data was independently reviewed by me today.                           13.1   9.98  )-----------( 235      ( 15 Mar 2023 04:00 )             42.6     03-15    137  |  99  |  20  ----------------------------<  179<H>  4.8   |  22  |  1.41<H>    Ca    9.0      15 Mar 2023 04:00  Phos  3.7     03-15  Mg     2.20     03-15                RADIOLOGY:   Radiology images were independently reviewed by me today. Reports were reviewed by me today.    Xray Chest 1 View AP/PA:   ACC: [id number] EXAM:  XR CHEST PORTABLE ROUTINE 1V   ORDERED BY: MERVIN SILVERMAN     ACC: [id number] EXAM:  XR CHEST AP OR PA 1V   ORDERED BY: ALISSA RODRIGUEZ     ACC: [id number] EXAM:  XR CHEST PORTABLE URGENT 1V   ORDERED BY: ALISSA RODRIGUEZ     PROCEDURE DATE:  03/14/2023          INTERPRETATION:  CLINICAL INDICATION: Preoperative hiatal hernia repair   with cough    EXAM: Frontal radiograph of the chest.    COMPARISON: CT chest from 11/4/2022    FINDINGS:  3/14/2023 at 7:48 PM  ET tube with tip in the proximal right mainstem bronchus.  Left-sided chest tube.  The lungs are clear.  There is no pleural effusion or pneumothorax.  The heart is normal in size  The visualized osseous structures demonstrate no acute pathology.    Diffuse bilateral axillary and left lower neck subcutaneous emphysema.    3/14/2022 10:06 PM  Interval extubation.  Left-sided chest tube remains in place.  No acute interval change in lung exam.  Redemonstrated bilateral axillary and left lower neck subcutaneous   emphysema    3/15/2023 at 5:47 AM  No acute interval change in lung exam.  Improving bilateral axillary and left lower neck subcutaneous emphysema.    IMPRESSION:  Status post hiatal hernia repair with chest tube.    --- End of Report ---          CORKY KIRBY MD; Resident Radiologist  This document has been electronically signed.  TRACIE GAMEZ MD; Attending Radiologist  This document has been electronically signed. Mar 15 2023 10:31AM (03-15-23 @ 06:00)  Xray Chest 1 View- PORTABLE-Urgent:   ACC: [id number] EXAM:  XR CHEST PORTABLE ROUTINE 1V   ORDERED BY: MERVIN SILVERMAN     ACC: [id number] EXAM:  XR CHEST AP OR PA 1V   ORDERED BY: ALISSA RODRIGUEZ     ACC: [id number] EXAM:  XR CHEST PORTABLE URGENT 1V   ORDERED BY: ALISSA RODRIGUEZ     PROCEDURE DATE:  03/14/2023          INTERPRETATION:  CLINICAL INDICATION: Preoperative hiatal hernia repair   with cough    EXAM: Frontal radiograph of the chest.    COMPARISON: CT chest from 11/4/2022    FINDINGS:  3/14/2023 at 7:48 PM  ET tube with tip in the proximal right mainstem bronchus.  Left-sided chest tube.  The lungs are clear.  There is no pleural effusion or pneumothorax.  The heart is normal in size  The visualized osseous structures demonstrate no acute pathology.    Diffuse bilateral axillary and left lower neck subcutaneous emphysema.    3/14/2022 10:06 PM  Interval extubation.  Left-sided chest tube remains in place.  No acute interval change in lung exam.  Redemonstrated bilateral axillary and left lower neck subcutaneous   emphysema    3/15/2023 at 5:47 AM  No acute interval change in lung exam.  Improving bilateral axillary and left lower neck subcutaneous emphysema.    IMPRESSION:  Status post hiatal hernia repair with chest tube.    --- End of Report ---          CORKY KIRBY MD; Resident Radiologist  This document has been electronically signed.  TRACIE GAMEZ MD; Attending Radiologist  This document has been electronically signed. Mar 15 2023 10:31AM (03-14-23 @ 22:32)  Xray Chest 1 View- PORTABLE-Routine:   ACC: [id number] EXAM:  XR CHEST PORTABLE ROUTINE 1V   ORDERED BY: MERVIN SILVERMAN     ACC: [id number] EXAM:  XR CHEST AP OR PA 1V   ORDERED BY: ALISSA RODRIGUEZ     ACC: [id number] EXAM:  XR CHEST PORTABLE URGENT 1V   ORDERED BY: ALISSA RODRIGUEZ     PROCEDURE DATE:  03/14/2023          INTERPRETATION:  CLINICAL INDICATION: Preoperative hiatal hernia repair   with cough    EXAM: Frontal radiograph of the chest.    COMPARISON: CT chest from 11/4/2022    FINDINGS:  3/14/2023 at 7:48 PM  ET tube with tip in the proximal right mainstem bronchus.  Left-sided chest tube.  The lungs are clear.  There is no pleural effusion or pneumothorax.  The heart is normal in size  The visualized osseous structures demonstrate no acute pathology.    Diffuse bilateral axillary and left lower neck subcutaneous emphysema.    3/14/2022 10:06 PM  Interval extubation.  Left-sided chest tube remains in place.  No acute interval change in lung exam.  Redemonstrated bilateral axillary and left lower neck subcutaneous   emphysema    3/15/2023 at 5:47 AM  No acute interval change in lung exam.  Improving bilateral axillary and left lower neck subcutaneous emphysema.    IMPRESSION:  Status post hiatal hernia repair with chest tube.    --- End of Report ---          CORKY KIRBY MD; Resident Radiologist  This document has been electronically signed.  TRACIE GAMEZ MD; Attending Radiologist  This document has been electronically signed. Mar 15 2023 10:31AM (03-14-23 @ 19:53)

## 2023-03-15 NOTE — DISCHARGE NOTE PROVIDER - NSDCQMPCI_CARD_ALL_CORE
Your calf ultrasound did not show evidence of new or worsening clot  Try rest, elevate and ice to help treat your calf pain 
No

## 2023-03-15 NOTE — DISCHARGE NOTE NURSING/CASE MANAGEMENT/SOCIAL WORK - PATIENT PORTAL LINK FT
You can access the FollowMyHealth Patient Portal offered by [facility] by registering at the following website: [email or web address]. By joining [facility]’s FollowMyHealth portal, you will also be able to view your health information using other applications (apps) compatible with our system.

## 2023-03-15 NOTE — DISCHARGE NOTE PROVIDER - NSDCFUSCHEDAPPT_GEN_ALL_CORE_FT
Shahram Machado  [facility] Physician [facility]  DERM [address]  Scheduled Appointment: 03/27/2023    Cricket Sanchez  [facility] Physician [facility]  UROLOGY [address] [facility] [facility]  Scheduled Appointment: 03/28/2023    Dwayne Bbo  [facility] Physician [facility]  INTMED [address]  Scheduled Appointment: 04/17/2023    Volodymyr Pearce  [facility] Physician [facility]  PULMMED [address]  Scheduled Appointment: 04/25/2023

## 2023-03-15 NOTE — PROGRESS NOTE ADULT - SUBJECTIVE AND OBJECTIVE BOX
Anesthesia Pain Management Service    SUBJECTIVE: Patient is doing well with IV PCA and no significant problems reported.    Pain Scale Score	At rest: _4/10__ 	With Activity: ___ 	[X ] Refer to charted pain scores    THERAPY:    [ ] IV PCA Morphine		[ ] 5 mg/mL	[ ] 1 mg/mL  [X ] IV PCA Hydromorphone	[ ] 5 mg/mL	[X ] 1 mg/mL  [ ] IV PCA Fentanyl		[ ] 50 micrograms/mL    Demand dose __0.2_ lockout __6_ (minutes) Continuous Rate _0__ Total: __0_  mg used (in past 24 hours)      MEDICATIONS  (STANDING):  amLODIPine   Tablet 5 milliGRAM(s) Oral daily  budesonide 160 MICROgram(s)/formoterol 4.5 MICROgram(s) Inhaler 2 Puff(s) Inhalation two times a day  dextrose 5% + lactated ringers. 1000 milliLiter(s) (100 mL/Hr) IV Continuous <Continuous>  finasteride 5 milliGRAM(s) Oral daily  heparin   Injectable 5000 Unit(s) SubCutaneous every 8 hours  HYDROmorphone PCA (1 mG/mL) 30 milliLiter(s) PCA Continuous PCA Continuous  latanoprost 0.005% Ophthalmic Solution 1 Drop(s) Both EYES at bedtime  simvastatin 40 milliGRAM(s) Oral at bedtime  tiotropium 2.5 MICROgram(s) Inhaler 2 Puff(s) Inhalation daily    MEDICATIONS  (PRN):  HYDROmorphone PCA (1 mG/mL) Rescue Clinician Bolus 0.5 milliGRAM(s) IV Push every 15 minutes PRN for Pain Scale GREATER THAN 6  ondansetron Injectable 4 milliGRAM(s) IV Push every 6 hours PRN Nausea      OBJECTIVE: Patient sitting up in chair.    Sedation Score:	[ X] Alert	[ ] Drowsy 	[ ] Arousable	[ ] Asleep	[ ] Unresponsive    Side Effects:	[X ] None	[ ] Nausea	[ ] Vomiting	[ ] Pruritus  		[ ] Other:    Vital Signs Last 24 Hrs  T(C): 36.6 (15 Mar 2023 08:00), Max: 36.6 (15 Mar 2023 00:00)  T(F): 97.8 (15 Mar 2023 08:00), Max: 97.9 (15 Mar 2023 00:00)  HR: 79 (15 Mar 2023 11:00) (69 - 81)  BP: 142/69 (15 Mar 2023 11:00) (124/90 - 171/75)  BP(mean): 91 (15 Mar 2023 11:00) (86 - 103)  RR: 21 (15 Mar 2023 11:00) (14 - 23)  SpO2: 95% (15 Mar 2023 11:00) (91% - 99%)    Parameters below as of 15 Mar 2023 11:00  Patient On (Oxygen Delivery Method): nasal cannula w/ humidification  O2 Flow (L/min): 2      ASSESSMENT/ PLAN    Therapy to  be:	[ X] Continue   [ ] Discontinued   [ ] Change to prn Analgesics    Documentation and Verification of current medications:   [X] Done	[ ] Not done, not elligible    Comments: Continue IV PCA. Will transition to PO pain regimen if patient tolerates clear liquid diet today after barium swallow. Recommend non-opioid adjuvant analgesics to be used when possible and when allowed by primary surgical team.    Progress Note written now but Patient was seen earlier.

## 2023-03-15 NOTE — DISCHARGE NOTE PROVIDER - CARE PROVIDER_API CALL
Mervin Silverman (MD)  Surgery; Thoracic Surgery  [phone] [place] [address] [place] [address]  Phone: [phone]  Fax: [phone]  Follow Up Time:

## 2023-03-15 NOTE — DISCHARGE NOTE PROVIDER - NSDCMRMEDTOKEN_GEN_ALL_CORE_FT
amLODIPine 5 mg oral tablet: 1 tab(s) orally once a day  ferrous sulfate 325 mg (65 mg elemental iron) oral tablet: 1 tab(s) orally once a day  finasteride 5 mg oral tablet: 1 tab(s) orally once a day  Lialda 1.2 g oral delayed release tablet: 2 tab(s) orally once a day- AM  Multiple Vitamins oral tablet: 1 tab(s) orally once a day; last dose 03/06  pantoprazole 40 mg oral delayed release tablet: 1 tab(s) orally 2 times a day  simvastatin 40 mg oral tablet: 1 tab(s) orally once a day  Spiriva Respimat 10 ACT 2.5 mcg/inh inhalation aerosol: 2 puff(s) inhaled once a day  sucralfate 1 g/10 mL oral suspension: 10 milliliter(s) orally 4 times a day (before meals and at bedtime)  Symbicort 160 mcg-4.5 mcg/inh inhalation aerosol: 2 puff(s) inhaled 2 times a day  vitamin d: 1 tab(s) orally once a day  Xalatan 0.005% ophthalmic solution: 1 drop(s) to each affected eye once a day (in the evening)   amLODIPine 5 mg oral tablet: 1 tab(s) orally once a day  CXR: CXR PA and Lateral Dx: Lung nodule Please fax results to [phone].   ferrous sulfate 325 mg (65 mg elemental iron) oral tablet: 1 tab(s) orally once a day  finasteride 5 mg oral tablet: 1 tab(s) orally once a day  Lialda 1.2 g oral delayed release tablet: 2 tab(s) orally once a day- AM  Multiple Vitamins oral tablet: 1 tab(s) orally once a day; last dose 03/06  oxyCODONE 5 mg oral tablet: 1 tab(s) orally every 4 hours (5 times/day), As Needed -for moderate pain MDD:5  pantoprazole 40 mg oral delayed release tablet: 1 tab(s) orally 2 times a day  simvastatin 40 mg oral tablet: 1 tab(s) orally once a day  Spiriva Respimat 10 ACT 2.5 mcg/inh inhalation aerosol: 2 puff(s) inhaled once a day  sucralfate 1 g/10 mL oral suspension: 10 milliliter(s) orally 4 times a day (before meals and at bedtime)  Symbicort 160 mcg-4.5 mcg/inh inhalation aerosol: 2 puff(s) inhaled 2 times a day  vitamin d: 1 tab(s) orally once a day  Xalatan 0.005% ophthalmic solution: 1 drop(s) to each affected eye once a day (in the evening)

## 2023-03-16 ENCOUNTER — NON-APPOINTMENT (OUTPATIENT)
Age: 78
End: 2023-03-16

## 2023-03-16 NOTE — H&P PST ADULT - NEGATIVE ENDOCRINE SYMPTOMS
[address] SCAN  Name: Leon Da Silva  MRN: [id number]  : Jarred Licea MD  OB/GYN PGY-3  3/16/2023  8:58 AM no cold intolerance/no heat intolerance

## 2023-03-27 ENCOUNTER — APPOINTMENT (OUTPATIENT)
Dept: DERMATOLOGY | Facility: CLINIC | Age: 78
End: 2023-03-27
Payer: MEDICARE

## 2023-03-27 ENCOUNTER — NON-APPOINTMENT (OUTPATIENT)
Age: 78
End: 2023-03-27

## 2023-03-27 DIAGNOSIS — L85.3 XEROSIS CUTIS: ICD-10-CM

## 2023-03-27 DIAGNOSIS — L82.1 OTHER SEBORRHEIC KERATOSIS: ICD-10-CM

## 2023-03-27 DIAGNOSIS — D22.9 MELANOCYTIC NEVI, UNSPECIFIED: ICD-10-CM

## 2023-03-27 DIAGNOSIS — Z12.83 ENCOUNTER FOR SCREENING FOR MALIGNANT NEOPLASM OF SKIN: ICD-10-CM

## 2023-03-27 PROCEDURE — 99213 OFFICE O/P EST LOW 20 MIN: CPT

## 2023-03-28 ENCOUNTER — APPOINTMENT (OUTPATIENT)
Dept: UROLOGY | Facility: CLINIC | Age: 78
End: 2023-03-28

## 2023-03-30 ENCOUNTER — OUTPATIENT (OUTPATIENT)
Dept: OUTPATIENT SERVICES | Facility: HOSPITAL | Age: 78
LOS: 1 days | End: 2023-03-30
Payer: MEDICARE

## 2023-03-30 ENCOUNTER — RESULT REVIEW (OUTPATIENT)
Age: 78
End: 2023-03-30

## 2023-03-30 ENCOUNTER — APPOINTMENT (OUTPATIENT)
Dept: RADIOLOGY | Facility: HOSPITAL | Age: 78
End: 2023-03-30

## 2023-03-30 ENCOUNTER — APPOINTMENT (OUTPATIENT)
Dept: THORACIC SURGERY | Facility: CLINIC | Age: 78
End: 2023-03-30
Payer: MEDICARE

## 2023-03-30 VITALS
HEART RATE: 60 BPM | OXYGEN SATURATION: 95 % | HEIGHT: 63 IN | SYSTOLIC BLOOD PRESSURE: 156 MMHG | DIASTOLIC BLOOD PRESSURE: 84 MMHG | BODY MASS INDEX: 25.16 KG/M2 | WEIGHT: 142 LBS

## 2023-03-30 DIAGNOSIS — Z98.890 OTHER SPECIFIED POSTPROCEDURAL STATES: Chronic | ICD-10-CM

## 2023-03-30 DIAGNOSIS — C34.92 MALIGNANT NEOPLASM OF UNSPECIFIED PART OF LEFT BRONCHUS OR LUNG: ICD-10-CM

## 2023-03-30 DIAGNOSIS — K46.9 UNSPECIFIED ABDOMINAL HERNIA W/OUT OBSTRUCTION OR GANGRENE: ICD-10-CM

## 2023-03-30 PROCEDURE — 71046 X-RAY EXAM CHEST 2 VIEWS: CPT | Mod: 26

## 2023-03-30 PROCEDURE — 99024 POSTOP FOLLOW-UP VISIT: CPT

## 2023-03-30 NOTE — DISCUSSION/SUMMARY
[Doing Well] : is doing well [Excellent Pain Control] : has excellent pain control [No Sign of Infection] : is showing no signs of infection [Remove Sutures/Staples] : removed sutures/staples [Dressing Change] : dressing was changed [Clinical Recheck] : clinical recheck

## 2023-03-31 NOTE — CONSULT LETTER
[Dear  ___] : Dear  [unfilled], [Consult Letter:] : I had the pleasure of evaluating your patient, [unfilled]. [Please see my note below.] : Please see my note below. [Consult Closing:] : Thank you very much for allowing me to participate in the care of this patient.  If you have any questions, please do not hesitate to contact me. [Sincerely,] : Sincerely, [FreeTextEntry2] : Dr. Timmy Vidales (GI/Referring)\par Dr. Volodymyr Pearce (Pulm)\par Dr. Dwayne Bob (PCP) \par Dr. Jose De Guzman (Hem/Onc)  [FreeTextEntry3] : Mervin Silverman MD, MPH \par System Director of Thoracic Surgery \par Director of Comprehensive Lung and Foregut [place] \par Professor Cardiovascular & Thoracic Surgery  \par [facility] [employer] [facility]\par \par [facility]\par [address]\par Oncology [address]\[place] [address]\par Tel: [phone]\par Fax: [phone]\par

## 2023-03-31 NOTE — REASON FOR VISIT
[de-identified] : EGD, redo, Laparoscopy, R.A. extensive lysis of intraabdominal adhesion, repair of recurrent hiatal hernia, gastropexy, placement of Lt chest tube [de-identified] : 3/14/23

## 2023-03-31 NOTE — PHYSICAL EXAM
[] : no respiratory distress [Respiration, Rhythm And Depth] : normal respiratory rhythm and effort [Auscultation Breath Sounds / Voice Sounds] : lungs were clear to auscultation bilaterally [Apical Impulse] : the apical impulse was normal [Heart Rate And Rhythm] : heart rate was normal and rhythm regular [Heart Sounds] : normal S1 and S2 [Heart Sounds Gallop] : no gallops [Site: ___] : Site: [unfilled] [Clean] : clean [Dry] : dry [Healing Well] : healing well [No Edema] : no edema [Bleeding] : no active bleeding [Foul Odor] : no foul smell [Purulent Drainage] : no purulent drainage [Serosanguinous Drainage] : no serosanguinous drainage [Erythema] : not erythematous [Warm] : not warm [Tender] : not tender [FreeTextEntry1] : suture removal of Left chest tube insertion site

## 2023-03-31 NOTE — ASSESSMENT
[FreeTextEntry1] : Mr. DEUCE SIDHU, 77 year old male, former smoker, w/ hx of HTN, HLD, COPD, Crohn's disease, Stg I T1aN0 Lung AdenoCA s/p JULIO segmentectomy on 2/4/19 by Dr. Billings at [facility], who referred by Dr. Timmy Vidales (GI) for hiatal hernia with chronic ulcerative esophagitis. \par \par CT Chest on 8/11/2020 showed calcified lung nodules; a stable 6mm LLL nodule (3:109); s/p LULobectomy; unremarkable abdomen.\par \par EGD on 08/19/2020. Path of EGJ bx revealed segment of inflammatory exudate. Cardia mucosa with mild to moderate chronic inflammation and vascular congestion. Path of esophagus bx revealed extensive ulcerative acute esophagitis with inflammatory exudate and regions of granulation tissue. Duodenal bx revealed moderate chronic active duodenitis. \par \par EGD on 11/11/2020. Path of duodenal bulb bx revealed duodenal mucosa with Brunner's gland hyperplasia. Peptic duodenitis. GEJ bx revealed squamocolumnar mucosa with acute and chronic inflammation. Esophageus bx revealed ulcerative esophagitis involving squamous epithelium. \par \par EGD on 3/24/21 by Dr. Timmy Vidales showed a 5 cm hiatal hernia; mild non-erosive gastritis; mild portal gastropathy. EG junction is irregular at 34cm w/ ulcerations extending to 32cm. Path showed chronic gastritis, negative H. Pylori; GE mucosa showed mild active chronic inflammation, suggestive of reflux disease; negative for intestinal metaplasia. Bx of esophageal ulcers at 32cm showed acute ulcerating to chronic inflammation, moderate to severe, w/ surface inflammatory exudate, no evidence of fungal organisms. Negative for HSV I/II and CMV.\par \par Manometry on 6/23/21 revealed no adequate peristalsis, 40% of swallows are supine and 60% are absent, Ineffective esophageal motility with 4.2 cm hiatal hernia. \par \par Barium esophagram on 7/8/21: showed moderate hiatal hernia with GE reflux. \par \par Now 1 yr 8mo s/p EGD, laparoscopic, robotic-assisted, repair of hiatal hernia, Toupet fundoplication, repair of umbilical hernia on 7/12/21. Path revealed benign fibroconnective and adipose tissue, c/w hernia sac. \par \par CT chest was ordered by Dr. De Guzman (Hem/Onc) due to hx of lung cancer. \par \par CT chest on 11/03/2021:\par - Status post left upper lobectomy. \par - Emphysema is present. The central airways are patent. \par - An 8 mm left lower lobe nodule (3:105) is unchanged since 2016.\par - Small hiatal hernia.\par \par Barium esophagram on 5/5/22:\par - small sliding hiatal hernia \par \par Barium esophagram on 11/3/22:\par - there was delayed emptying into the stomach (10 minutes) at the gastroesophageal junction\par - sliding hiatal hernia\par \par CT Chest on 11/4/22:\par - post-op changes\par - stable 8 mm LLL\par \par 12/1/22: Discussed with pt's GI Dr. Vidales, most recent EGD showed a lot of acid and esophagitis and ulceration even though pt has no symptoms. I would like to take a look of myself, will schedule EGD on 12/22/22. \par \par EGD with biopsy on 1/19/23: \par - Z- line located at 34 cm from the incisor. The diaphragmatic pinch is at 39 cm\par - Recurrent 5 cm hiatal hernia. I entered the stomach\par - Hill's grade IV hiatal hernia seen. Multiple biopsies were performed at 34 cm. There was evidence of esophagitis seen that looked quite severe. Path - Esophagus at 34 cm: Squamous epithelium with scant focal parakeratosis. \par \par Now s/p EGD, redo, Laparoscopy, R.A. extensive lysis of intraabdominal adhesion, repair of recurrent hiatal hernia, gastropexy, placement of Lt chest tube on 3/14/23.\par \par CXR today 3/30/23 revealed no acute disease.\par \par He is doing well after surgery, on soft diet, tolerating well, denies N/V, abd pain, acid reflux. \par \par \par I have reviewed the patient's medical records and diagnostic images at time of this office consultation and have made the following recommendation:\par 1. Doing well after surgery. \par 2. RTC in 3 months with CXR. \par \par \par \par I, ASHLEY Verdugo, personally performed the evaluation and management (E/M) services for this established patient who presents today with (a) new problem(s)/exacerbation of (an) existing condition(s).  That E/M includes conducting the examination, assessing all new/exacerbated conditions, and establishing a new plan of care.  Today, my ACP, Pari Pavon NP was here to observe my evaluation and management services for this new problem/exacerbated condition to be followed going forward.\par \par \par

## 2023-04-17 ENCOUNTER — APPOINTMENT (OUTPATIENT)
Dept: INTERNAL MEDICINE | Facility: CLINIC | Age: 78
End: 2023-04-17
Payer: MEDICARE

## 2023-04-17 VITALS
SYSTOLIC BLOOD PRESSURE: 140 MMHG | HEIGHT: 63 IN | BODY MASS INDEX: 25.87 KG/M2 | HEART RATE: 62 BPM | TEMPERATURE: 97.2 F | OXYGEN SATURATION: 99 % | DIASTOLIC BLOOD PRESSURE: 70 MMHG | WEIGHT: 146 LBS

## 2023-04-17 DIAGNOSIS — Z00.00 ENCOUNTER FOR GENERAL ADULT MEDICAL EXAMINATION W/OUT ABNORMAL FINDINGS: ICD-10-CM

## 2023-04-17 PROCEDURE — G0009: CPT

## 2023-04-17 PROCEDURE — 90677 PCV20 VACCINE IM: CPT

## 2023-04-17 PROCEDURE — 36415 COLL VENOUS BLD VENIPUNCTURE: CPT

## 2023-04-17 PROCEDURE — G0439: CPT

## 2023-04-17 RX ORDER — CLOMIPHENE CITRATE 50 MG/1
50 TABLET ORAL
Qty: 15 | Refills: 11 | Status: DISCONTINUED | COMMUNITY
Start: 2021-12-22 | End: 2023-04-17

## 2023-04-17 NOTE — ASSESSMENT
[FreeTextEntry1] : discussed w pt\par \par reviewed updated hx \par \par he did well after revision of hiatal hernia repair w Dr Silverman , continues PPI. he will cont f/u w Dr Vidales GI, monitoring EGD \par \par cont current rx \par \par monitor elevated PSA, s/p benign prostate biopsy , urology f/u advised \par \par doing well since lung cancer surgery, stopped smoking, regained weight\par \par reviewed vaccinations\par Prevnar 20 vaccine discussed and administered today \par \par check routine labs as below \par \par RTO 6 months for f/u or earlier prn if any new concerns

## 2023-04-17 NOTE — PHYSICAL EXAM
[No Acute Distress] : no acute distress [Well Nourished] : well nourished [Well Developed] : well developed [Well-Appearing] : well-appearing [Normal Voice/Communication] : normal voice/communication [Normal Sclera/Conjunctiva] : normal sclera/conjunctiva [PERRL] : pupils equal round and reactive to light [Normal Outer Ear/Nose] : the outer ears and nose were normal in appearance [Normal Oropharynx] : the oropharynx was normal [Normal TMs] : both tympanic membranes were normal [No JVD] : no jugular venous distention [No Lymphadenopathy] : no lymphadenopathy [Supple] : supple [Thyroid Normal, No Nodules] : the thyroid was normal and there were no nodules present [No Respiratory Distress] : no respiratory distress  [No Accessory Muscle Use] : no accessory muscle use [Clear to Auscultation] : lungs were clear to auscultation bilaterally [Normal Rate] : normal rate  [Regular Rhythm] : with a regular rhythm [Normal S1, S2] : normal S1 and S2 [No Murmur] : no murmur heard [No Carotid Bruits] : no carotid bruits [No Abdominal Bruit] : a ~M bruit was not heard ~T in the abdomen [No Varicosities] : no varicosities [No Edema] : there was no peripheral edema [No Palpable Aorta] : no palpable aorta [No Extremity Clubbing/Cyanosis] : no extremity clubbing/cyanosis [Soft] : abdomen soft [Non Tender] : non-tender [Non-distended] : non-distended [No Masses] : no abdominal mass palpated [No HSM] : no HSM [Normal Bowel Sounds] : normal bowel sounds [Declined Rectal Exam] : declined rectal exam [No Spinal Tenderness] : no spinal tenderness [No Joint Swelling] : no joint swelling [Grossly Normal Strength/Tone] : grossly normal strength/tone [No Rash] : no rash [Coordination Grossly Intact] : coordination grossly intact [No Focal Deficits] : no focal deficits [Normal Gait] : normal gait [Speech Grossly Normal] : speech grossly normal [Normal Affect] : the affect was normal [Alert and Oriented x3] : oriented to person, place, and time [Normal Mood] : the mood was normal [Normal Insight/Judgement] : insight and judgment were intact [Normal Supraclavicular Nodes] : no supraclavicular lymphadenopathy [Normal Posterior Cervical Nodes] : no posterior cervical lymphadenopathy [Normal Anterior Cervical Nodes] : no anterior cervical lymphadenopathy [de-identified] : mildly reduced BS b/l  [de-identified] : mildly reduced pedal pulses b/l

## 2023-04-17 NOTE — HISTORY OF PRESENT ILLNESS
[de-identified] : 76 y/o man presents for annual physical exam. he feels well overall currently. reviewed recent updates. \par \par recovered well after recent revision of hiatal hernia repair with Dr Silverman. \par continues PPI for chronic GERD \par HTN controlled on rx \par \par \par monitoring elevated PSA, following w Dr Sanchez and he underwent prostate bx with benign findings 2022. \par  mild anemia resolved  \par continues f/u w Dr Vidales GI for chronic GERD,  EGD showed esophageal ulceration. also had updated colonoscopy\par \par COPD/emphysema stable, using inhalers only intermittently for maintenance, following w Dr Pearce , stopped smoking completely \par s/p L lung adenocarcinoma resection w Dr Billings at [facility] without complications. \par BPH symptoms relatively controlled\par Crohns disease stable and asymptomatic on Lialda following w Dr Timmy Vidales GI\par \par asymptomatic COVID infection in 12/22 , no residual concerns

## 2023-04-17 NOTE — HEALTH RISK ASSESSMENT
[No] : In the past 12 months have you used drugs other than those required for medical reasons? No [No falls in past year] : Patient reported no falls in the past year [0] : 2) Feeling down, depressed, or hopeless: Not at all (0) [None] : None [Employed] : employed [] :  [Fully functional (bathing, dressing, toileting, transferring, walking, feeding)] : Fully functional (bathing, dressing, toileting, transferring, walking, feeding) [Fully functional (using the telephone, shopping, preparing meals, housekeeping, doing laundry, using] : Fully functional and needs no help or supervision to perform IADLs (using the telephone, shopping, preparing meals, housekeeping, doing laundry, using transportation, managing medications and managing finances) [[id number]] : 0 [ColonoscopyDate] : 2021 [Former] : Former

## 2023-04-17 NOTE — REVIEW OF SYSTEMS
[Hesitancy] : hesitancy [Nocturia] : nocturia [Negative] : Heme/Lymph [Shortness Of Breath] : no shortness of breath [Wheezing] : no wheezing [Cough] : no cough [Dyspnea on Exertion] : not dyspnea on exertion [Dysuria] : no dysuria [Incontinence] : no incontinence [Hematuria] : no hematuria [Frequency] : no frequency

## 2023-04-25 ENCOUNTER — APPOINTMENT (OUTPATIENT)
Dept: PULMONOLOGY | Facility: CLINIC | Age: 78
End: 2023-04-25
Payer: MEDICARE

## 2023-04-25 VITALS
DIASTOLIC BLOOD PRESSURE: 83 MMHG | OXYGEN SATURATION: 95 % | RESPIRATION RATE: 16 BRPM | SYSTOLIC BLOOD PRESSURE: 151 MMHG | HEART RATE: 58 BPM

## 2023-04-25 PROCEDURE — 99213 OFFICE O/P EST LOW 20 MIN: CPT | Mod: 25

## 2023-04-25 PROCEDURE — 71046 X-RAY EXAM CHEST 2 VIEWS: CPT

## 2023-04-25 PROCEDURE — 94010 BREATHING CAPACITY TEST: CPT

## 2023-04-25 RX ORDER — BUDESONIDE AND FORMOTEROL FUMARATE DIHYDRATE 160; 4.5 UG/1; UG/1
160-4.5 AEROSOL RESPIRATORY (INHALATION) TWICE DAILY
Qty: 3 | Refills: 1 | Status: ACTIVE | COMMUNITY
Start: 2020-02-19 | End: 1900-01-01

## 2023-04-25 RX ORDER — FERROUS SULFATE 325(65) MG
325 TABLET ORAL
Refills: 0 | Status: ACTIVE | COMMUNITY

## 2023-04-25 RX ORDER — PANTOPRAZOLE 40 MG/1
40 TABLET, DELAYED RELEASE ORAL
Refills: 0 | Status: ACTIVE | COMMUNITY

## 2023-04-25 RX ORDER — BIOTIN 10 MG
TABLET ORAL
Refills: 0 | Status: COMPLETED | COMMUNITY
End: 2023-04-25

## 2023-04-27 NOTE — HISTORY OF PRESENT ILLNESS
[TextBox_4] : COPD f/u\par \par Denies SOB or cough; overall doing well no change in shortness of breath\par \par Continues to take Symbicort and Spiriva\par \par Had hiatal hernia repair and CT (@ [facility])  in March

## 2023-04-27 NOTE — ASSESSMENT
[FreeTextEntry1] : Status post surgical repair of hiatal hernia.  Pleural effusion noted on right after surgery resolved on follow-up x-ray.  Return for routine follow-up in 3 months continue current medications

## 2023-05-08 ENCOUNTER — RX RENEWAL (OUTPATIENT)
Age: 78
End: 2023-05-08

## 2023-06-15 LAB
ALBUMIN SERPL ELPH-MCNC: 4.2 G/DL
ALP BLD-CCNC: 83 U/L
ALT SERPL-CCNC: 7 U/L
ANION GAP SERPL CALC-SCNC: 13 MMOL/L
APPEARANCE: CLEAR
AST SERPL-CCNC: 16 U/L
BASOPHILS # BLD AUTO: 0.04 K/UL
BASOPHILS NFR BLD AUTO: 0.8 %
BILIRUB SERPL-MCNC: 0.5 MG/DL
BILIRUBIN URINE: NEGATIVE
BLOOD URINE: NEGATIVE
BUN SERPL-MCNC: 16 MG/DL
CALCIUM SERPL-MCNC: 9.5 MG/DL
CHLORIDE SERPL-SCNC: 99 MMOL/L
CHOLEST SERPL-MCNC: 239 MG/DL
CO2 SERPL-SCNC: 27 MMOL/L
COLOR: NORMAL
CREAT SERPL-MCNC: 1.18 MG/DL
EGFR: 63 ML/MIN/1.73M2
EOSINOPHIL # BLD AUTO: 0.18 K/UL
EOSINOPHIL NFR BLD AUTO: 3.7 %
ESTIMATED AVERAGE GLUCOSE: 131 MG/DL
FERRITIN SERPL-MCNC: 62 NG/ML
GLUCOSE QUALITATIVE U: NEGATIVE
GLUCOSE SERPL-MCNC: 96 MG/DL
HBA1C MFR BLD HPLC: 6.2 %
HCT VFR BLD CALC: 43.5 %
HDLC SERPL-MCNC: 75 MG/DL
HGB BLD-MCNC: 13.6 G/DL
IMM GRANULOCYTES NFR BLD AUTO: 0.2 %
IRON SATN MFR SERPL: 15 %
IRON SERPL-MCNC: 46 UG/DL
KETONES URINE: NEGATIVE
LDLC SERPL CALC-MCNC: 147 MG/DL
LEUKOCYTE ESTERASE URINE: NEGATIVE
LYMPHOCYTES # BLD AUTO: 0.67 K/UL
LYMPHOCYTES NFR BLD AUTO: 13.8 %
MAN DIFF?: NORMAL
MCHC RBC-ENTMCNC: 31 PG
MCHC RBC-ENTMCNC: 31.3 GM/DL
MCV RBC AUTO: 99.1 FL
MONOCYTES # BLD AUTO: 0.46 K/UL
MONOCYTES NFR BLD AUTO: 9.5 %
NEUTROPHILS # BLD AUTO: 3.49 K/UL
NEUTROPHILS NFR BLD AUTO: 72 %
NITRITE URINE: NEGATIVE
NONHDLC SERPL-MCNC: 164 MG/DL
PH URINE: 6.5
PLATELET # BLD AUTO: 202 K/UL
POTASSIUM SERPL-SCNC: 4.7 MMOL/L
PROT SERPL-MCNC: 6.8 G/DL
PROTEIN URINE: NEGATIVE
PSA SERPL-MCNC: 5.96 NG/ML
RBC # BLD: 4.39 M/UL
RBC # FLD: 15.3 %
SODIUM SERPL-SCNC: 139 MMOL/L
SPECIFIC GRAVITY URINE: 1.01
T4 FREE SERPL-MCNC: 1.3 NG/DL
TIBC SERPL-MCNC: 314 UG/DL
TRIGL SERPL-MCNC: 86 MG/DL
TSH SERPL-ACNC: 0.95 UIU/ML
UIBC SERPL-MCNC: 268 UG/DL
UROBILINOGEN URINE: NORMAL
WBC # FLD AUTO: 4.85 K/UL

## 2023-06-29 ENCOUNTER — APPOINTMENT (OUTPATIENT)
Dept: THORACIC SURGERY | Facility: CLINIC | Age: 78
End: 2023-06-29
Payer: MEDICARE

## 2023-06-30 ENCOUNTER — APPOINTMENT (OUTPATIENT)
Dept: RADIOLOGY | Facility: HOSPITAL | Age: 78
End: 2023-06-30

## 2023-06-30 ENCOUNTER — APPOINTMENT (OUTPATIENT)
Dept: THORACIC SURGERY | Facility: CLINIC | Age: 78
End: 2023-06-30
Payer: MEDICARE

## 2023-06-30 ENCOUNTER — OUTPATIENT (OUTPATIENT)
Dept: OUTPATIENT SERVICES | Facility: HOSPITAL | Age: 78
LOS: 1 days | End: 2023-06-30
Payer: MEDICARE

## 2023-06-30 VITALS
HEART RATE: 74 BPM | WEIGHT: 137 LBS | OXYGEN SATURATION: 92 % | SYSTOLIC BLOOD PRESSURE: 175 MMHG | DIASTOLIC BLOOD PRESSURE: 83 MMHG | BODY MASS INDEX: 24.27 KG/M2 | RESPIRATION RATE: 14 BRPM

## 2023-06-30 DIAGNOSIS — Z98.890 OTHER SPECIFIED POSTPROCEDURAL STATES: Chronic | ICD-10-CM

## 2023-06-30 DIAGNOSIS — K44.9 DIAPHRAGMATIC HERNIA WITHOUT OBSTRUCTION OR GANGRENE: ICD-10-CM

## 2023-06-30 DIAGNOSIS — Z90.89 ACQUIRED ABSENCE OF OTHER ORGANS: Chronic | ICD-10-CM

## 2023-06-30 PROCEDURE — 99213 OFFICE O/P EST LOW 20 MIN: CPT

## 2023-06-30 PROCEDURE — 71046 X-RAY EXAM CHEST 2 VIEWS: CPT | Mod: 26

## 2023-06-30 NOTE — PHYSICAL EXAM
[] : no respiratory distress [Auscultation Breath Sounds / Voice Sounds] : lungs were clear to auscultation bilaterally [Heart Rate And Rhythm] : heart rate was normal and rhythm regular [Heart Sounds] : normal S1 and S2 [Heart Sounds Gallop] : no gallops [Murmurs] : no murmurs [Heart Sounds Pericardial Friction Rub] : no pericardial rub [Bowel Sounds] : normal bowel sounds [Abdomen Soft] : soft [Abdomen Mass (___ Cm)] : no abdominal mass palpated

## 2023-06-30 NOTE — ASSESSMENT
[FreeTextEntry1] : Mr. DEUCE SIDHU, 77 year old male, former smoker, w/ hx of HTN, HLD, COPD, Crohn's disease, Stg I T1aN0 Lung AdenoCA s/p JULIO segmentectomy on 2/4/19 by Dr. Billings at [facility], who referred by Dr. Timmy Vidales (GI) for hiatal hernia with chronic ulcerative esophagitis. \par \par Now 2 yrs s/p EGD, laparoscopic, robotic-assisted, repair of hiatal hernia, Toupet fundoplication, repair of umbilical hernia on 7/12/21. Path revealed benign fibroconnective and adipose tissue, c/w hernia sac. \par \par Now 3.5 mo s/p EGD, redo, Laparoscopy, R.A. extensive lysis of intraabdominal adhesion, repair of recurrent hiatal hernia, gastropexy, placement of Lt chest tube on 3/14/23.\par \par \par I have reviewed the patient's medical records and diagnostic images at time of this office consultation and have made the following recommendation:\par 1.  RTC in Dec with Barium esophagram and CT Chest w/o contrast (CT will be ordered and scheduled by Hem/Onc Dr. De Guzman).\par \par

## 2023-06-30 NOTE — CONSULT LETTER
[FreeTextEntry2] : Dr. Timmy Vidales (GI/Referring)\par Dr. Volodymyr Pearce (Pulm)\par Dr. Dwayne Bob (PCP) \par Dr. Jose De Guzman (Hem/Onc)  [FreeTextEntry3] : Mervin Silverman MD, MPH \par System Director of Thoracic Surgery \par Director of Comprehensive Lung and Foregut [place] \par Professor Cardiovascular & Thoracic Surgery  \par [facility] [employer] [facility]\par \par [facility]\par [address]\par Oncology [address]\[place] [address]\par Tel: [phone]\par Fax: [phone]\par

## 2023-06-30 NOTE — HISTORY OF PRESENT ILLNESS
[FreeTextEntry1] : Mr. DEUCE SIDHU, 77 year old male, former smoker, w/ hx of HTN, HLD, COPD, Crohn's disease, Stg I T1aN0 Lung AdenoCA s/p JULIO segmentectomy on 2/4/19 by Dr. Billings at [facility], who referred by Dr. Timmy Vidales (GI) for hiatal hernia with chronic ulcerative esophagitis. \par \par CT Chest on 8/11/2020 showed calcified lung nodules; a stable 6mm LLL nodule (3:109); s/p LULobectomy; unremarkable abdomen.\par \par EGD on 08/19/2020. Path of EGJ bx revealed segment of inflammatory exudate. Cardia mucosa with mild to moderate chronic inflammation and vascular congestion. Path of esophagus bx revealed extensive ulcerative acute esophagitis with inflammatory exudate and regions of granulation tissue. Duodenal bx revealed moderate chronic active duodenitis. \par \par EGD on 11/11/2020. Path of duodenal bulb bx revealed duodenal mucosa with Brunner's gland hyperplasia. Peptic duodenitis. GEJ bx revealed squamocolumnar mucosa with acute and chronic inflammation. Esophageus bx revealed ulcerative esophagitis involving squamous epithelium. \par \par EGD on 3/24/21 by Dr. Timmy Vidales showed a 5 cm hiatal hernia; mild non-erosive gastritis; mild portal gastropathy. EG junction is irregular at 34cm w/ ulcerations extending to 32cm. Path showed chronic gastritis, negative H. Pylori; GE mucosa showed mild active chronic inflammation, suggestive of reflux disease; negative for intestinal metaplasia. Bx of esophageal ulcers at 32cm showed acute ulcerating to chronic inflammation, moderate to severe, w/ surface inflammatory exudate, no evidence of fungal organisms. Negative for HSV I/II and CMV.\par \par Manometry on 6/23/21 revealed no adequate peristalsis, 40% of swallows are supine and 60% are absent, Ineffective esophageal motility with 4.2 cm hiatal hernia. \par \par Barium esophagram on 7/8/21: showed moderate hiatal hernia with GE reflux. \par \par Now 2 yrs s/p EGD, laparoscopic, robotic-assisted, repair of hiatal hernia, Toupet fundoplication, repair of umbilical hernia on 7/12/21. Path revealed benign fibroconnective and adipose tissue, c/w hernia sac. \par \par CT chest was ordered by Dr. De Guzman (Hem/Onc) due to hx of lung cancer. \par \par CT chest on 11/03/2021:\par - Status post left upper lobectomy. \par - Emphysema is present. The central airways are patent. \par - An 8 mm left lower lobe nodule (3:105) is unchanged since 2016.\par - Small hiatal hernia.\par \par Barium esophagram on 5/5/22:\par - small sliding hiatal hernia \par \par Barium esophagram on 11/3/22:\par - there was delayed emptying into the stomach (10 minutes) at the gastroesophageal junction\par - sliding hiatal hernia\par \par CT Chest on 11/4/22:\par - post-op changes\par - stable 8 mm LLL\par \par 12/1/22: Discussed with pt's GI Dr. Vidales, most recent EGD showed a lot of acid and esophagitis and ulceration even though pt has no symptoms. I would like to take a look of myself, will schedule EGD on 12/22/22. \par \par EGD with biopsy on 1/19/23: \par - Z- line located at 34 cm from the incisor. The diaphragmatic pinch is at 39 cm\par - Recurrent 5 cm hiatal hernia. I entered the stomach\par - Hill's grade IV hiatal hernia seen. Multiple biopsies were performed at 34 cm. There was evidence of esophagitis seen that looked quite severe. Path - Esophagus at 34 cm: Squamous epithelium with scant focal parakeratosis. \par \par Now 3.5 mo s/p EGD, redo, Laparoscopy, R.A. extensive lysis of intraabdominal adhesion, repair of recurrent hiatal hernia, gastropexy, placement of Lt chest tube on 3/14/23.\par \par CXR today -- reviewed, no PTX.\par \par Patient is here today for a follow up. Patient has recovered well, no complaints, tolerating 4-5 small meals a day.\par

## 2023-07-05 ENCOUNTER — RX RENEWAL (OUTPATIENT)
Age: 78
End: 2023-07-05

## 2023-07-26 NOTE — ASU PATIENT PROFILE, ADULT - PMH
Refilling, please clarify with patient if he's taking both febuxostat and colchicine daily BPH (benign prostatic hypertrophy)    Crohn's disease    Gastritis    Glaucoma    Hiatal hernia    Hyperlipidemia    Hypertension    Malignant neoplasm of lung  Stg 1 T1aNO lung adeno ca 2/2019, denies chemo and radiation

## 2023-10-02 ENCOUNTER — APPOINTMENT (OUTPATIENT)
Dept: INTERNAL MEDICINE | Facility: CLINIC | Age: 78
End: 2023-10-02
Payer: MEDICARE

## 2023-10-02 VITALS
WEIGHT: 138 LBS | TEMPERATURE: 97.3 F | HEIGHT: 63 IN | OXYGEN SATURATION: 85 % | HEART RATE: 97 BPM | SYSTOLIC BLOOD PRESSURE: 140 MMHG | DIASTOLIC BLOOD PRESSURE: 80 MMHG | BODY MASS INDEX: 24.45 KG/M2

## 2023-10-02 DIAGNOSIS — I10 ESSENTIAL (PRIMARY) HYPERTENSION: ICD-10-CM

## 2023-10-02 PROCEDURE — 99214 OFFICE O/P EST MOD 30 MIN: CPT | Mod: 25

## 2023-10-02 PROCEDURE — 90662 IIV NO PRSV INCREASED AG IM: CPT

## 2023-10-02 PROCEDURE — G0008: CPT

## 2023-10-02 RX ORDER — SCOPOLAMINE 1.5 MG/1
1 PATCH, EXTENDED RELEASE TRANSDERMAL
Qty: 1 | Refills: 0 | Status: ACTIVE | COMMUNITY
Start: 2023-10-02 | End: 1900-01-01

## 2023-10-03 PROBLEM — I10 ESSENTIAL HYPERTENSION: Status: ACTIVE | Noted: 2019-01-17

## 2023-10-03 LAB
ALBUMIN SERPL ELPH-MCNC: 4.4 G/DL
ALP BLD-CCNC: 76 U/L
ALT SERPL-CCNC: 7 U/L
ANION GAP SERPL CALC-SCNC: 13 MMOL/L
APPEARANCE: CLEAR
AST SERPL-CCNC: 18 U/L
BACTERIA: NEGATIVE /HPF
BASOPHILS # BLD AUTO: 0.03 K/UL
BASOPHILS NFR BLD AUTO: 0.6 %
BILIRUB SERPL-MCNC: 0.5 MG/DL
BILIRUBIN URINE: NEGATIVE
BLOOD URINE: NEGATIVE
BUN SERPL-MCNC: 14 MG/DL
CALCIUM SERPL-MCNC: 9.1 MG/DL
CAST: 0 /LPF
CHLORIDE SERPL-SCNC: 102 MMOL/L
CO2 SERPL-SCNC: 24 MMOL/L
COLOR: YELLOW
CREAT SERPL-MCNC: 1.05 MG/DL
EGFR: 72 ML/MIN/1.73M2
EOSINOPHIL # BLD AUTO: 0.12 K/UL
EOSINOPHIL NFR BLD AUTO: 2.4 %
EPITHELIAL CELLS: 0 /HPF
GLUCOSE QUALITATIVE U: NEGATIVE MG/DL
GLUCOSE SERPL-MCNC: 100 MG/DL
HCT VFR BLD CALC: 43 %
HGB BLD-MCNC: 13.8 G/DL
IMM GRANULOCYTES NFR BLD AUTO: 0.2 %
KETONES URINE: NEGATIVE MG/DL
LEUKOCYTE ESTERASE URINE: NEGATIVE
LYMPHOCYTES # BLD AUTO: 0.73 K/UL
LYMPHOCYTES NFR BLD AUTO: 14.6 %
MAN DIFF?: NORMAL
MCHC RBC-ENTMCNC: 31.9 PG
MCHC RBC-ENTMCNC: 32.1 GM/DL
MCV RBC AUTO: 99.3 FL
MICROSCOPIC-UA: NORMAL
MONOCYTES # BLD AUTO: 0.51 K/UL
MONOCYTES NFR BLD AUTO: 10.2 %
NEUTROPHILS # BLD AUTO: 3.59 K/UL
NEUTROPHILS NFR BLD AUTO: 72 %
NITRITE URINE: NEGATIVE
PH URINE: 6.5
PLATELET # BLD AUTO: 259 K/UL
POTASSIUM SERPL-SCNC: 4.4 MMOL/L
PROT SERPL-MCNC: 6.9 G/DL
PROTEIN URINE: NEGATIVE MG/DL
RBC # BLD: 4.33 M/UL
RBC # FLD: 15.1 %
RED BLOOD CELLS URINE: 0 /HPF
SODIUM SERPL-SCNC: 139 MMOL/L
SPECIFIC GRAVITY URINE: 1
UROBILINOGEN URINE: 0.2 MG/DL
WBC # FLD AUTO: 4.99 K/UL
WHITE BLOOD CELLS URINE: 0 /HPF

## 2023-10-21 LAB — PSA SERPL-MCNC: 7.2 NG/ML

## 2023-10-23 ENCOUNTER — APPOINTMENT (OUTPATIENT)
Dept: UROLOGY | Facility: CLINIC | Age: 78
End: 2023-10-23
Payer: MEDICARE

## 2023-10-23 VITALS
TEMPERATURE: 97.4 F | SYSTOLIC BLOOD PRESSURE: 180 MMHG | RESPIRATION RATE: 16 BRPM | DIASTOLIC BLOOD PRESSURE: 90 MMHG | BODY MASS INDEX: 24.45 KG/M2 | HEIGHT: 63 IN | HEART RATE: 66 BPM | WEIGHT: 138 LBS | OXYGEN SATURATION: 95 %

## 2023-10-23 DIAGNOSIS — R82.89 OTHER ABNRM FNDNGS ON CYTOLOGICAL: ICD-10-CM

## 2023-10-23 PROCEDURE — 99214 OFFICE O/P EST MOD 30 MIN: CPT

## 2023-10-23 RX ORDER — FINASTERIDE 5 MG/1
5 TABLET, FILM COATED ORAL DAILY
Qty: 90 | Refills: 3 | Status: ACTIVE | COMMUNITY
Start: 2021-12-13 | End: 1900-01-01

## 2023-10-24 ENCOUNTER — APPOINTMENT (OUTPATIENT)
Dept: PULMONOLOGY | Facility: CLINIC | Age: 78
End: 2023-10-24
Payer: MEDICARE

## 2023-10-24 VITALS — HEART RATE: 63 BPM | DIASTOLIC BLOOD PRESSURE: 84 MMHG | OXYGEN SATURATION: 95 % | SYSTOLIC BLOOD PRESSURE: 154 MMHG

## 2023-10-24 DIAGNOSIS — C34.90 MALIGNANT NEOPLASM OF UNSPECIFIED PART OF UNSPECIFIED BRONCHUS OR LUNG: ICD-10-CM

## 2023-10-24 PROCEDURE — 94010 BREATHING CAPACITY TEST: CPT

## 2023-10-24 PROCEDURE — 99213 OFFICE O/P EST LOW 20 MIN: CPT | Mod: 25

## 2023-10-24 RX ORDER — ALBUTEROL SULFATE 90 UG/1
108 (90 BASE) INHALANT RESPIRATORY (INHALATION)
Qty: 1 | Refills: 3 | Status: ACTIVE | COMMUNITY
Start: 2019-09-27 | End: 1900-01-01

## 2023-10-25 PROBLEM — C34.90 LUNG CANCER: Status: ACTIVE | Noted: 2021-12-22

## 2023-11-04 RX ORDER — TADALAFIL 20 MG/1
20 TABLET ORAL
Qty: 30 | Refills: 11 | Status: ACTIVE | COMMUNITY
Start: 2022-03-22 | End: 1900-01-01

## 2023-11-05 ENCOUNTER — RX RENEWAL (OUTPATIENT)
Age: 78
End: 2023-11-05

## 2023-11-06 ENCOUNTER — APPOINTMENT (OUTPATIENT)
Dept: RADIOLOGY | Facility: HOSPITAL | Age: 78
End: 2023-11-06

## 2023-12-04 NOTE — DATA REVIEWED
[FreeTextEntry1] : I have independently reviewed the following:\par Barium esophagram on 11/3/22
Health Care Proxy (HCP)

## 2023-12-20 ENCOUNTER — APPOINTMENT (OUTPATIENT)
Dept: CT IMAGING | Facility: CLINIC | Age: 78
End: 2023-12-20
Payer: MEDICARE

## 2023-12-20 ENCOUNTER — OUTPATIENT (OUTPATIENT)
Dept: OUTPATIENT SERVICES | Facility: HOSPITAL | Age: 78
LOS: 1 days | End: 2023-12-20
Payer: COMMERCIAL

## 2023-12-20 DIAGNOSIS — Z98.890 OTHER SPECIFIED POSTPROCEDURAL STATES: Chronic | ICD-10-CM

## 2023-12-20 DIAGNOSIS — Z90.89 ACQUIRED ABSENCE OF OTHER ORGANS: Chronic | ICD-10-CM

## 2023-12-20 DIAGNOSIS — Z00.8 ENCOUNTER FOR OTHER GENERAL EXAMINATION: ICD-10-CM

## 2023-12-20 PROCEDURE — 71250 CT THORAX DX C-: CPT

## 2023-12-20 PROCEDURE — 71250 CT THORAX DX C-: CPT | Mod: 26

## 2023-12-21 ENCOUNTER — APPOINTMENT (OUTPATIENT)
Dept: THORACIC SURGERY | Facility: CLINIC | Age: 78
End: 2023-12-21

## 2023-12-28 ENCOUNTER — OUTPATIENT (OUTPATIENT)
Dept: OUTPATIENT SERVICES | Facility: HOSPITAL | Age: 78
LOS: 1 days | End: 2023-12-28
Payer: COMMERCIAL

## 2023-12-28 DIAGNOSIS — Z98.890 OTHER SPECIFIED POSTPROCEDURAL STATES: Chronic | ICD-10-CM

## 2023-12-28 DIAGNOSIS — C34.92 MALIGNANT NEOPLASM OF UNSPECIFIED PART OF LEFT BRONCHUS OR LUNG: ICD-10-CM

## 2023-12-28 DIAGNOSIS — K44.9 DIAPHRAGMATIC HERNIA WITHOUT OBSTRUCTION OR GANGRENE: ICD-10-CM

## 2023-12-28 DIAGNOSIS — Z90.89 ACQUIRED ABSENCE OF OTHER ORGANS: Chronic | ICD-10-CM

## 2023-12-28 PROCEDURE — 74220 X-RAY XM ESOPHAGUS 1CNTRST: CPT

## 2023-12-28 PROCEDURE — A9698: CPT

## 2023-12-28 PROCEDURE — 74220 X-RAY XM ESOPHAGUS 1CNTRST: CPT | Mod: 26

## 2024-02-08 ENCOUNTER — APPOINTMENT (OUTPATIENT)
Dept: THORACIC SURGERY | Facility: CLINIC | Age: 79
End: 2024-02-08
Payer: MEDICARE

## 2024-02-08 VITALS
SYSTOLIC BLOOD PRESSURE: 142 MMHG | HEART RATE: 72 BPM | HEIGHT: 63 IN | OXYGEN SATURATION: 97 % | WEIGHT: 138 LBS | RESPIRATION RATE: 17 BRPM | BODY MASS INDEX: 24.45 KG/M2 | DIASTOLIC BLOOD PRESSURE: 79 MMHG

## 2024-02-08 PROCEDURE — 99214 OFFICE O/P EST MOD 30 MIN: CPT

## 2024-02-08 NOTE — ASSESSMENT
[FreeTextEntry1] : Mr. DEUCE SIDHU, 78 year old male, former smoker, w/ hx of HTN, HLD, COPD, Crohn's disease, Stg I T1aN0 Lung AdenoCA s/p JULIO segmentectomy on 2/4/19 by Dr. Billings at [facility], who referred by Dr. Timmy Vidales (GI) for hiatal hernia with chronic ulcerative esophagitis.  Now 2.5 yrs s/p EGD, laparoscopic, robotic-assisted, repair of hiatal hernia, Toupet fundoplication, repair of umbilical hernia on 7/12/21. Path revealed benign fibroconnective and adipose tissue, c/w hernia sac.  Now 11 mo s/p EGD, redo, Laparoscopy, R.A. extensive lysis of intraabdominal adhesion, repair of recurrent hiatal hernia, gastropexy, placement of Lt chest tube on 3/14/23.  CT Chest on 12/20/23: - Left upper lobectomy. No endobronchial lesion. Saber-sheath configuration of the trachea suggestive of COPD. Unchanged emphysema with panlobular components. Few calcified granulomas. No pleural effusion. - Small hiatal hernia. No lymphadenopathy. - 3.8 cm ascending aorta.  Barium esophagram on 12/28/23: - a small outpouching of barium along the lateral aspect of the distal esophagus at the gastroesophageal junction, which may reflect a small diverticulum. - a smooth defect along the gastric fundus compatible with infradiaphragmatic fundoplication wrap. - a hiatal hernia is noted involving the proximal portion of the stomach. - No significant gastroesophageal reflux is noted   I have reviewed the patient's medical records and diagnostic images at time of this office consultation and have made the following recommendation: 1. CT scan of 12/20/23 & barium esophagram of 12/28/23 reviewed with pt today.  2. Doing well from hiatal hernia repair standpoint.  3. To continue f/u with his GI and Med Onc. 4. RTC prn.     I, ASHLEY Verdugo, personally performed the evaluation and management (E/M) services for this established patient who follow up today with an existing condition.  That E/M includes conducting the examination, assessing all new/exacerbated/existing conditions, and establishing a plan of care. Today, my ACP, Pari Pavon NP, was here to observe my evaluation and management services for this existing condition to be followed going forward.

## 2024-02-08 NOTE — HISTORY OF PRESENT ILLNESS
[FreeTextEntry1] : Mr. DEUCE SIDHU, 78 year old male, former smoker, w/ hx of HTN, HLD, COPD, Crohn's disease, Stg I T1aN0 Lung AdenoCA s/p JULIO segmentectomy on 2/4/19 by Dr. Billings at [facility], who referred by Dr. Timmy Vidales (GI) for hiatal hernia with chronic ulcerative esophagitis.  CT Chest on 8/11/2020 showed calcified lung nodules; a stable 6mm LLL nodule (3:109); s/p LULobectomy; unremarkable abdomen.  EGD on 08/19/2020. Path of EGJ bx revealed segment of inflammatory exudate. Cardia mucosa with mild to moderate chronic inflammation and vascular congestion. Path of esophagus bx revealed extensive ulcerative acute esophagitis with inflammatory exudate and regions of granulation tissue. Duodenal bx revealed moderate chronic active duodenitis.  EGD on 11/11/2020. Path of duodenal bulb bx revealed duodenal mucosa with Brunner's gland hyperplasia. Peptic duodenitis. GEJ bx revealed squamocolumnar mucosa with acute and chronic inflammation. Esophageus bx revealed ulcerative esophagitis involving squamous epithelium.  EGD on 3/24/21 by Dr. Timmy Vidales showed a 5 cm hiatal hernia; mild non-erosive gastritis; mild portal gastropathy. EG junction is irregular at 34cm w/ ulcerations extending to 32cm. Path showed chronic gastritis, negative H. Pylori; GE mucosa showed mild active chronic inflammation, suggestive of reflux disease; negative for intestinal metaplasia. Bx of esophageal ulcers at 32cm showed acute ulcerating to chronic inflammation, moderate to severe, w/ surface inflammatory exudate, no evidence of fungal organisms. Negative for HSV I/II and CMV.  Manometry on 6/23/21 revealed no adequate peristalsis, 40% of swallows are supine and 60% are absent, Ineffective esophageal motility with 4.2 cm hiatal hernia.  Barium esophagram on 7/8/21: showed moderate hiatal hernia with GE reflux.  Now 2.5 yrs s/p EGD, laparoscopic, robotic-assisted, repair of hiatal hernia, Toupet fundoplication, repair of umbilical hernia on 7/12/21. Path revealed benign fibroconnective and adipose tissue, c/w hernia sac.  CT chest was ordered by Dr. De Guzman (Hem/Onc) due to hx of lung cancer.  CT chest on 11/03/2021: - Status post left upper lobectomy. - Emphysema is present. The central airways are patent. - An 8 mm left lower lobe nodule (3:105) is unchanged since 2016. - Small hiatal hernia.  Barium esophagram on 5/5/22: - small sliding hiatal hernia  Barium esophagram on 11/3/22: - there was delayed emptying into the stomach (10 minutes) at the gastroesophageal junction - sliding hiatal hernia  CT Chest on 11/4/22: - post-op changes - stable 8 mm LLL  12/1/22: Discussed with pt's GI Dr. Vidales, most recent EGD showed a lot of acid and esophagitis and ulceration even though pt has no symptoms. I would like to take a look of myself, will schedule EGD on 12/22/22.  EGD with biopsy on 1/19/23: - Z- line located at 34 cm from the incisor. The diaphragmatic pinch is at 39 cm - Recurrent 5 cm hiatal hernia. I entered the stomach - Hill's grade IV hiatal hernia seen. Multiple biopsies were performed at 34 cm. There was evidence of esophagitis seen that looked quite severe. Path - Esophagus at 34 cm: Squamous epithelium with scant focal parakeratosis.  Now 11 mo s/p EGD, redo, Laparoscopy, R.A. extensive lysis of intraabdominal adhesion, repair of recurrent hiatal hernia, gastropexy, placement of Lt chest tube on 3/14/23.  CT Chest on 12/20/23: - Left upper lobectomy. No endobronchial lesion. Saber-sheath configuration of the trachea suggestive of COPD. Unchanged emphysema with panlobular components. Few calcified granulomas. No pleural effusion. - Small hiatal hernia. No lymphadenopathy. - 3.8 cm ascending aorta.  Barium esophagram on 12/28/23: - a small outpouching of barium along the lateral aspect of the distal esophagus at the gastroesophageal junction, which may reflect a small diverticulum. - a smooth defect along the gastric fundus compatible with infradiaphragmatic fundoplication wrap. - a hiatal hernia is noted involving the proximal portion of the stomach. - No significant gastroesophageal reflux is noted   Patient is here today for a follow up. The patient denies SOB, cough, chest pain, hemoptysis, N/V/CD, epi/abd pain, acid reflux.

## 2024-02-08 NOTE — PHYSICAL EXAM
[Sclera] : the sclera and conjunctiva were normal [PERRL With Normal Accommodation] : pupils were equal in size, round, and reactive to light [Neck Appearance] : the appearance of the neck was normal [Neck Cervical Mass (___cm)] : no neck mass was observed [] : no respiratory distress [Respiration, Rhythm And Depth] : normal respiratory rhythm and effort [Exaggerated Use Of Accessory Muscles For Inspiration] : no accessory muscle use [Auscultation Breath Sounds / Voice Sounds] : lungs were clear to auscultation bilaterally [Apical Impulse] : the apical impulse was normal [Heart Rate And Rhythm] : heart rate was normal and rhythm regular [Heart Sounds] : normal S1 and S2 [Examination Of The Chest] : the chest was normal in appearance [Chest Visual Inspection Thoracic Asymmetry] : no chest asymmetry [No Pulse Delay] : no pulse delay [Bowel Sounds] : normal bowel sounds [Abdomen Soft] : soft [Abdomen Tenderness] : non-tender [No CVA Tenderness] : no ~M costovertebral angle tenderness [Abnormal Walk] : normal gait [Involuntary Movements] : no involuntary movements were seen [Musculoskeletal - Swelling] : no joint swelling seen [No Focal Deficits] : no focal deficits [Oriented To Time, Place, And Person] : oriented to person, place, and time [Impaired Insight] : insight and judgment were intact [Affect] : the affect was normal [Mood] : the mood was normal

## 2024-02-08 NOTE — CONSULT LETTER
[FreeTextEntry2] : Dr. Timmy Vidales (GI/Referring)\par  Dr. Volodymyr Pearce (Pulm)\par  Dr. Dwayne Bob (PCP) \par  Dr. Jose De Guzman (Hem/Onc)  [FreeTextEntry3] : Mervin Silverman MD, MPH \par  System Director of Thoracic Surgery \par  Director of Comprehensive Lung and Foregut [place] \par  Professor Cardiovascular & Thoracic Surgery  \par  [facility] [employer] [facility]\par  \par  [facility]\par  [address]\par  Oncology [address]\[place] [address]\par  Tel: [phone]\par  Fax: [phone]\par

## 2024-02-08 NOTE — DATA REVIEWED
[FreeTextEntry1] : I have independently reviewed the following: CT Chest on 12/20/23 Barium esophagram on 12/28/23

## 2024-02-14 RX ORDER — TIOTROPIUM BROMIDE INHALATION SPRAY 3.12 UG/1
2.5 SPRAY, METERED RESPIRATORY (INHALATION) DAILY
Qty: 3 | Refills: 2 | Status: ACTIVE | COMMUNITY
Start: 2020-02-20 | End: 1900-01-01

## 2024-03-04 ENCOUNTER — APPOINTMENT (OUTPATIENT)
Dept: INTERNAL MEDICINE | Facility: CLINIC | Age: 79
End: 2024-03-04
Payer: MEDICARE

## 2024-03-04 VITALS
HEIGHT: 63 IN | DIASTOLIC BLOOD PRESSURE: 78 MMHG | TEMPERATURE: 98.3 F | OXYGEN SATURATION: 96 % | BODY MASS INDEX: 25.16 KG/M2 | HEART RATE: 58 BPM | SYSTOLIC BLOOD PRESSURE: 140 MMHG | WEIGHT: 142 LBS

## 2024-03-04 DIAGNOSIS — K44.9 DIAPHRAGMATIC HERNIA W/OUT OBSTRUCTION OR GANGRENE: ICD-10-CM

## 2024-03-04 DIAGNOSIS — C34.92 MALIGNANT NEOPLASM OF UNSPECIFIED PART OF LEFT BRONCHUS OR LUNG: ICD-10-CM

## 2024-03-04 DIAGNOSIS — K50.90 CROHN'S DISEASE, UNSPECIFIED, W/OUT COMPLICATIONS: ICD-10-CM

## 2024-03-04 DIAGNOSIS — N40.0 BENIGN PROSTATIC HYPERPLASIA WITHOUT LOWER URINARY TRACT SYMPMS: ICD-10-CM

## 2024-03-04 DIAGNOSIS — K21.9 DIAPHRAGMATIC HERNIA W/OUT OBSTRUCTION OR GANGRENE: ICD-10-CM

## 2024-03-04 PROCEDURE — 99214 OFFICE O/P EST MOD 30 MIN: CPT | Mod: 25

## 2024-03-04 PROCEDURE — 36415 COLL VENOUS BLD VENIPUNCTURE: CPT

## 2024-03-04 RX ORDER — AZITHROMYCIN 250 MG/1
250 TABLET, FILM COATED ORAL
Qty: 1 | Refills: 0 | Status: DISCONTINUED | COMMUNITY
Start: 2023-10-02 | End: 2024-03-04

## 2024-03-04 NOTE — HISTORY OF PRESENT ILLNESS
[Spouse] : spouse [de-identified] :  presents for f/u visit for review of chronic conditions, current rx, updates. he is feeling well overall. no new concerns.  gained some weight     continues PPI for chronic GERD , planning for repeat EGD w Dr Sobeida PATEL  HTN controlled on rx  monitoring elevated PSA, following w Dr Sanchez and he underwent prostate bx with benign findings 2022.   mild anemia resolved   continues f/u w Dr Sobeida PATEL for chronic GERD,  EGD showed esophageal ulceration. also had updated colonoscopy  COPD/emphysema stable, using inhalers only intermittently for maintenance, following w Dr Pearce , stopped smoking completely  s/p L lung adenocarcinoma resection w Dr Billings at [facility] without complications.  BPH symptoms relatively controlled Crohns disease stable and asymptomatic on Lialda following w Dr Timmy PATEL

## 2024-03-04 NOTE — REVIEW OF SYSTEMS
[Negative] : Heme/Lymph [Orthopena] : no orthopnea [Chest Pain] : no chest pain [Cough] : no cough [Shortness Of Breath] : no shortness of breath [Dyspnea on Exertion] : not dyspnea on exertion [Abdominal Pain] : no abdominal pain [Melena] : no melena [Vomiting] : no vomiting [Anxiety] : no anxiety [Dysuria] : no dysuria

## 2024-03-04 NOTE — ASSESSMENT
[FreeTextEntry1] : discussed w pt  reviewed updated hx, current rx  cont activities, monitor weight , improved  cont current rx   f/u as scheduled w specialists, reviewed updates   check routine labs as below   vaccinations reviewed, all UTD   RTO 6 months or earlier prn if any new concerns

## 2024-03-08 LAB
ALBUMIN SERPL ELPH-MCNC: 4.4 G/DL
ALP BLD-CCNC: 82 U/L
ALT SERPL-CCNC: 13 U/L
ANION GAP SERPL CALC-SCNC: 11 MMOL/L
APPEARANCE: CLEAR
AST SERPL-CCNC: 18 U/L
BACTERIA: NEGATIVE /HPF
BASOPHILS # BLD AUTO: 0.05 K/UL
BASOPHILS NFR BLD AUTO: 0.8 %
BILIRUB SERPL-MCNC: 0.4 MG/DL
BILIRUBIN URINE: NEGATIVE
BLOOD URINE: NEGATIVE
BUN SERPL-MCNC: 29 MG/DL
CALCIUM SERPL-MCNC: 9.5 MG/DL
CAST: 0 /LPF
CHLORIDE SERPL-SCNC: 103 MMOL/L
CHOLEST SERPL-MCNC: 217 MG/DL
CO2 SERPL-SCNC: 25 MMOL/L
COLOR: YELLOW
CREAT SERPL-MCNC: 1.39 MG/DL
EGFR: 51 ML/MIN/1.73M2
EOSINOPHIL # BLD AUTO: 0.28 K/UL
EOSINOPHIL NFR BLD AUTO: 4.6 %
EPITHELIAL CELLS: 0 /HPF
ESTIMATED AVERAGE GLUCOSE: 126 MG/DL
GLUCOSE QUALITATIVE U: NEGATIVE MG/DL
GLUCOSE SERPL-MCNC: 99 MG/DL
HBA1C MFR BLD HPLC: 6 %
HCT VFR BLD CALC: 45.5 %
HDLC SERPL-MCNC: 83 MG/DL
HGB BLD-MCNC: 14.3 G/DL
IMM GRANULOCYTES NFR BLD AUTO: 0.2 %
KETONES URINE: NEGATIVE MG/DL
LDLC SERPL CALC-MCNC: 121 MG/DL
LEUKOCYTE ESTERASE URINE: NEGATIVE
LYMPHOCYTES # BLD AUTO: 0.79 K/UL
LYMPHOCYTES NFR BLD AUTO: 12.9 %
MAN DIFF?: NORMAL
MCHC RBC-ENTMCNC: 31.4 GM/DL
MCHC RBC-ENTMCNC: 32 PG
MCV RBC AUTO: 101.8 FL
MICROSCOPIC-UA: NORMAL
MONOCYTES # BLD AUTO: 0.53 K/UL
MONOCYTES NFR BLD AUTO: 8.6 %
NEUTROPHILS # BLD AUTO: 4.47 K/UL
NEUTROPHILS NFR BLD AUTO: 72.9 %
NITRITE URINE: NEGATIVE
NONHDLC SERPL-MCNC: 134 MG/DL
PH URINE: 6
PLATELET # BLD AUTO: 234 K/UL
POTASSIUM SERPL-SCNC: 5.6 MMOL/L
PROT SERPL-MCNC: 7.1 G/DL
PROTEIN URINE: NEGATIVE MG/DL
PSA SERPL-MCNC: 7.43 NG/ML
RBC # BLD: 4.47 M/UL
RBC # FLD: 15.2 %
RED BLOOD CELLS URINE: 0 /HPF
SODIUM SERPL-SCNC: 140 MMOL/L
SPECIFIC GRAVITY URINE: 1.01
TRIGL SERPL-MCNC: 76 MG/DL
TSH SERPL-ACNC: 1.1 UIU/ML
UROBILINOGEN URINE: 0.2 MG/DL
WBC # FLD AUTO: 6.13 K/UL
WHITE BLOOD CELLS URINE: 0 /HPF

## 2024-04-08 ENCOUNTER — APPOINTMENT (OUTPATIENT)
Dept: UROLOGY | Facility: CLINIC | Age: 79
End: 2024-04-08
Payer: MEDICARE

## 2024-04-08 VITALS
BODY MASS INDEX: 25.69 KG/M2 | OXYGEN SATURATION: 91 % | DIASTOLIC BLOOD PRESSURE: 93 MMHG | HEART RATE: 75 BPM | HEIGHT: 63 IN | SYSTOLIC BLOOD PRESSURE: 176 MMHG | TEMPERATURE: 98 F | WEIGHT: 145 LBS

## 2024-04-08 DIAGNOSIS — R30.0 DYSURIA: ICD-10-CM

## 2024-04-08 DIAGNOSIS — R79.89 OTHER SPECIFIED ABNORMAL FINDINGS OF BLOOD CHEMISTRY: ICD-10-CM

## 2024-04-08 DIAGNOSIS — R68.82 DECREASED LIBIDO: ICD-10-CM

## 2024-04-08 DIAGNOSIS — R97.20 ELEVATED PROSTATE, SPECIFIC ANTIGEN [PSA]: ICD-10-CM

## 2024-04-08 DIAGNOSIS — N52.9 MALE ERECTILE DYSFUNCTION, UNSPECIFIED: ICD-10-CM

## 2024-04-08 DIAGNOSIS — L57.0 ACTINIC KERATOSIS: ICD-10-CM

## 2024-04-08 PROCEDURE — 99214 OFFICE O/P EST MOD 30 MIN: CPT

## 2024-04-09 LAB
APPEARANCE: CLEAR
BACTERIA: NEGATIVE /HPF
BILIRUBIN URINE: NEGATIVE
BLOOD URINE: NEGATIVE
CAST: 0 /LPF
COLOR: YELLOW
EPITHELIAL CELLS: 0 /HPF
GLUCOSE QUALITATIVE U: NEGATIVE MG/DL
KETONES URINE: NEGATIVE MG/DL
LEUKOCYTE ESTERASE URINE: NEGATIVE
MICROSCOPIC-UA: NORMAL
NITRITE URINE: NEGATIVE
PH URINE: 6.5
PROTEIN URINE: NEGATIVE MG/DL
RED BLOOD CELLS URINE: 0 /HPF
SPECIFIC GRAVITY URINE: 1.01
URINE CYTOLOGY: NORMAL
UROBILINOGEN URINE: 0.2 MG/DL
WHITE BLOOD CELLS URINE: 0 /HPF

## 2024-04-10 ENCOUNTER — RX RENEWAL (OUTPATIENT)
Age: 79
End: 2024-04-10

## 2024-04-11 LAB — BACTERIA UR CULT: NORMAL

## 2024-04-16 PROBLEM — R79.89 ELEVATED SERUM CREATININE: Status: ACTIVE | Noted: 2022-01-29

## 2024-04-16 PROBLEM — R79.89 LOW TESTOSTERONE IN MALE: Status: ACTIVE | Noted: 2021-12-22

## 2024-04-16 PROBLEM — R79.89 ELEVATED TESTOSTERONE LEVEL IN MALE: Status: ACTIVE | Noted: 2022-01-29

## 2024-04-16 PROBLEM — L57.0 ACTINIC KERATOSIS: Status: ACTIVE | Noted: 2018-09-14

## 2024-04-16 PROBLEM — R97.20 PSA ELEVATION: Status: ACTIVE | Noted: 2018-01-18

## 2024-04-16 PROBLEM — R30.0 DYSURIA: Status: ACTIVE | Noted: 2021-08-11

## 2024-04-16 PROBLEM — R97.20 ELEVATED PSA: Status: ACTIVE | Noted: 2018-07-31

## 2024-04-16 NOTE — ADDENDUM
[FreeTextEntry1] : This note was authored by Kelly Gallagher working as a scribe for Dr.Gary Sanchez. I, Dr. Cricket Sanchez have reviewed the content of this note and confirm it is true and accurate. I personally performed the history and physical examination and made all the decisions 04/08/2024

## 2024-04-16 NOTE — PHYSICAL EXAM
[Normal Appearance] : normal appearance [Well Groomed] : well groomed [General Appearance - In No Acute Distress] : no acute distress [Edema] : no peripheral edema [Respiration, Rhythm And Depth] : normal respiratory rhythm and effort [Exaggerated Use Of Accessory Muscles For Inspiration] : no accessory muscle use [Abdomen Soft] : soft [Abdomen Tenderness] : non-tender [Costovertebral Angle Tenderness] : no ~M costovertebral angle tenderness [Normal Station and Gait] : the gait and station were normal for the patient's age [] : no rash [No Focal Deficits] : no focal deficits [Oriented To Time, Place, And Person] : oriented to person, place, and time [Affect] : the affect was normal [Mood] : the mood was normal [de-identified] : The knee-chest position was utilized for the MERRICK. Digital rectal exam found no suspicious rectal masses. Normal seminal vesicles. Anal tone is normal. The prostate is non tender, with normal texture, discrete borders, and no nodules. It is a 50 gram transurethral resection size. No rectal mucosal lesions. No gross blood on the examining finger.

## 2024-04-16 NOTE — ASSESSMENT
[FreeTextEntry1] : Mr. Ramos is an 78 year old male presented with elevated PSA. PSA from 1/18/18 was 7.43 ng/mL and previously 8.11 ng/mL on 12/13/17. Patient had two prostate needle biopsies in the past and pathology on 3/9/10 showed benign prostatic tissue and focal mild chronic prostatitis. Patient has seen Dr. Manuel two years ago for erectile dysfunction and decreased libido. Had tried Trimix injections and oral medications for ED in the past but notes they do not improved his erections. Patient's wife has lung cancer and breast cancer. Has Crohn's disease but is not currently active. Current smoker.  8/16/18: The patient returned and reported he finished his Bactrim course. PSA from 8/13/18 was 11.77 ng/mL and a percent free PSA of 16.1%.  9/5/18: The patient returned to discuss MRI results. MRI of the prostate from 8/18/18 findings showed no suspicious lesions for clinically significant prostate cancer. If total PSA today is over 15 ng/mL and percent free PSA below 14%, we will proceed with biopsy.  11/21/18: The patient returned and reported his urination is adequate. Patient denied dysuria, gross hematuria, urgency, or incontinence. Wakes up 1-2 times during the night to urinate. Complained of ED, has tried viagra and Trimix injections in the past. Is willing to reconsider trying 7 tablets of Sildenafil. PSA from 11/12/18 was 9.50 ng/mL, current PSA is within range of patient, will watch PSA.  4/15/19: The patient returned today and reported that his urination is satisfactory. Patient denies dysuria, gross hematuria, pushing or straining to urinate, urgency, or incontinence. Wakes up once during the night to urinate. Patient hasn't tried the Sildenafil that I prescribed during his last visit. 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 Today he states that his urination is satisfactory. Patient denies dysuria, gross hematuria, urgency or incontinence. He is feeling very good overall. He has a Hx of elevated PSA and his most recent PSA from 9/17/19 was 8.77 ng/mL. An MRI was completed last year and determined PIRADS 2. A biopsy was not necessary at that time.  03/11/2020: Patient presents today for a follow up. His PSA as of 3/9/2020 was 10.10 ng/mL which has elevated from prior PSA of 8.77 from 9/17/2019. His creatinine was 1.21 mg/dL. The patient denies chest pains and constipation.  02/17/2021: Patient presents today for follow up. Feels well. Wakes 0-1x at night to urinate. Urinates 2-3x during the day. Denies urinary urgency, pushing or straining, gross hematuria, or dysuria. No constipation. No hx or FHx of kidney stones. No FHx of prostate CA. 7/16/20 MRI prostate revealed score of PIRADS 2. Reviewed recent 2/10/21 lab results. PSA 12.20. H/o Crohn's disease, not on any steroids. PSHx lung wedge resection. Former smoker of one pack a day for more than 30 years, and quit 4 years ago. Wife had lung and breast CA and is now in remission. Feels well today and offers no new complaints.  09/23/2021: Patient presents today for a follow up visit. Had lab work done on 9/17/2021 in anticipation for today's appointment. Patient's PSA was 15.3 which is further elevated than his previous elevated PSA's. Last PSA before this one was 12.2 on 2/10/2021. Pt had an MRI of the prostate in 2020 which demonstrated a PIRADS-2. His PSA has been stable but rising slowly. All other lab work is normal. Patient received the 3rd Pfizer covid vaccine booster on 9/10/2021.  10/13/2021: The patient gave permission for a telehealth visit. Patient is being followed for elevated PSA. Reviewed 10/12/21 urine results which were normal. Most recent PSA 10/8/21 of 21.6 has risen further from 15.3 on 9/17/21, which had increased from PSA on 2/10/21 was 12.2. Free PSA is 11%. Reviewed 7/16/20 prostate MRI which showed PIRADS 2. Urination is good. No pain or discomfort when he urinates or sits. Sexual function and erections are normal. No FHx of prostate cancer. Hx of lung cancer and is doing well.  11/01/2021: Patient presents today for follow up. On 7/16/20, patient had prostate MRI showing 88 cc gland and PSA density of 0.1 ng/mL, PIRADS 2, and no MRI suspicious lesion. More recently on 10/20/21 prostate MRI, there is a new lesion, 80 cc gland, and increased PSA density to 0.3 ng/mL from the previous exam, PIRADS 3. PSA 10/8/21 was 21.6 with %free of 11%, where previously on 3/9/20, PSA was 10.10 and %free. Hx of left lung nodule followed by Dr. Pearce. No issues with urination currently.  12/22/2021: Patient presents today for follow up. Accompanied by wife. Went for prostate biopsy on 12/8/21 with Dr. Teofilo Mojica and here to review results. Reviewed 12/8/21 prostate biopsy report showing all benign prostate tissue. Usually does not wake to urinate. Denies urinary urgency, straining to urinate, or dysuria. Stream is good. Never saw blood in urine before the biopsy. Denies blood in stool. Continues Finasteride 5mg once daily. Sexual desire is poor. On 9/17/21, total testosterone normal 341 and free testosterone low 3.5. Vitality is okay. No FHx of prostate cancer. Former smoker, quit in 2019 before he had lung surgery for hx of lung cancer. Has not had recurrence of lung cancer.  02/03/2022: Patient presents today for a follow up visit. The patient is feeling well today, but not significantly improved. He was accompanied by his wife who reports is mood has been the same. He began taking Clomiphene 50 mg, one tablet every other day on 12/22/21, but stopped taking it on 1/25/22 because he did not refill it. On 1/27/22, his free testosterone was 12.8, total testosterone was elevated at 790, PSA was 9.93 ng/mL, which was within his normal limits. Went for prostate biopsy on 12/8/21 with Dr. Teofilo Mojica and here to review results. Reviewed 12/8/21 prostate biopsy report showing all benign prostate tissue.  03/22/2022: The patient gave permission for a telehealth visit. Calls to review lab work. Currently on clomiphene 50mg every other day. Feels better than before starting medication. Mood and energy are good. Sexual desire however has not changed. Erections are poor. Prostate biopsy 12/2021 found only benign prostate tissue. Reviewed 3/16/22 lab work which showed PSA 8.73 neg biopsy 12/2021, Hgb slightly low 12.9 but higher than previously, MCHC low 30, free testosterone normal 8.8, total testosterone normal 528, creatinine 1.31 which is improved and has fluctuated between 0.99 to 1.49 in the past, LH elevated 10.5, progesterone elevated 0.3, prolactin 8.2, FSH elevated 12.7, estradiol 33, DHEAS normal. Urination is good. Feels urination is better than before clomiphene. Nocturia x0-1 which is better than before. Continues finasteride 5mg once daily.  Reviewed 3/16/22 lab work with patient in detail. Elevation in LH, progesterone, FSH expected on clomiphene. PSA coming down despite higher testosterone which is good indicating inflammation is resolving. Creatinine is improved. Continue clomiphene 50mg once every other day and finasteride 5mg one daily. I prescribed the patient tadalafil 20mg PRN 2-3 hours before sexual activity for ED. I informed the patient that they may experience tingling of the skin, headache, warm flushed feeling, heartburn, backache, and on rare occasion, visual or hearing disturbances. Informed the patient how to use [employer] to  tadalafil at a [employer] or [employer] for a discounted price. Repeat blood work in 2 months and schedule telehealth appointment to review results.  10/23/2023: Mr. DEUCE RAMOS presents today for a follow up. He is accompanied by his wife. He recently had lab work done on 10/2/2023. PSA was 7.20 which is within his usual range. Creatinine was 1.05. UA was normal. Also had lab work done with  on 8/2/2023. Urination has been quite good. Nocturia x0-1. Denies urinary urgency, frequency, pushing, straining, burning, and gross hematuria. Stream projects forward. Pt is occasionally sexually active. Requests a refill on tadalafil 20 mg PRN. Continues to take finasteride 5 mg once daily.   The knee-chest position was utilized for the MERRICK. Digital rectal exam found no suspicious rectal masses. Normal seminal vesicles. Anal tone is normal. The prostate is non tender, with normal texture, discrete borders, and no nodules. It is a 40 gram transurethral resection size. No rectal mucosal lesions. No gross blood on the examining finger.   Reviewed and discussed lab work of 10/2/2023 and 8/2/2023 in detail with the pt and his wife.  Patient was unable to provide a urine specimen in office today, will drop one off at his nearest [place] lab.  Renewed tadalafil 20 mg PRN and finasteride 5 mg once daily.  Patient will RTO in 6 months for reassessment, sooner if clinically indicated.    04/08/2024: Mr. DEUCE RAOMS presents today for a follow up. Patient had lab work done on 3/4/2024 ordered by . PSA was 7.43 which is within patient's usual range. Patient currently denies urinary urgency, frequency, pushing, straining, burning, and gross hematuria. Nocturia x0-1. Continues to take finasteride 5 mg once daily. Sexual desire is good. Remains active. Memory is still good. Hx of lung cancer.   The knee-chest position was utilized for the MERRICK. Digital rectal exam found no suspicious rectal masses. Normal seminal vesicles. Anal tone is normal. The prostate is non tender, with normal texture, discrete borders, and no nodules. It is a 50 gram transurethral resection size. No rectal mucosal lesions. No gross blood on the examining finger.    Reviewed and discussed lab work of 3/4/2024 in detail with the pt, ordered by Dr.Norman Bob.   Renewed finasteride 5 mg once daily and tadalafil 20 mg PRN.   Patient will RTO in 1 year for an in person visit and have a telehealth visit in 6 months for reassessment, sooner if clinically indicated.     Preparation, in person office visit, and coordination of care took 30 minutes.

## 2024-04-16 NOTE — HISTORY OF PRESENT ILLNESS
[FreeTextEntry1] : Mr. Ramos is an 78 year old male presented with elevated PSA. PSA from 1/18/18 was 7.43 ng/mL and previously 8.11 ng/mL on 12/13/17. Patient had two prostate needle biopsies in the past and pathology on 3/9/10 showed benign prostatic tissue and focal mild chronic prostatitis. Patient has seen Dr. Manuel two years ago for erectile dysfunction and decreased libido.  Had tried Trimix injections and oral medications for ED in the past but notes they do not improved his erections. Patient's wife has lung cancer and breast cancer. Has Crohn's disease but is not currently active. Current smoker.  8/16/18: The patient returned and reported he finished his Bactrim course. PSA from 8/13/18 was 11.77 ng/mL and a percent free PSA of 16.1%.  9/5/18: The patient returned to discuss MRI results. MRI of the prostate from 8/18/18 findings showed no suspicious lesions for clinically significant prostate cancer. If total PSA today is over 15 ng/mL and percent free PSA below 14%, we will proceed with biopsy.  11/21/18: The patient returned and reported his urination is adequate. Patient denied dysuria, gross hematuria, urgency, or incontinence. Wakes up 1-2 times during the night to urinate. Complained of ED, has tried viagra and Trimix injections in the past. Is willing to reconsider trying 7 tablets of Sildenafil. PSA from 11/12/18 was 9.50 ng/mL, current PSA is within range of patient, will watch PSA.  4/15/19: The patient returned today and reported that his urination is satisfactory. Patient denies dysuria, gross hematuria, pushing or straining to urinate, urgency, or incontinence. Wakes up once during the night to urinate. Patient hasn't tried the Sildenafil that I prescribed during his last visit. Notes that a nodule was recently found on his lung.It has been excised and was malignant, 9/25/19: Patient presents today for a follow up. Today he states that his urination is satisfactory. Patient denies dysuria, gross hematuria, urgency or incontinence. He is feeling very good overall. He has a Hx of elevated PSA and his most recent PSA from 9/17/19 was 8.77 ng/mL. An MRI was completed last year and determined PIRADS 2. A biopsy was not necessary at that time.   03/11/2020: Patient presents today for a follow up. His PSA as of 3/9/2020 was 10.10 ng/mL which has elevated from prior PSA of 8.77 from 9/17/2019. His creatinine was 1.21mg/dL. The patient denies chest pains and constipation.  02/17/2021: Patient presents today for follow up. Feels well. Wakes 0-1x at night to urinate. Urinates 2-3x during the day. Denies urinary urgency, pushing or straining, gross hematuria, or dysuria. No constipation. No hx or FHx of kidney stones. No FHx of prostate CA. 7/16/20 MRI prostate revealed score of PIRADS 2. Reviewed recent 2/10/21 lab results. PSA 12.20. H/o Crohn's disease, not on any steroids. PSHx lung wedge resection. Former smoker of one pack a day for more than 30 years, and quit 4 years ago. Wife had lung and breast CA and is now in remission. Feels well today and offers no new complaints.   09/23/2021: Patient presents today for a follow up visit. Had lab work done on 9/17/2021 in anticipation for today's appointment. Patient's PSA was 15.3 which is further elevated than his previous elevated PSA's. Last PSA before this one was 12.2 on 2/10/2021. Pt had an MRI of the prostate in 2020 which demonstrated a PIRADS-2. His PSA has been stable but rising slowly. All other lab work is normal. Patient received the 3rd Pfizer covid vaccine booster on 9/10/2021.   10/13/2021: The patient gave permission for a telehealth visit. Patient is being followed for elevated PSA. Reviewed 10/12/21 urine results which were normal. Most recent PSA 10/8/21 of 21.6 has risen further from 15.3 on 9/17/21, which had increased from PSA on 2/10/21 was 12.2. Free PSA is 11%. Reviewed 7/16/20 prostate MRI which showed PIRADS 2. Urination is good. No pain or discomfort when he urinates or sits. Sexual function and erections are normal. No FHx of prostate cancer. Hx of lung cancer and is doing well.   11/01/2021: Patient presents today for follow up. On 7/16/20, patient had prostate MRI showing 88 cc gland and PSA density of 0.1 ng/mL, PIRADS 2, and no MRI suspicious lesion. More recently on 10/20/21 prostate MRI, there is a new lesion, 80 cc gland, and increased PSA density to 0.3 ng/mL from the previous exam, PIRADS 3. PSA 10/8/21 was 21.6 with %free of 11%, where previously on 3/9/20, PSA was 10.10 and %free. Hx of left lung nodule followed by Dr. Pearce. No issues with urination currently.   12/22/2021: Patient presents today for follow up. Accompanied by wife. Went for prostate biopsy on 12/8/21 with Dr. Teofilo Mojica and here to review results. Reviewed 12/8/21 prostate biopsy report showing all benign prostate tissue. Usually does not wake to urinate. Denies urinary urgency, straining to urinate, or dysuria. Stream is good. Never saw blood in urine before the biopsy. Denies blood in stool. Continues Finasteride 5mg once daily. Sexual desire is poor. On 9/17/21, total testosterone normal 341 and free testosterone low 3.5. Vitality is okay. No FHx of prostate cancer. Former smoker, quit in 2019 before he had lung surgery for hx of lung cancer. Has not had recurrence of lung cancer.   02/03/2022: Patient presents today for a follow up visit. The patient is feeling well today, but not significantly improved. He was accompanied by his wife who reports is mood has been the same. He began taking Clomiphene 50 mg, one tablet every other day on 12/22/21, but stopped taking it on 1/25/22 because he did not refill it. On 1/27/22, his free testosterone was 12.8, total testosterone was elevated at 790, PSA was 9.93 ng/mL, which was within his normal limits. Went for prostate biopsy on 12/8/21 with Dr. Teofilo Mojica and here to review results. Reviewed 12/8/21 prostate biopsy report showing all benign prostate tissue.   03/22/2022: The patient gave permission for a telehealth visit. Calls to review lab work. Currently on clomiphene 50mg every other day. Feels better than before starting medication. Mood and energy are good. Sexual desire however has not changed. Erections are poor. Prostate biopsy 12/2021 found only benign prostate tissue. Reviewed 3/16/22 lab work which showed PSA 8.73 neg biopsy 12/2021, Hgb slightly low 12.9 but higher than previously, MCHC low 30, free testosterone normal 8.8, total testosterone normal 528, creatinine 1.31 which is improved and has fluctuated between 0.99 to 1.49 in the past, LH elevated 10.5, progesterone elevated 0.3, prolactin 8.2, FSH elevated 12.7, estradiol 33, DHEAS normal. Urination is good. Feels urination is better than before clomiphene. Nocturia x0-1 which is better than before. Continues finasteride 5mg once daily.   10/23/2023: Mr. DEUCE RAMOS presents today for a follow up. He is accompanied by his wife. He recently had lab work done on 10/2/2023. PSA was 7.20 which is within his usual range. Creatinine was 1.05. UA was normal. Also had lab work done with  on 8/2/2023. Urination has been quite good. Nocturia x0-1. Denies urinary urgency, frequency, pushing, straining, burning, and gross hematuria. Stream projects forward. Pt is occasionally sexually active. Requests a refill on tadalafil 20 mg PRN. Continues to take finasteride 5 mg once daily.   04/08/2024: Mr. DEUCE RAMOS presents today for a follow up. Patient had lab work done on 3/4/2024 ordered by . PSA was 7.43 which is within patient's usual range. Patient currently denies urinary urgency, frequency, pushing, straining, burning, and gross hematuria. Nocturia x0-1. Continues to take finasteride 5 mg once daily. Sexual desire is good. Remains active. Memory is still good. Hx of lung cancer.

## 2024-05-07 ENCOUNTER — APPOINTMENT (OUTPATIENT)
Dept: PULMONOLOGY | Facility: CLINIC | Age: 79
End: 2024-05-07
Payer: MEDICARE

## 2024-05-07 VITALS — SYSTOLIC BLOOD PRESSURE: 159 MMHG | HEART RATE: 65 BPM | OXYGEN SATURATION: 93 % | DIASTOLIC BLOOD PRESSURE: 77 MMHG

## 2024-05-07 DIAGNOSIS — J44.9 CHRONIC OBSTRUCTIVE PULMONARY DISEASE, UNSPECIFIED: ICD-10-CM

## 2024-05-07 PROCEDURE — 94010 BREATHING CAPACITY TEST: CPT

## 2024-05-07 PROCEDURE — 99213 OFFICE O/P EST LOW 20 MIN: CPT | Mod: 25

## 2024-05-08 PROBLEM — J44.9 COPD (CHRONIC OBSTRUCTIVE PULMONARY DISEASE): Status: ACTIVE | Noted: 2019-09-27

## 2024-05-08 NOTE — HISTORY OF PRESENT ILLNESS
[Former] : former [Never] : never [TextBox_4] : Follow-up for COPD history of lung cancer no new complaints

## 2024-07-28 ENCOUNTER — NON-APPOINTMENT (OUTPATIENT)
Age: 79
End: 2024-07-28

## 2024-07-29 ENCOUNTER — APPOINTMENT (OUTPATIENT)
Dept: DERMATOLOGY | Facility: CLINIC | Age: 79
End: 2024-07-29
Payer: MEDICARE

## 2024-07-29 VITALS — WEIGHT: 134 LBS | BODY MASS INDEX: 23.74 KG/M2 | HEIGHT: 63 IN

## 2024-07-29 DIAGNOSIS — L82.1 OTHER SEBORRHEIC KERATOSIS: ICD-10-CM

## 2024-07-29 DIAGNOSIS — Z12.83 ENCOUNTER FOR SCREENING FOR MALIGNANT NEOPLASM OF SKIN: ICD-10-CM

## 2024-07-29 DIAGNOSIS — D22.9 MELANOCYTIC NEVI, UNSPECIFIED: ICD-10-CM

## 2024-07-29 PROCEDURE — 99213 OFFICE O/P EST LOW 20 MIN: CPT

## 2024-07-29 NOTE — HISTORY OF PRESENT ILLNESS
[FreeTextEntry1] : growths on the skin [de-identified] : 78M with no personal hx of skin cancer here for growths on the skin

## 2024-07-29 NOTE — PHYSICAL EXAM
[Alert] : alert [Oriented x 3] : ~L oriented x 3 [Well Nourished] : well nourished [Conjunctiva Non-injected] : conjunctiva non-injected [No Visual Lymphadenopathy] : no visual  lymphadenopathy [No Clubbing] : no clubbing [No Edema] : no edema [No Bromhidrosis] : no bromhidrosis [No Chromhidrosis] : no chromhidrosis [FreeTextEntry3] : The patient is well-appearing, in no acute distress, alert and oriented x 3. Mood and affect are normal. A complete cutaneous examination of the scalp, face, neck, chest, abdomen, back, bilateral arms, bilateral legs, buttocks, digits, nails, eyelids, conjunctiva and lips reveals the following significant findings: -Tan to dark brown macules on the trunk and extremities with no concerning dermoscopic features  -Tan to dark brown stuck-on plaques on the trunk and extremities

## 2024-09-14 NOTE — REASON FOR VISIT
Patient ID: [id number]     Chief Complaint: Medicare AWV        HPI:     Swapna Hayes is a 63 y.o. female here today for a Medicare Wellness. No other complaints today.  Is continuing to make lifestyle modifications.   1) Is concerned about weight gain-would like to discuss guidelines regarding nutrition with dietitian.  Continuing to exercise regularly.    Cervical Cancer Screening -  Pap guidelines discussed with the patient  Breast Cancer Screening - Mammogram ordered  Colon Cancer Screening - Colonoscopy Up to date   Osteoporosis Screening - DEXA Up to date   Eye Exam - Recommend annually.  Dental Exam - Recommend biannually  Vaccinations -   Immunization History   Administered Date(s) Administered    COVID-19 MRNA, LN-S PF (MODERNA HALF 0.25 ML DOSE) 2022    COVID-19, MRNA, LN-S, PF (MODERNA FULL 0.5 ML DOSE) 2021, 2021    Td (ADULT) 2018    Tdap 2022    Zoster Recombinant 2022, 2022        Past Medical History:   Diagnosis Date    Mixed hyperlipidemia 7/15/2023    Primary osteoarthritis involving multiple joints 7/15/2023        Past Surgical History:   Procedure Laterality Date     SECTION      Closed treatment of trimalleolar ankle fracture; with manipulation (2018)      COLONOSCOPY  2014    KNEE SURGERY Bilateral 1993    Repair Right Hand Subcutaneous Tissue and Fascia, Open Approach (2018)      Reposition Right Fibula, External Approach (2018)      Reposition Right Tibia, External Approach (2018)      TOTAL KNEE ARTHROPLASTY Left 2022        Social History     Tobacco Use    Smoking status: Never    Smokeless tobacco: Never   Substance Use Topics    Alcohol use: Yes     Comment: occasional    Drug use: Never        Social History     Socioeconomic History    Marital status:      Spouse name: Jose    Number of children: 2        Current Outpatient Medications   Medication Instructions    multivitamin  (THERAGRAN) per tablet 1 tablet, Oral, Daily       Review of patient's allergies indicates:  No Known Allergies     Patient Care Team:  Ivette Cordero MD as PCP - General (Family Medicine)  Randolph Dykes MD as Consulting Physician (Orthopedic Surgery)     Subjective:     Review of Systems    12 point review of systems conducted, negative except as stated in the history of present illness. See HPI for details.    Patient Reported Health Risk Assessments:  What [age] 65-69  Are you male or female?: Female  During the past four weeks, how much have you been bothered by emotional problems such as feeling anxious, depressed, irritable, sad, or downhearted and blue?: Not at all  During the past five weeks, has your physical and/or emotional health limited your social activities with family, friends, neighbors, or groups?: Not at all  During the past four weeks, how much bodily pain have you generally had?: No pain  During the past four weeks, was someone available to help if you needed and wanted help?: Yes, as much as I wanted  During the past four weeks, what was the hardest physical activity you could do for at least two minutes?: Moderate  Can you get to places out of walking distance without help?  (For example, can you travel alone on buses or taxis, or drive your own car?): Yes  Can you go shopping for groceries or clothes without someone's help?: Yes  Can you prepare your own meals?: Yes  Can you do your own housework without help?: Yes  Because of any health problems, do you need the help of another person with your personal care needs such as eating, bathing, dressing, or getting around the house?: No  Can you handle your own money without help?: Yes  During the past four weeks, how would you rate your health in general?: Very good  How have things been going for you during the past four weeks?: Very well  Are you having difficulties driving your car?: No  Do you always fasten your seat belt when you  "are in a car?: Yes, usually  How often in the past four weeks have you been bothered by falling or dizzy when standing up?: Never  How often in the past four weeks have you been bothered by sexual problems?: Never  How often in the past four weeks have you been bothered by trouble eating well?: Never  How often in the past four weeks have you been bothered by teeth or denture problems?: Never  How often in the past four weeks have you been bothered with problems using the telephone?: Never  How often in the past four weeks have you been bothered by tiredness or fatigue?: Never  Have you fallen two or more times in the past year?: No  Are you afraid of falling?: No  Are you a smoker?: No  During the past four weeks, how many drinks of wine, beer, or other alcoholic beverages did you have?: One drink or less per week  Do you exercise for about 20 minutes three or more days a week?: Yes, most of the time  Have you been given any information to help you with hazards in your house that might hurt you?: Yes  Have you been given any information to help you with keeping track of your medications?: Yes  How often do you have trouble taking medicines the way you've been told to take them?: I always take them as prescribed  How confident are you that you can control and manage most of your health problems?: Very confident  What is your race? (Check all that apply.):     Objective:     Visit Vitals  /71 (BP Location: Left arm, Patient Position: Sitting, BP Method: Large (Automatic))   Pulse 88   Resp 16   Ht 5' 4" (1.626 m)   Wt 71.2 kg (157 lb)   SpO2 97%   BMI 26.95 kg/m²       Physical Exam    General: Alert and oriented, No acute distress.  Head: Normocephalic, Atraumatic.  Eye: Pupils are equal, round and reactive to light, Extraocular movements are intact, Sclera non-icteric.  Ears/Nose/Throat: Normal, Mucosa moist,Clear.  Neck/Thyroid: Supple, Non-tender, No carotid bruit, No palpable thyromegaly or " thyroid nodule, No lymphadenopathy, No JVD, Full range of motion.  Respiratory: Clear to auscultation bilaterally; No wheezes, rales or rhonchi, Non-labored respirations, Symmetrical chest wall expansion.  Cardiovascular: Regular rate and rhythm, S1/S2 normal, No murmurs, rubs or gallops.  Gastrointestinal: Soft, Non-tender, Non-distended, Normal bowel sounds, No palpable organomegaly.  Musculoskeletal: Normal range of motion.  Integumentary: Warm, Dry, Intact, No suspicious lesions or rashes.  Extremities: No clubbing, cyanosis or edema  Neurologic: No focal deficits, Cranial Nerves II-XII are grossly intact, Motor strength normal upper and lower extremities, Sensory exam intact.  Psychiatric: Normal interaction, Coherent speech, Euthymic mood, Appropriate affect       Assessment:       ICD-10-CM ICD-9-CM   1. Wellness examination  Z00.00 V70.0   2. Mixed hyperlipidemia  E78.2 272.2   3. Primary osteoarthritis involving multiple joints  M15.9 715.98   4. Blood glucose elevated  R73.9 790.29   5. Encounter for screening mammogram for breast cancer  Z12.31 V76.12        Plan:     1. Wellness examination  Patient presented to office today for their Medicare Annual Wellness Visit.    Education was provided on health nutrition, including a diet claudy in fruits and vegetables, minimizing simple carbohydrates, salt, and saturated fats.  Encouraged regular cardiovascular exercise such as walking at lease 30 minutes daily, 5 times per week.  Emphasized preventive health measures and educated pt on fall prevention and community-based lifestule interventions to help reduce health risks and promote healthy living.   - Ambulatory referral/consult to Gastroenterology; Future    2. Mixed hyperlipidemia  Lab Results   Component Value Date    CHOL 259 (H) 09/07/2023    TRIG 104 09/07/2023    HDL 73 09/07/2023     Pathophysiology/treatment/dangers discussed. Patient to continue diet modification-recheck labs management based on  findings.  [employer] guidelines discussed  Total cholesterol was 259 ( Goal less than 200) HDL 73 ( Goal high as possible)  ( Goal less than 100) Triglycerides 104 ( Goal less than 150)  The 10-year ASCVD risk score (Gil RHODES, et al., 2019) is: 4.9%    Values used to calculate the score:      Age: 63 years      Sex: Female      Is Non- : No      Diabetic: No      Tobacco smoker: No      Systolic Blood Pressure: 119 mmHg      Is BP treated: No      HDL Cholesterol: 73 mg/dL      Total Cholesterol: 259 mg/dL   - CBC Auto Differential; Future  - Comprehensive Metabolic Panel; Future  - Lipid Panel; Future  - TSH; Future  - Ambulatory referral/consult to Nutrition Services; Future    3. Primary osteoarthritis involving multiple joints  Patient is currently stable.  No acute modifications recommended.  Continue with lifestyle modifications.    4. Blood glucose elevated  Hemoglobin A1c  .  Lab Results   Component Value Date    HGBA1C 5.3 09/16/2024     Normal less than 5.7 - Diagnosis of Type 2 Diabetes Mellitus at 6.5. Encourage diet and activity modification- Pathophysiology/treatment/dangers discussed.    - Hemoglobin A1C; Future  - Urinalysis, Reflex to Urine Culture; Future    5. Encounter for screening mammogram for breast cancer  Check studies management based upon results.  Pathophysiology/treatment/dangers discussed.   - Mammo Digital Screening Bilat; Future    Fall Risk + Home Safety + Living Situation + Whisper Test + Depression Screen + CAGE + Cognitive Impairment Screen + ADL Screen + Timed Get Up and Go + Nutrition Screen + PAQ Screen + Health Risk Assessment all reviewed.          No data to display                  9/17/2024    11:00 AM 9/13/2023    12:30 PM   Fall Risk Assessment - Outpatient   Mobility Status Ambulatory Ambulatory   Number of falls 0 0   Identified as fall risk False False                   Depression Screening  Over the past two  weeks, has the patient felt down, depressed, or hopeless?: No  Over the past two weeks, has the patient felt little interest or pleasure in doing things?: No  Functional Ability/Safety Screening  Was the patient's timed Up & Go test unsteady or longer than 30 seconds?: No  Does the patient need help with phone, transportation, shopping, preparing meals, housework, laundry, meds, or managing money?: No  Does the patient's home have rugs in the hallway, lack grab bars in the bathroom, lack handrails on the stairs or have poor lighting?: No  Have you noticed any hearing difficulties?: No  Cognitive Function (Assessed through direct observation with due consideration of information obtained by way of patient reports and/or concerns raised by family, friends, caretakers, or others)    Does the patient repeat questions/statements in the same day?: No  Does the patient have trouble remembering the date, year, and time?: No  Does the patient have difficulty managing finances?: No  Does the patient have a decreased sense of direction?: No      Offer of free  was accepted or rejected?: rejected  If  rejected, why?: Patient states understands English    CVD Risk Factors - Reviewed  Obesity/Physical Activity - Normal BMI. Encouraged daily 30 minute physical activity x 5 days per week.    Opioid Screening: Patient medication list reviewed, patient is not taking prescription opioids. Patient is not using additional opioids than prescribed. Patient is at low risk of substance abuse based on this opioid use history.     Advance Care Planning     Date: 09/17/2024    Living Will  Advanced Care Planning: I offered to discuss Advanced Care Planning, which consists of how you would like to be cared for as you end the near of your natural life.  This includes how to pick a person who would make decisions for you if you were unable to make them for yourself, which is called a Medical Power of .  These discussions include what kind of decisions you will make regarding life sustaining measures, such as ventilators and feeding tubes, when faced with a life limiting illness recorded on a living will that they will need to know.                 The patient's Health Maintenance was reviewed and the following appears to be due at this time:   Health Maintenance Due   Topic Date Due    RSV Vaccine (Age 60+ and Pregnant patients) (1 - 1-dose 60+ series) Never done    Pneumococcal Vaccines (Age 65+) (1 of 1 - PCV) Never done    Colorectal Cancer Screening  07/16/2024    Mammogram  08/09/2024    Influenza Vaccine (1) Never done    COVID-19 Vaccine (4 - 2023-24 season) 09/01/2024         Follow up in about 1 year (around 9/17/2025) for Medicare Wellness. In addition to their scheduled follow up, the patient has also been instructed to follow up on as needed basis.     Future Appointments   Date Time Provider Department Center   10/7/2025 10:45 AM Ivette Cordero MD [facility]        Provided patient with a 5-10 year written screening schedule and personal prevention plan. Recommendations were developed using the USPSTF age appropriate recommendations. Education, counseling, and referrals were provided as needed. After Visit Summary printed and given to patient which includes a list of additional screenings\tests needed.    Ivette Cordero MD      [Consultation] : a consultation visit [FreeTextEntry1] : Hiatal hernia with chronic ulcerative esophagitis

## 2024-11-05 ENCOUNTER — APPOINTMENT (OUTPATIENT)
Dept: PULMONOLOGY | Facility: CLINIC | Age: 79
End: 2024-11-05
Payer: MEDICARE

## 2024-11-05 VITALS — SYSTOLIC BLOOD PRESSURE: 167 MMHG | HEART RATE: 57 BPM | DIASTOLIC BLOOD PRESSURE: 71 MMHG | OXYGEN SATURATION: 95 %

## 2024-11-05 PROCEDURE — 94010 BREATHING CAPACITY TEST: CPT

## 2024-11-05 PROCEDURE — 99213 OFFICE O/P EST LOW 20 MIN: CPT | Mod: 25

## 2025-02-06 ENCOUNTER — APPOINTMENT (OUTPATIENT)
Dept: MRI IMAGING | Facility: CLINIC | Age: 80
End: 2025-02-06

## 2025-02-06 ENCOUNTER — OUTPATIENT (OUTPATIENT)
Dept: OUTPATIENT SERVICES | Facility: HOSPITAL | Age: 80
LOS: 1 days | End: 2025-02-06
Payer: COMMERCIAL

## 2025-02-06 DIAGNOSIS — Z98.890 OTHER SPECIFIED POSTPROCEDURAL STATES: Chronic | ICD-10-CM

## 2025-02-06 DIAGNOSIS — Z90.89 ACQUIRED ABSENCE OF OTHER ORGANS: Chronic | ICD-10-CM

## 2025-02-06 DIAGNOSIS — R63.4 ABNORMAL WEIGHT LOSS: ICD-10-CM

## 2025-02-06 DIAGNOSIS — Z00.8 ENCOUNTER FOR OTHER GENERAL EXAMINATION: ICD-10-CM

## 2025-02-06 PROCEDURE — A9585: CPT

## 2025-02-06 PROCEDURE — 72197 MRI PELVIS W/O & W/DYE: CPT

## 2025-02-06 PROCEDURE — 72197 MRI PELVIS W/O & W/DYE: CPT | Mod: 26

## 2025-02-06 PROCEDURE — 74183 MRI ABD W/O CNTR FLWD CNTR: CPT

## 2025-02-06 PROCEDURE — 74183 MRI ABD W/O CNTR FLWD CNTR: CPT | Mod: 26

## 2025-03-12 ENCOUNTER — APPOINTMENT (OUTPATIENT)
Dept: ORTHOPEDIC SURGERY | Facility: CLINIC | Age: 80
End: 2025-03-12

## 2025-05-14 ENCOUNTER — APPOINTMENT (OUTPATIENT)
Dept: UROLOGY | Facility: CLINIC | Age: 80
End: 2025-05-14

## 2025-06-12 ENCOUNTER — APPOINTMENT (OUTPATIENT)
Dept: PULMONOLOGY | Facility: CLINIC | Age: 80
End: 2025-06-12
Payer: MEDICARE

## 2025-06-12 VITALS — DIASTOLIC BLOOD PRESSURE: 75 MMHG | SYSTOLIC BLOOD PRESSURE: 143 MMHG

## 2025-06-12 VITALS — WEIGHT: 128 LBS | OXYGEN SATURATION: 96 % | BODY MASS INDEX: 22.67 KG/M2 | HEART RATE: 72 BPM

## 2025-06-12 PROCEDURE — 94729 DIFFUSING CAPACITY: CPT

## 2025-06-12 PROCEDURE — 94727 GAS DIL/WSHOT DETER LNG VOL: CPT

## 2025-06-12 PROCEDURE — 94060 EVALUATION OF WHEEZING: CPT

## 2025-06-12 PROCEDURE — 99214 OFFICE O/P EST MOD 30 MIN: CPT | Mod: 25

## 2025-06-12 PROCEDURE — ZZZZZ: CPT

## 2025-06-13 ENCOUNTER — APPOINTMENT (OUTPATIENT)
Dept: UROLOGY | Facility: CLINIC | Age: 80
End: 2025-06-13
Payer: MEDICARE

## 2025-06-13 VITALS
WEIGHT: 128 LBS | SYSTOLIC BLOOD PRESSURE: 175 MMHG | HEART RATE: 76 BPM | HEIGHT: 63 IN | TEMPERATURE: 98 F | BODY MASS INDEX: 22.68 KG/M2 | DIASTOLIC BLOOD PRESSURE: 76 MMHG | OXYGEN SATURATION: 91 %

## 2025-06-13 PROCEDURE — G2211 COMPLEX E/M VISIT ADD ON: CPT

## 2025-06-13 PROCEDURE — 99214 OFFICE O/P EST MOD 30 MIN: CPT

## 2025-06-13 RX ORDER — TAMSULOSIN HYDROCHLORIDE 0.4 MG/1
0.4 CAPSULE ORAL
Qty: 30 | Refills: 11 | Status: ACTIVE | COMMUNITY
Start: 2025-06-13 | End: 1900-01-01

## 2025-06-14 ENCOUNTER — OUTPATIENT (OUTPATIENT)
Dept: OUTPATIENT SERVICES | Facility: HOSPITAL | Age: 80
LOS: 1 days | End: 2025-06-14
Payer: COMMERCIAL

## 2025-06-14 ENCOUNTER — APPOINTMENT (OUTPATIENT)
Dept: CT IMAGING | Facility: CLINIC | Age: 80
End: 2025-06-14

## 2025-06-14 DIAGNOSIS — Z98.890 OTHER SPECIFIED POSTPROCEDURAL STATES: Chronic | ICD-10-CM

## 2025-06-14 DIAGNOSIS — Z00.8 ENCOUNTER FOR OTHER GENERAL EXAMINATION: ICD-10-CM

## 2025-06-14 DIAGNOSIS — Z90.89 ACQUIRED ABSENCE OF OTHER ORGANS: Chronic | ICD-10-CM

## 2025-06-14 PROCEDURE — 71250 CT THORAX DX C-: CPT | Mod: 26

## 2025-06-14 PROCEDURE — 71250 CT THORAX DX C-: CPT

## 2025-06-16 LAB
APPEARANCE: CLEAR
BACTERIA UR CULT: NORMAL
BACTERIA: NEGATIVE /HPF
BILIRUBIN URINE: NEGATIVE
BLOOD URINE: NEGATIVE
CAST: 1 /LPF
COLOR: YELLOW
EPITHELIAL CELLS: 0 /HPF
GLUCOSE QUALITATIVE U: NEGATIVE MG/DL
KETONES URINE: NEGATIVE MG/DL
LEUKOCYTE ESTERASE URINE: NEGATIVE
MICROSCOPIC-UA: NORMAL
NITRITE URINE: NEGATIVE
PH URINE: 6
PROTEIN URINE: NEGATIVE MG/DL
RED BLOOD CELLS URINE: 0 /HPF
SPECIFIC GRAVITY URINE: 1.01
UROBILINOGEN URINE: 0.2 MG/DL
WHITE BLOOD CELLS URINE: 0 /HPF

## 2025-06-18 ENCOUNTER — NON-APPOINTMENT (OUTPATIENT)
Age: 80
End: 2025-06-18

## 2025-06-20 ENCOUNTER — TRANSCRIPTION ENCOUNTER (OUTPATIENT)
Age: 80
End: 2025-06-20

## 2025-07-10 ENCOUNTER — APPOINTMENT (OUTPATIENT)
Dept: ULTRASOUND IMAGING | Facility: CLINIC | Age: 80
End: 2025-07-10
Payer: MEDICARE

## 2025-07-10 ENCOUNTER — OUTPATIENT (OUTPATIENT)
Dept: OUTPATIENT SERVICES | Facility: HOSPITAL | Age: 80
LOS: 1 days | End: 2025-07-10
Payer: COMMERCIAL

## 2025-07-10 DIAGNOSIS — Z90.89 ACQUIRED ABSENCE OF OTHER ORGANS: Chronic | ICD-10-CM

## 2025-07-10 DIAGNOSIS — Z98.890 OTHER SPECIFIED POSTPROCEDURAL STATES: Chronic | ICD-10-CM

## 2025-07-10 DIAGNOSIS — N40.0 BENIGN PROSTATIC HYPERPLASIA WITHOUT LOWER URINARY TRACT SYMPTOMS: ICD-10-CM

## 2025-07-10 PROCEDURE — 76770 US EXAM ABDO BACK WALL COMP: CPT

## 2025-07-10 PROCEDURE — 76770 US EXAM ABDO BACK WALL COMP: CPT | Mod: 26

## 2025-08-05 ENCOUNTER — APPOINTMENT (OUTPATIENT)
Dept: DERMATOLOGY | Facility: CLINIC | Age: 80
End: 2025-08-05

## 2025-08-05 DIAGNOSIS — L85.3 XEROSIS CUTIS: ICD-10-CM

## 2025-08-05 DIAGNOSIS — Z12.83 ENCOUNTER FOR SCREENING FOR MALIGNANT NEOPLASM OF SKIN: ICD-10-CM

## 2025-08-05 DIAGNOSIS — L82.1 OTHER SEBORRHEIC KERATOSIS: ICD-10-CM

## 2025-08-05 DIAGNOSIS — D18.01 HEMANGIOMA OF SKIN AND SUBCUTANEOUS TISSUE: ICD-10-CM

## 2025-08-05 PROCEDURE — 99213 OFFICE O/P EST LOW 20 MIN: CPT

## (undated) DEVICE — VENODYNE/SCD SLEEVE CALF MEDIUM

## (undated) DEVICE — DRSG CURITY GAUZE SPONGE 4 X 4" 12-PLY NON-STERILE

## (undated) DEVICE — SUT SILK 2-0 30" SH

## (undated) DEVICE — SUT ETHIBOND 0 30" CT-1 GREEN

## (undated) DEVICE — LIJ-ESU BOVIE MACHINE - ROBOTIC 11483366: Type: DURABLE MEDICAL EQUIPMENT

## (undated) DEVICE — XI ARM FORCEP PROGRASP 8MM

## (undated) DEVICE — CONTAINER FORMALIN 80ML YELLOW

## (undated) DEVICE — DRAIN JACKSON PRATT 10MM FLAT FULL NO TROCAR

## (undated) DEVICE — XI ARM CLIP APPLIER MEDIUM-LARGE

## (undated) DEVICE — XI ARM DISSECTOR CURVED BIPOLAR 8MM

## (undated) DEVICE — PACK IV START WITH CHG

## (undated) DEVICE — UNDERPAD LINEN SAVER 17 X 24"

## (undated) DEVICE — POSITIONER STRAP ARMBOARD VELCRO TS-30

## (undated) DEVICE — CLAMP BX HOT RAD JAW 3

## (undated) DEVICE — CHEST DRAIN PLEUR-EVAC DRY/WET ADULT-PEDS SINGLE (QUICK)

## (undated) DEVICE — ELCTR ECG CONDUCTIVE ADHESIVE

## (undated) DEVICE — SUT SILK 0 24" SH DA

## (undated) DEVICE — CATH IV SAFE BC 22G X 1" (BLUE)

## (undated) DEVICE — XI SEAL UNIV 5- 8 MM

## (undated) DEVICE — PACK ROBOTIC LIJ

## (undated) DEVICE — BIOPSY FORCEP COLD DISP

## (undated) DEVICE — XI DRAPE COLUMN

## (undated) DEVICE — DENTURE CUP PINK

## (undated) DEVICE — TUBING STRYKEFLOW II SUCTION / IRRIGATOR

## (undated) DEVICE — BIOPSY FORCEP RADIAL JAW 4 STANDARD WITH NEEDLE

## (undated) DEVICE — DRSG GAUZE PACKTNER ROLL

## (undated) DEVICE — XI ARM FORCEP MARYLAND BIPOLAR

## (undated) DEVICE — XI ARM GRASPER TIP UP FENESTRATED

## (undated) DEVICE — XI ARM FORCEP CADIERE 8MM

## (undated) DEVICE — GRASPER LAPA 5MMX35CM

## (undated) DEVICE — DRAIN RESERVOIR FOR JACKSON PRATT 100CC CARDINAL

## (undated) DEVICE — ELCTR BOVIE TIP BLADE INSULATED 2.75" EDGE

## (undated) DEVICE — XI VESSEL SEALER

## (undated) DEVICE — CONTAINER FORMALIN 10% 60ML

## (undated) DEVICE — TUBING SUCTION 20FT

## (undated) DEVICE — XI ARM PERMANENT CAUTERY SPATULA

## (undated) DEVICE — XI ARM NEEDLE DRIVER LARGE

## (undated) DEVICE — XI ARM FORCEP FENESTRATED BIPOLAR 8MM

## (undated) DEVICE — DRAIN PENROSE .25" X 18" LATEX

## (undated) DEVICE — TROCAR SURGIQUEST AIRSEAL 8MMX120MM

## (undated) DEVICE — LUBRICATING JELLY HR ONE SHOT 3G

## (undated) DEVICE — SUT VLOC 180 0 12" GS-21 GREEN

## (undated) DEVICE — MARKING PEN W RULER

## (undated) DEVICE — D HELP - CLEARVIEW CLEARIFY SYSTEM

## (undated) DEVICE — NDL HYPO REGULAR BEVEL 22G X 1.5" (TURQUOISE)

## (undated) DEVICE — XI DRAPE ARM

## (undated) DEVICE — LUBRICATING JELLY ONESHOT 1.25OZ

## (undated) DEVICE — DRSG BANDAID 0.75X3"

## (undated) DEVICE — BLADE SCALPEL SAFETYLOCK #10

## (undated) DEVICE — BITE BLOCK ADULT 20 X 27MM (GREEN)

## (undated) DEVICE — DRSG 2X2

## (undated) DEVICE — TUBING AIRSEAL TRI-LUMEN FILTERED

## (undated) DEVICE — XI ARM NEEDLE DRIVER SUTURECUT MEGA 8MM

## (undated) DEVICE — XI OBTURATOR OPTICAL BLADELESS 8MM

## (undated) DEVICE — XI ARM SCISSOR MONO CURVED

## (undated) DEVICE — XI ARM PERMANENT CAUTERY HOOK

## (undated) DEVICE — WARMING BLANKET LOWER ADULT

## (undated) DEVICE — LINE BREATHE SAMPLNG

## (undated) DEVICE — TUBING IV SET GRAVITY 3Y 100" MACRO

## (undated) DEVICE — SALIVA EJECTOR (BLUE)

## (undated) DEVICE — BASIN EMESIS 10IN GRADUATED MAUVE

## (undated) DEVICE — SUT VICRYL 0 27" UR-6

## (undated) DEVICE — XI ARM FORCEP TENACULUM

## (undated) DEVICE — SUT MONOCRYL 4-0 27" PS-2 UNDYED

## (undated) DEVICE — TUBING MEDI-VAC W MAXIGRIP CONNECTORS 1/4"X6'

## (undated) DEVICE — XI ARM CLIP APPLIER LARGE

## (undated) DEVICE — GOWN LG

## (undated) DEVICE — FOLEY HOLDER STATLOCK 2 WAY ADULT